# Patient Record
Sex: FEMALE | Race: WHITE | NOT HISPANIC OR LATINO | Employment: PART TIME | URBAN - METROPOLITAN AREA
[De-identification: names, ages, dates, MRNs, and addresses within clinical notes are randomized per-mention and may not be internally consistent; named-entity substitution may affect disease eponyms.]

---

## 2017-03-08 DIAGNOSIS — F51.01 PRIMARY INSOMNIA: ICD-10-CM

## 2017-03-08 NOTE — TELEPHONE ENCOUNTER
amitriptyline (ELAVIL) 10 MG tablet - Fax from pharmacy states: PATIENT NEEDS 90 DAY FOR INSURANCE  Last Written Prescription Date: 11/11/16  Last Quantity: 100, # refills: 3  Last Office Visit with G, P or [facility] prescribing provider: 11/11/16       Creatinine   Date Value Ref Range Status   01/08/2015 0.7 mg/dL Final     Lab Results   Component Value Date    AST 20 01/08/2015     Lab Results   Component Value Date    ALT 17 01/08/2015     BP Readings from Last 3 Encounters:   11/11/16 106/74   01/14/16 118/67   08/11/15 102/70

## 2017-03-09 RX ORDER — AMITRIPTYLINE HYDROCHLORIDE 10 MG/1
10-50 TABLET ORAL
Qty: 270 TABLET | Refills: 3 | Status: SHIPPED | OUTPATIENT
Start: 2017-03-09 | End: 2017-08-18

## 2017-03-09 NOTE — TELEPHONE ENCOUNTER
Routing refill request to provider for review/approval because:  Please clarify a quantity for 90 days. (1-5 per day)  Bailey Martniez, RN  Triage Nurse

## 2017-04-25 ENCOUNTER — OFFICE VISIT (OUTPATIENT)
Dept: PEDIATRICS | Facility: CLINIC | Age: 47
End: 2017-04-25
Payer: COMMERCIAL

## 2017-04-25 VITALS
TEMPERATURE: 97 F | OXYGEN SATURATION: 99 % | HEART RATE: 85 BPM | HEIGHT: 66 IN | WEIGHT: 148 LBS | SYSTOLIC BLOOD PRESSURE: 102 MMHG | DIASTOLIC BLOOD PRESSURE: 60 MMHG | BODY MASS INDEX: 23.78 KG/M2

## 2017-04-25 DIAGNOSIS — Z00.00 ROUTINE GENERAL MEDICAL EXAMINATION AT A HEALTH CARE FACILITY: Primary | ICD-10-CM

## 2017-04-25 DIAGNOSIS — R53.83 FATIGUE, UNSPECIFIED TYPE: ICD-10-CM

## 2017-04-25 DIAGNOSIS — Z13.6 CARDIOVASCULAR SCREENING; LDL GOAL LESS THAN 160: ICD-10-CM

## 2017-04-25 DIAGNOSIS — Z13.1 SCREENING FOR DIABETES MELLITUS: ICD-10-CM

## 2017-04-25 DIAGNOSIS — R42 DIZZINESS: ICD-10-CM

## 2017-04-25 LAB
ERYTHROCYTE [DISTWIDTH] IN BLOOD BY AUTOMATED COUNT: 12 % (ref 10–15)
HCT VFR BLD AUTO: 37.5 % (ref 35–47)
HGB BLD-MCNC: 12.3 G/DL (ref 11.7–15.7)
MCH RBC QN AUTO: 29.9 PG (ref 26.5–33)
MCHC RBC AUTO-ENTMCNC: 32.8 G/DL (ref 31.5–36.5)
MCV RBC AUTO: 91 FL (ref 78–100)
PLATELET # BLD AUTO: 261 10E9/L (ref 150–450)
RBC # BLD AUTO: 4.11 10E12/L (ref 3.8–5.2)
WBC # BLD AUTO: 6.2 10E9/L (ref 4–11)

## 2017-04-25 PROCEDURE — 36415 COLL VENOUS BLD VENIPUNCTURE: CPT | Performed by: INTERNAL MEDICINE

## 2017-04-25 PROCEDURE — 82947 ASSAY GLUCOSE BLOOD QUANT: CPT | Performed by: INTERNAL MEDICINE

## 2017-04-25 PROCEDURE — 99396 PREV VISIT EST AGE 40-64: CPT | Performed by: INTERNAL MEDICINE

## 2017-04-25 PROCEDURE — 80061 LIPID PANEL: CPT | Performed by: INTERNAL MEDICINE

## 2017-04-25 PROCEDURE — 85027 COMPLETE CBC AUTOMATED: CPT | Performed by: INTERNAL MEDICINE

## 2017-04-25 PROCEDURE — 84443 ASSAY THYROID STIM HORMONE: CPT | Performed by: INTERNAL MEDICINE
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
## 2017-04-25 NOTE — NURSING NOTE
"Chief Complaint   Patient presents with     Physical       Initial /60 (BP Location: Right arm, Patient Position: Chair, Cuff Size: Adult Regular)  Pulse 85  Temp 97  F (36.1  C) (Tympanic)  Ht 5' 5.5\" (1.664 m)  Wt 148 lb (67.1 kg)  SpO2 99%  BMI 24.25 kg/m2 Estimated body mass index is 24.25 kg/(m^2) as calculated from the following:    Height as of this encounter: 5' 5.5\" (1.664 m).    Weight as of this encounter: 148 lb (67.1 kg).  Medication Reconciliation: complete  "

## 2017-04-25 NOTE — MR AVS SNAPSHOT
After Visit Summary   4/25/2017    Breonna Pinon    MRN: [id number]           Patient Information     Date Of Birth          1970        Visit Information        Provider Department      4/25/2017 4:50 PM Kaley Carolina MD [facility]        Care Instructions      Preventive Health Recommendations  Female Ages 40 to 49    Yearly exam:     See your health care provider every year in order to  1. Review health changes.   2. Discuss preventive care.    3. Review your medicines if your doctor prescribed any.        If you get Pap tests with HPV test, you only need to test every 5 years, unless you have an abnormal result. You are due in 2019      Ask your doctor if you should have a mammogram.      Have a colonoscopy (test for colon cancer) if someone in your family has had colon cancer or polyps before age 50.       Have a cholesterol test every 5 years.       Have a diabetes test (fasting glucose) after age 45. If you are at risk for diabetes, you should have this test every 3 years.     Shots: Get a flu shot each year. Get a tetanus shot every 10 years.  You are due in 2020.    Nutrition:     Eat at least 5 servings of fruits and vegetables each day.    Eat whole-grain bread, whole-wheat pasta and brown rice instead of white grains and rice.    Talk to your provider about Calcium and Vitamin D.     Lifestyle    Exercise at least 150 minutes a week (an average of 30 minutes a day, 5 days a week). This will help you control your weight and prevent disease.    Limit alcohol to one drink per day.    No smoking.     Wear sunscreen to prevent skin cancer.    See your dentist every six months for an exam and cleaning.        Follow-ups after your visit        Who to contact     If you have questions or need follow up information about today's clinic visit or your schedule please contact [facility] directly at [phone].  Normal or non-critical lab and imaging results will be  "communicated to you by [employer], letter or phone within 4 business days after the clinic has received the results. If you do not hear from us within 7 days, please contact the clinic through [employer] or phone. If you have a critical or abnormal lab result, we will notify you by phone as soon as possible.  Submit refill requests through [employer] or call your pharmacy and they will forward the refill request to us. Please allow 3 business days for your refill to be completed.          Additional Information About Your Visit        [employer] Information     [employer] gives you secure access to your electronic health record. If you see a primary care provider, you can also send messages to your care team and make appointments. If you have questions, please call your primary care clinic.  If you do not have a primary care provider, please call [phone] and they will assist you.        Care EveryWhere ID     This is your Care EveryWhere ID. This could be used by other organizations to access your [facility] medical records  [id number]        Your Vitals Were     Pulse Temperature Height Pulse Oximetry BMI (Body Mass Index)       85 97  F (36.1  C) (Tympanic) 5' 5.5\" (1.664 m) 99% 24.25 kg/m2        Blood Pressure from Last 3 Encounters:   04/25/17 102/60   11/11/16 106/74   01/14/16 118/67    Weight from Last 3 Encounters:   04/25/17 148 lb (67.1 kg)   11/11/16 146 lb 4.8 oz (66.4 kg)   01/14/16 144 lb (65.3 kg)              Today, you had the following     No orders found for display       Primary Care Provider Office Phone # Fax #    Kaley Carolina [phone]       [facility] [address] [place] [address]        Thank you!     Thank you for choosing [facility]  for your care. Our goal is always to provide you with excellent care. Hearing back from our patients is one way we can continue to improve our services. Please take a few minutes to complete the written survey " that you may receive in the mail after your visit with us. Thank you!             Your Updated Medication List - Protect others around you: Learn how to safely use, store and throw away your medicines at www.disposemymeds.org.          This list is accurate as of: 4/25/17  6:02 PM.  Always use your most recent med list.                   Brand Name Dispense Instructions for use    amitriptyline 10 MG tablet    ELAVIL    270 tablet    Take 1-5 tablets (10-50 mg) by mouth nightly as needed for sleep       cetirizine 10 MG tablet    zyrTEC    30 tablet    Take 1 tablet (10 mg) by mouth every evening       fish oil-omega-3 fatty acids 1000 MG capsule     180 capsule    Take 2 capsules by mouth daily.       lactobacillus acid-pectin Caps     100 capsule    Take 1 capsule by mouth daily.       norgestimate-ethinyl estradiol 0.25-35 MG-MCG per tablet    ORTHO-CYCLEN, SPRINTEC    112 tablet    Take 1 tablet by mouth daily Take active pills daily

## 2017-04-25 NOTE — PATIENT INSTRUCTIONS
Preventive Health Recommendations  Female Ages 40 to 49    Yearly exam:     See your health care provider every year in order to  1. Review health changes.   2. Discuss preventive care.    3. Review your medicines if your doctor prescribed any.        If you get Pap tests with HPV test, you only need to test every 5 years, unless you have an abnormal result. You are due in 2019      Ask your doctor if you should have a mammogram.      Have a colonoscopy (test for colon cancer) if someone in your family has had colon cancer or polyps before age 50.       Have a cholesterol test every 5 years.       Have a diabetes test (fasting glucose) after age 45. If you are at risk for diabetes, you should have this test every 3 years.     Shots: Get a flu shot each year. Get a tetanus shot every 10 years.  You are due in 2020.    Nutrition:     Eat at least 5 servings of fruits and vegetables each day.    Eat whole-grain bread, whole-wheat pasta and brown rice instead of white grains and rice.    Talk to your provider about Calcium and Vitamin D.     Lifestyle    Exercise at least 150 minutes a week (an average of 30 minutes a day, 5 days a week). This will help you control your weight and prevent disease.    Limit alcohol to one drink per day.    No smoking.     Wear sunscreen to prevent skin cancer.    See your dentist every six months for an exam and cleaning.

## 2017-04-26 LAB
CHOLEST SERPL-MCNC: 142 MG/DL
GLUCOSE SERPL-MCNC: 82 MG/DL (ref 70–99)
HDLC SERPL-MCNC: 60 MG/DL
LDLC SERPL CALC-MCNC: 55 MG/DL
NONHDLC SERPL-MCNC: 82 MG/DL
TRIGL SERPL-MCNC: 137 MG/DL
TSH SERPL DL<=0.005 MIU/L-ACNC: 2.24 MU/L (ref 0.4–4)

## 2017-08-17 ENCOUNTER — MYC MEDICAL ADVICE (OUTPATIENT)
Dept: PEDIATRICS | Facility: CLINIC | Age: 47
End: 2017-08-17

## 2017-08-17 DIAGNOSIS — F51.01 PRIMARY INSOMNIA: ICD-10-CM

## 2017-08-18 RX ORDER — AMITRIPTYLINE HYDROCHLORIDE 10 MG/1
10-50 TABLET ORAL
Qty: 450 TABLET | Refills: 1 | Status: SHIPPED | OUTPATIENT
Start: 2017-08-18 | End: 2018-02-24

## 2017-08-18 NOTE — TELEPHONE ENCOUNTER
Patient reporting insurance requires a 90 day supply of meds. Her Amitriptyline needs an adjustment in her dispense amount. Confirmed her dosing, adjusted dispense amount for 5 tabs nightly and resent rx per standing order.

## 2017-10-13 ENCOUNTER — MYC MEDICAL ADVICE (OUTPATIENT)
Dept: PEDIATRICS | Facility: CLINIC | Age: 47
End: 2017-10-13

## 2017-11-11 DIAGNOSIS — Z30.41 ENCOUNTER FOR SURVEILLANCE OF CONTRACEPTIVE PILLS: ICD-10-CM

## 2017-11-14 RX ORDER — NORGESTIMATE AND ETHINYL ESTRADIOL 0.25-0.035
KIT ORAL
Qty: 112 TABLET | Refills: 1 | Status: SHIPPED | OUTPATIENT
Start: 2017-11-14 | End: 2018-03-23

## 2017-11-14 NOTE — TELEPHONE ENCOUNTER
Prescription approved per [facility] Refill Protocol.    Susanna GUERRA RN, BSN, PHN  Wayne Flex RN

## 2018-01-22 ENCOUNTER — TELEPHONE (OUTPATIENT)
Dept: PEDIATRICS | Facility: CLINIC | Age: 48
End: 2018-01-22

## 2018-01-22 NOTE — TELEPHONE ENCOUNTER
"Patient Communication Preferences indicate  Do not contact  and/or communication by \"Phone\" is not preferred. Call not required per Outreach team.        Outreach ,  Jaylen Bautista     "

## 2018-01-27 ENCOUNTER — OFFICE VISIT (OUTPATIENT)
Dept: URGENT CARE | Facility: URGENT CARE | Age: 48
End: 2018-01-27
Payer: COMMERCIAL

## 2018-01-27 VITALS
SYSTOLIC BLOOD PRESSURE: 102 MMHG | RESPIRATION RATE: 12 BRPM | OXYGEN SATURATION: 98 % | BODY MASS INDEX: 25.4 KG/M2 | TEMPERATURE: 97.9 F | WEIGHT: 155 LBS | HEART RATE: 86 BPM | DIASTOLIC BLOOD PRESSURE: 72 MMHG

## 2018-01-27 DIAGNOSIS — M62.838 NECK MUSCLE SPASM: Primary | ICD-10-CM

## 2018-01-27 PROCEDURE — 99213 OFFICE O/P EST LOW 20 MIN: CPT | Performed by: PHYSICIAN ASSISTANT

## 2018-01-27 RX ORDER — IBUPROFEN 800 MG/1
800 TABLET, FILM COATED ORAL EVERY 8 HOURS PRN
Qty: 30 TABLET | Refills: 1 | Status: SHIPPED | OUTPATIENT
Start: 2018-01-27 | End: 2018-03-23

## 2018-01-27 RX ORDER — CYCLOBENZAPRINE HCL 10 MG
10 TABLET ORAL 3 TIMES DAILY PRN
Qty: 30 TABLET | Refills: 0 | Status: SHIPPED | OUTPATIENT
Start: 2018-01-27 | End: 2018-02-19
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
## 2018-01-30 ENCOUNTER — OFFICE VISIT (OUTPATIENT)
Dept: PEDIATRICS | Facility: CLINIC | Age: 48
End: 2018-01-30
Payer: COMMERCIAL

## 2018-01-30 VITALS
HEART RATE: 83 BPM | DIASTOLIC BLOOD PRESSURE: 62 MMHG | OXYGEN SATURATION: 97 % | BODY MASS INDEX: 24.46 KG/M2 | SYSTOLIC BLOOD PRESSURE: 100 MMHG | TEMPERATURE: 98.3 F | HEIGHT: 66 IN | WEIGHT: 152.2 LBS

## 2018-01-30 DIAGNOSIS — S16.1XXD STRAIN OF NECK MUSCLE, SUBSEQUENT ENCOUNTER: Primary | ICD-10-CM

## 2018-01-30 PROCEDURE — 99213 OFFICE O/P EST LOW 20 MIN: CPT | Performed by: INTERNAL MEDICINE

## 2018-01-30 NOTE — PATIENT INSTRUCTIONS
Understanding Cervical Strain    There are 7 bones (vertebrae) in the neck that are part of the spine. These are called the cervical spine. Cervical strain is a medical term for neck pain. The neck has several layers of muscles. These are connected with tendons to the cervical spine and other bones. Neck pain is often the result of injury to these muscles and tendons.  Causes of cervical strain  Different types of stress on the neck can damage muscles and tendons (soft tissues) and cause cervical strain. Cervical tissues can be damaged by:    The neck being forced past its normal range of motion, such as in a car accident or sports injury    Constant, low-level stress, such as from poor posture or a poorly set-up workspace  Symptoms of cervical strain  These may include:    Neck pain or stiffness    Pain in the shoulders or upper back    Muscle spasms    Headache, often starting at the base of the neck    Irritability, difficulty concentrating, or sleeplessness  Treatment for cervical strain  This problem often gets better on its own. Treatments aim to reduce pain and inflammation and increase the range of motion of the neck. Possible treatments include:    Over-the-counter or prescription pain medicine. These help relieve pain and inflammation.    Stretching exercises to decrease neck stiffness.    Massage to decrease neck stiffness.    Cold or heat pack. These help reduce pain and swelling.  Call 911  Call emergency services right away if you have any of these:    Face drooping or numbness    Numbness or weakness, especially in the arms or on one side    Slurred speech or difficulty speaking    Blurred vision   When to call your healthcare provider  Call your healthcare provider right away if you have any of these:    Fever of 100.4 F (38 C) or higher, or as directed    Pain or stiffness that gets worse    Symptoms that don t get better, or get worse    Numbness, tingling, weakness or shooting pains into the  arms or legs    New symptoms  Date Last Reviewed: 3/10/2016    2751-8184 [employer]. [address] [place] [address]. All rights reserved. This information is not intended as a substitute for professional medical care. Always follow your healthcare professional's instructions.          Head Tilt / Upper Trapezius Stretch (Flexibility)    1. Sit up straight in a chair with your head and neck in a neutral position, ears in line with shoulders. Hold the edge of your chair seat with your right hand. Tuck your chin in slightly.  2. Tilt your head to the left, while looking straight ahead.  3. Put your left hand on the right side of your head. Gently pull your head to the left. Hold for 30 to 60 seconds. Use gentle pressure to increase the stretch. Don t force your head into position.  4. Return your head and neck to the neutral position.  5. Repeat this exercise 2 times, or as instructed.  6. Switch sides and repeat 2 times, or as instructed.  Challenge yourself  Tuck one end of a towel under your left arm. Then bring the other end over your right shoulder. Pull the towel down on your right shoulder with both hands as you side-bend your head to the left. Repeat with the other side.   Date Last Reviewed: 3/10/2016    0539-8977 [employer]. [address] [place] [address]. All rights reserved. This information is not intended as a substitute for professional medical care. Always follow your healthcare professional's instructions.

## 2018-01-30 NOTE — MR AVS SNAPSHOT
After Visit Summary   1/30/2018    Breonna Pinon    MRN: [id number]           Patient Information     Date Of Birth          1970        Visit Information        Provider Department      1/30/2018 9:30 AM Helio Rodarte Mai, MD [facility]        Today's Diagnoses     Strain of neck muscle, subsequent encounter    -  1      Care Instructions      Understanding Cervical Strain    There are 7 bones (vertebrae) in the neck that are part of the spine. These are called the cervical spine. Cervical strain is a medical term for neck pain. The neck has several layers of muscles. These are connected with tendons to the cervical spine and other bones. Neck pain is often the result of injury to these muscles and tendons.  Causes of cervical strain  Different types of stress on the neck can damage muscles and tendons (soft tissues) and cause cervical strain. Cervical tissues can be damaged by:    The neck being forced past its normal range of motion, such as in a car accident or sports injury    Constant, low-level stress, such as from poor posture or a poorly set-up workspace  Symptoms of cervical strain  These may include:    Neck pain or stiffness    Pain in the shoulders or upper back    Muscle spasms    Headache, often starting at the base of the neck    Irritability, difficulty concentrating, or sleeplessness  Treatment for cervical strain  This problem often gets better on its own. Treatments aim to reduce pain and inflammation and increase the range of motion of the neck. Possible treatments include:    Over-the-counter or prescription pain medicine. These help relieve pain and inflammation.    Stretching exercises to decrease neck stiffness.    Massage to decrease neck stiffness.    Cold or heat pack. These help reduce pain and swelling.  Call 911  Call emergency services right away if you have any of these:    Face drooping or numbness    Numbness or weakness, especially in the arms or  on one side    Slurred speech or difficulty speaking    Blurred vision   When to call your healthcare provider  Call your healthcare provider right away if you have any of these:    Fever of 100.4 F (38 C) or higher, or as directed    Pain or stiffness that gets worse    Symptoms that don t get better, or get worse    Numbness, tingling, weakness or shooting pains into the arms or legs    New symptoms  Date Last Reviewed: 3/10/2016    1257-9052 [employer]. [address] [place] [address]. All rights reserved. This information is not intended as a substitute for professional medical care. Always follow your healthcare professional's instructions.          Head Tilt / Upper Trapezius Stretch (Flexibility)    1. Sit up straight in a chair with your head and neck in a neutral position, ears in line with shoulders. Hold the edge of your chair seat with your right hand. Tuck your chin in slightly.  2. Tilt your head to the left, while looking straight ahead.  3. Put your left hand on the right side of your head. Gently pull your head to the left. Hold for 30 to 60 seconds. Use gentle pressure to increase the stretch. Don t force your head into position.  4. Return your head and neck to the neutral position.  5. Repeat this exercise 2 times, or as instructed.  6. Switch sides and repeat 2 times, or as instructed.  Challenge yourself  Tuck one end of a towel under your left arm. Then bring the other end over your right shoulder. Pull the towel down on your right shoulder with both hands as you side-bend your head to the left. Repeat with the other side.   Date Last Reviewed: 3/10/2016    5700-3730 [employer]. [address] [place] [address]. All rights reserved. This information is not intended as a substitute for professional medical care. Always follow your healthcare professional's instructions.                Follow-ups after your visit        Additional Services      MASSAGE THERAPY REFERRAL       Please be aware that coverage of these services is subject to the terms and limitations of your health insurance plan.  Call member services at your health plan with any benefit or coverage questions.      Please bring the following to your appointment:    >>   Any x-rays, CTs or MRIs which have been performed.  Contact the facility where they were done to arrange for  prior to your scheduled appointment.    >>   List of current medications   >>   This referral request   >>   Any documents/labs given to you for this referral                  Your next 10 appointments already scheduled     Mar 06, 2018  3:30 PM CST   [facility]ba [facility]    [address] [place] [address]  [address]  [facility] [address] [phone]              Who to contact     If you have questions or need follow up information about today's clinic visit or your schedule please contact [facility] directly at [phone].  Normal or non-critical lab and imaging results will be communicated to you by MyChart, letter or phone within 4 business days after the clinic has received the results. If you do not hear from us within 7 days, please contact the clinic through [employer] or phone. If you have a critical or abnormal lab result, we will notify you by phone as soon as possible.  Submit refill requests through [employer] or call your pharmacy and they will forward the refill request to us. Please allow 3 business days for your refill to be completed.          Additional Information About Your Visit        [employer] Information     [employer] gives you secure access to your electronic health record. If you see a primary care provider, you can also send messages to your care team and make appointments. If you have questions, please call your primary care clinic.  If you do not have a primary care provider, please call [phone] and they  "will assist you.        Care EveryWhere ID     This is your Care EveryWhere ID. This could be used by other organizations to access your [facility] medical records  [id number]        Your Vitals Were     Pulse Temperature Height Pulse Oximetry BMI (Body Mass Index)       83 98.3  F (36.8  C) (Oral) 5' 5.5\" (1.664 m) 97% 24.94 kg/m2        Blood Pressure from Last 3 Encounters:   01/30/18 100/62   01/27/18 102/72   04/25/17 102/60    Weight from Last 3 Encounters:   01/30/18 152 lb 3.2 oz (69 kg)   01/27/18 155 lb (70.3 kg)   04/25/17 148 lb (67.1 kg)              We Performed the Following     MASSAGE THERAPY REFERRAL        Primary Care Provider Office Phone # Fax #    Kaley Carolina [phone] [address] [place] [address]        Equal Access to Services     [facility] AH: Hadii chinedu billings hadasho Soomaali, waaxda luqadaha, qaybta kaalmada adeegyada, christie ortega . So [facility] [phone].    ATENCIÓN: Si manasa clark, tiene a frazier disposición servicios gratuitos de asistencia lingüística. Llame al [phone].    We comply with applicable federal civil rights laws and [place] laws. We do not discriminate on the basis of race, color, national origin, age, disability, sex, sexual orientation, or gender identity.            Thank you!     [facility]  [facility] your care. Our goal is always to provide you with excellent care. Hearing back from our patients is one way we can continue to improve our services. Please take a few minutes to complete the written survey that you may receive in the mail after your visit with us. Thank you!             Your Updated Medication List - Protect others around you: Learn how to safely use, store and throw away your medicines at www.disposemymeds.org.          This list is accurate as of 1/30/18 10:37 AM.  Always use your most recent med list.                   Brand Name Dispense Instructions " for use Diagnosis    amitriptyline 10 MG tablet    ELAVIL    450 tablet    Take 1-5 tablets (10-50 mg) by mouth nightly as needed for sleep    Primary insomnia       cetirizine 10 MG tablet    zyrTEC    30 tablet    Take 1 tablet (10 mg) by mouth every evening        cyclobenzaprine 10 MG tablet    FLEXERIL    30 tablet    Take 1 tablet (10 mg) by mouth 3 times daily as needed for muscle spasms    Neck muscle spasm       ibuprofen 800 MG tablet    ADVIL/MOTRIN    30 tablet    Take 1 tablet (800 mg) by mouth every 8 hours as needed for moderate pain    Neck muscle spasm       lactobacillus acid-pectin Caps     100 capsule    Take 1 capsule by mouth daily.        SPRINTEC 28 0.25-35 MG-MCG per tablet   Generic drug:  norgestimate-ethinyl estradiol     112 tablet    TAKE 1 TABLET BY MOUTH DAILY. TAKE ACTIVE TABLETS DAILY    Encounter for surveillance of contraceptive pills

## 2018-01-30 NOTE — NURSING NOTE
"No chief complaint on file.      Initial /62 (BP Location: Right arm, Patient Position: Chair, Cuff Size: Adult Regular)  Pulse 83  Temp 98.3  F (36.8  C) (Oral)  Ht 5' 5.5\" (1.664 m)  Wt 152 lb 3.2 oz (69 kg)  SpO2 97%  BMI 24.94 kg/m2 Estimated body mass index is 24.94 kg/(m^2) as calculated from the following:    Height as of this encounter: 5' 5.5\" (1.664 m).    Weight as of this encounter: 152 lb 3.2 oz (69 kg).  Medication Reconciliation: complete   Risa Mayfield MA 9:50 AM 1/30/2018     "
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
## 2018-01-31 ENCOUNTER — MYC MEDICAL ADVICE (OUTPATIENT)
Dept: PEDIATRICS | Facility: CLINIC | Age: 48
End: 2018-01-31
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
## 2018-02-07 ENCOUNTER — THERAPY VISIT (OUTPATIENT)
Dept: PHYSICAL THERAPY | Facility: CLINIC | Age: 48
End: 2018-02-07
Payer: COMMERCIAL

## 2018-02-07 DIAGNOSIS — M54.2 CERVICAL PAIN: Primary | ICD-10-CM

## 2018-02-07 DIAGNOSIS — M25.512 ACUTE PAIN OF LEFT SHOULDER: ICD-10-CM

## 2018-02-07 DIAGNOSIS — M79.672 LEFT FOOT PAIN: ICD-10-CM

## 2018-02-07 PROCEDURE — 97161 PT EVAL LOW COMPLEX 20 MIN: CPT | Mod: GP | Performed by: PHYSICAL THERAPIST

## 2018-02-07 PROCEDURE — 97110 THERAPEUTIC EXERCISES: CPT | Mod: GP | Performed by: PHYSICAL THERAPIST
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
## 2018-02-14 ENCOUNTER — THERAPY VISIT (OUTPATIENT)
Dept: PHYSICAL THERAPY | Facility: CLINIC | Age: 48
End: 2018-02-14
Payer: COMMERCIAL

## 2018-02-14 DIAGNOSIS — M54.2 CERVICAL PAIN: ICD-10-CM

## 2018-02-14 DIAGNOSIS — M79.672 LEFT FOOT PAIN: ICD-10-CM

## 2018-02-14 DIAGNOSIS — M25.512 ACUTE PAIN OF LEFT SHOULDER: ICD-10-CM

## 2018-02-14 PROCEDURE — 97110 THERAPEUTIC EXERCISES: CPT | Mod: GP | Performed by: PHYSICAL THERAPIST

## 2018-02-14 PROCEDURE — 97112 NEUROMUSCULAR REEDUCATION: CPT | Mod: GP | Performed by: PHYSICAL THERAPIST

## 2018-02-21 ENCOUNTER — THERAPY VISIT (OUTPATIENT)
Dept: PHYSICAL THERAPY | Facility: CLINIC | Age: 48
End: 2018-02-21
Payer: COMMERCIAL

## 2018-02-21 DIAGNOSIS — M79.672 LEFT FOOT PAIN: ICD-10-CM

## 2018-02-21 DIAGNOSIS — M54.2 CERVICAL PAIN: ICD-10-CM

## 2018-02-21 DIAGNOSIS — M25.512 ACUTE PAIN OF LEFT SHOULDER: ICD-10-CM

## 2018-02-21 PROCEDURE — 97110 THERAPEUTIC EXERCISES: CPT | Mod: GP | Performed by: PHYSICAL THERAPIST

## 2018-02-21 PROCEDURE — 97112 NEUROMUSCULAR REEDUCATION: CPT | Mod: GP | Performed by: PHYSICAL THERAPIST

## 2018-02-24 DIAGNOSIS — F51.01 PRIMARY INSOMNIA: ICD-10-CM

## 2018-02-24 NOTE — TELEPHONE ENCOUNTER
"Requested Prescriptions   Pending Prescriptions Disp Refills     amitriptyline (ELAVIL) 10 MG tablet [Pharmacy Med Name: AMITRIPTYLINE HCL 10 MG TAB]  Last Written Prescription Date:  08/18/2017  Last Fill Quantity: 450 tablet,  # refills: 1   Last office visit: 1/30/2018 with prescribing provider:  Helio Rodarte Mai, MD    Future Office Visit:   Next 5 appointments (look out 90 days)     Mar 23, 2018  8:30 AM CDT   [facility]    [address] [place] [address]  [address]  [facility] [id number] [phone]                  450 tablet 1     Sig: TAKE 1-5 TABLETS (10-50 MG) BY MOUTH NIGHTLY AS NEEDED FOR SLEEP    Tricyclic Antidepressants Protocol Failed    2/24/2018 10:38 AM       Failed - No positive pregnancy test in past 12 months       Passed - Blood pressure under 140/90 in past 12 months    BP Readings from Last 3 Encounters:   01/30/18 100/62   01/27/18 102/72   04/25/17 102/60                Passed - Recent (12 mo) or future (30 d) visit with authorizing provider's specialty     Patient had office visit in the last year or has a visit in the next 30 days with authorizing provider.  See \"Patient Info\" tab in inbasket, or \"Choose Columns\" in Meds & Orders section of the refill encounter.            Passed - Patient is age 18 or older       Passed - No active pregnancy on record          "

## 2018-02-26 ENCOUNTER — THERAPY VISIT (OUTPATIENT)
Dept: CHIROPRACTIC MEDICINE | Facility: CLINIC | Age: 48
End: 2018-02-26
Payer: COMMERCIAL

## 2018-02-26 DIAGNOSIS — S16.1XXD STRAIN OF NECK MUSCLE, SUBSEQUENT ENCOUNTER: Primary | ICD-10-CM

## 2018-02-26 DIAGNOSIS — M54.6 ACUTE BILATERAL THORACIC BACK PAIN: ICD-10-CM

## 2018-02-26 DIAGNOSIS — M79.672 LEFT FOOT PAIN: ICD-10-CM

## 2018-02-26 DIAGNOSIS — M99.02 THORACIC SEGMENT DYSFUNCTION: ICD-10-CM
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
## 2018-02-26 DIAGNOSIS — M99.06 SOMATIC DYSFUNCTION OF LOWER EXTREMITIES: ICD-10-CM

## 2018-02-26 DIAGNOSIS — M25.512 ACUTE PAIN OF LEFT SHOULDER: ICD-10-CM

## 2018-02-26 PROCEDURE — 98940 CHIROPRACT MANJ 1-2 REGIONS: CPT | Mod: AT | Performed by: CHIROPRACTOR

## 2018-02-26 PROCEDURE — 97810 ACUP 1/> WO ESTIM 1ST 15 MIN: CPT | Mod: GA | Performed by: CHIROPRACTOR

## 2018-02-26 PROCEDURE — 99203 OFFICE O/P NEW LOW 30 MIN: CPT | Mod: 25 | Performed by: CHIROPRACTOR

## 2018-02-27 RX ORDER — AMITRIPTYLINE HYDROCHLORIDE 10 MG/1
TABLET ORAL
Qty: 450 TABLET | Refills: 1 | Status: SHIPPED | OUTPATIENT
Start: 2018-02-27 | End: 2018-03-23 | Stop reason: SINTOL
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
## 2018-02-28 ENCOUNTER — THERAPY VISIT (OUTPATIENT)
Dept: PHYSICAL THERAPY | Facility: CLINIC | Age: 48
End: 2018-02-28
Payer: COMMERCIAL

## 2018-02-28 DIAGNOSIS — M54.2 CERVICAL PAIN: ICD-10-CM

## 2018-02-28 DIAGNOSIS — M25.512 ACUTE PAIN OF LEFT SHOULDER: ICD-10-CM

## 2018-02-28 DIAGNOSIS — M79.672 LEFT FOOT PAIN: ICD-10-CM

## 2018-02-28 PROCEDURE — 97110 THERAPEUTIC EXERCISES: CPT | Mod: GP | Performed by: PHYSICAL THERAPIST
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
## 2018-03-01 PROBLEM — M99.06 SOMATIC DYSFUNCTION OF LOWER EXTREMITIES: Status: ACTIVE | Noted: 2018-03-01

## 2018-03-01 PROBLEM — S16.1XXD STRAIN OF NECK MUSCLE, SUBSEQUENT ENCOUNTER: Status: ACTIVE | Noted: 2018-03-01

## 2018-03-01 PROBLEM — M99.02 THORACIC SEGMENT DYSFUNCTION: Status: ACTIVE | Noted: 2018-03-01

## 2018-03-01 PROBLEM — M54.6 ACUTE BILATERAL THORACIC BACK PAIN: Status: ACTIVE | Noted: 2018-03-01

## 2018-03-05 ENCOUNTER — TELEPHONE (OUTPATIENT)
Dept: PEDIATRICS | Facility: CLINIC | Age: 48
End: 2018-03-05
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
## 2018-03-06 ENCOUNTER — THERAPY VISIT (OUTPATIENT)
Dept: CHIROPRACTIC MEDICINE | Facility: CLINIC | Age: 48
End: 2018-03-06
Payer: COMMERCIAL

## 2018-03-06 ENCOUNTER — OFFICE VISIT (OUTPATIENT)
Dept: DERMATOLOGY | Facility: CLINIC | Age: 48
End: 2018-03-06
Payer: COMMERCIAL

## 2018-03-06 DIAGNOSIS — M25.512 ACUTE PAIN OF LEFT SHOULDER: ICD-10-CM

## 2018-03-06 DIAGNOSIS — L90.0 LICHEN SCLEROSUS ET ATROPHICUS: Primary | ICD-10-CM

## 2018-03-06 DIAGNOSIS — M79.672 LEFT FOOT PAIN: ICD-10-CM

## 2018-03-06 DIAGNOSIS — L71.0 PERIORAL DERMATITIS: ICD-10-CM

## 2018-03-06 DIAGNOSIS — M99.06 SOMATIC DYSFUNCTION OF LOWER EXTREMITIES: ICD-10-CM
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
## 2018-03-06 DIAGNOSIS — Z51.81 MEDICATION MONITORING ENCOUNTER: ICD-10-CM

## 2018-03-06 DIAGNOSIS — S16.1XXD STRAIN OF NECK MUSCLE, SUBSEQUENT ENCOUNTER: Primary | ICD-10-CM

## 2018-03-06 DIAGNOSIS — M54.6 ACUTE BILATERAL THORACIC BACK PAIN: ICD-10-CM

## 2018-03-06 DIAGNOSIS — B35.3 TINEA PEDIS OF BOTH FEET: ICD-10-CM

## 2018-03-06 DIAGNOSIS — M99.02 THORACIC SEGMENT DYSFUNCTION: ICD-10-CM

## 2018-03-06 PROCEDURE — 98940 CHIROPRACT MANJ 1-2 REGIONS: CPT | Mod: AT | Performed by: CHIROPRACTOR

## 2018-03-06 PROCEDURE — 98943 CHIROPRACT MANJ XTRSPINL 1/>: CPT | Mod: 51 | Performed by: CHIROPRACTOR

## 2018-03-06 PROCEDURE — 97810 ACUP 1/> WO ESTIM 1ST 15 MIN: CPT | Mod: GA | Performed by: CHIROPRACTOR

## 2018-03-06 PROCEDURE — 99203 OFFICE O/P NEW LOW 30 MIN: CPT | Performed by: DERMATOLOGY

## 2018-03-06 RX ORDER — ECONAZOLE NITRATE 10 MG/G
CREAM TOPICAL
Qty: 30 G | Refills: 3 | Status: SHIPPED | OUTPATIENT
Start: 2018-03-06 | End: 2019-04-05

## 2018-03-06 RX ORDER — DOXYCYCLINE HYCLATE 100 MG
100 TABLET ORAL 2 TIMES DAILY
Qty: 28 TABLET | Refills: 1 | Status: SHIPPED | OUTPATIENT
Start: 2018-03-06 | End: 2019-04-05

## 2018-03-06 RX ORDER — CLOBETASOL PROPIONATE 0.5 MG/G
OINTMENT TOPICAL
Qty: 30 G | Refills: 3 | Status: SHIPPED | OUTPATIENT
Start: 2018-03-06 | End: 2020-07-15

## 2018-03-06 NOTE — TELEPHONE ENCOUNTER
Call to Shauna and she was advised.  She confirms that they do require the prescription to list the nu,rodger of x per week.  Madelyn Sepulveda, RACHELLE  Message handled by Nurse Triage.
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
## 2018-03-06 NOTE — TELEPHONE ENCOUNTER
I prefer the frequency and number of sessions to be determined by the referral specialist (massage therapist) after first consultation.  However, if that is not acceptable by this insurance company, then I recommend weekly for 4-6 weeks.  Please relay message.  Thank you.    Dia Rodarte MD
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
## 2018-03-13 ENCOUNTER — THERAPY VISIT (OUTPATIENT)
Dept: CHIROPRACTIC MEDICINE | Facility: CLINIC | Age: 48
End: 2018-03-13
Payer: COMMERCIAL

## 2018-03-13 DIAGNOSIS — M25.512 ACUTE PAIN OF LEFT SHOULDER: ICD-10-CM

## 2018-03-13 DIAGNOSIS — M99.06 SOMATIC DYSFUNCTION OF LOWER EXTREMITIES: ICD-10-CM

## 2018-03-13 DIAGNOSIS — M79.672 LEFT FOOT PAIN: Primary | ICD-10-CM
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
## 2018-03-13 DIAGNOSIS — M99.02 THORACIC SEGMENT DYSFUNCTION: ICD-10-CM

## 2018-03-13 DIAGNOSIS — M54.6 ACUTE BILATERAL THORACIC BACK PAIN: ICD-10-CM

## 2018-03-13 DIAGNOSIS — S16.1XXD STRAIN OF NECK MUSCLE, SUBSEQUENT ENCOUNTER: ICD-10-CM

## 2018-03-13 PROCEDURE — 97810 ACUP 1/> WO ESTIM 1ST 15 MIN: CPT | Mod: GA | Performed by: CHIROPRACTOR

## 2018-03-13 PROCEDURE — 98943 CHIROPRACT MANJ XTRSPINL 1/>: CPT | Mod: 51 | Performed by: CHIROPRACTOR

## 2018-03-13 PROCEDURE — 98940 CHIROPRACT MANJ 1-2 REGIONS: CPT | Mod: AT | Performed by: CHIROPRACTOR
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
## 2018-03-20 ENCOUNTER — THERAPY VISIT (OUTPATIENT)
Dept: CHIROPRACTIC MEDICINE | Facility: CLINIC | Age: 48
End: 2018-03-20
Payer: COMMERCIAL

## 2018-03-20 DIAGNOSIS — M79.672 LEFT FOOT PAIN: Primary | ICD-10-CM
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
## 2018-03-20 DIAGNOSIS — M99.06 SOMATIC DYSFUNCTION OF LOWER EXTREMITIES: ICD-10-CM

## 2018-03-20 PROCEDURE — 97810 ACUP 1/> WO ESTIM 1ST 15 MIN: CPT | Mod: GA | Performed by: CHIROPRACTOR

## 2018-03-20 PROCEDURE — 98943 CHIROPRACT MANJ XTRSPINL 1/>: CPT | Mod: 51 | Performed by: CHIROPRACTOR
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
## 2018-03-23 ENCOUNTER — TELEPHONE (OUTPATIENT)
Dept: PEDIATRICS | Facility: CLINIC | Age: 48
End: 2018-03-23

## 2018-03-23 ENCOUNTER — OFFICE VISIT (OUTPATIENT)
Dept: PEDIATRICS | Facility: CLINIC | Age: 48
End: 2018-03-23
Payer: COMMERCIAL

## 2018-03-23 VITALS
WEIGHT: 151.6 LBS | HEIGHT: 66 IN | DIASTOLIC BLOOD PRESSURE: 66 MMHG | HEART RATE: 98 BPM | BODY MASS INDEX: 24.36 KG/M2 | SYSTOLIC BLOOD PRESSURE: 100 MMHG | TEMPERATURE: 95.4 F | OXYGEN SATURATION: 99 %

## 2018-03-23 DIAGNOSIS — Z13.1 SCREENING FOR DIABETES MELLITUS: ICD-10-CM

## 2018-03-23 DIAGNOSIS — M79.672 LEFT FOOT PAIN: ICD-10-CM

## 2018-03-23 DIAGNOSIS — M54.6 ACUTE BILATERAL THORACIC BACK PAIN: Primary | ICD-10-CM

## 2018-03-23 DIAGNOSIS — Z51.81 MEDICATION MONITORING ENCOUNTER: ICD-10-CM

## 2018-03-23 DIAGNOSIS — Z13.6 CARDIOVASCULAR SCREENING; LDL GOAL LESS THAN 160: ICD-10-CM

## 2018-03-23 DIAGNOSIS — M54.2 CERVICAL PAIN: ICD-10-CM

## 2018-03-23 DIAGNOSIS — Z30.41 ENCOUNTER FOR SURVEILLANCE OF CONTRACEPTIVE PILLS: ICD-10-CM

## 2018-03-23 DIAGNOSIS — Z00.00 ENCOUNTER FOR ROUTINE ADULT HEALTH EXAMINATION WITHOUT ABNORMAL FINDINGS: Primary | ICD-10-CM

## 2018-03-23 LAB
ALBUMIN SERPL-MCNC: 3.7 G/DL (ref 3.4–5)
ALP SERPL-CCNC: 60 U/L (ref 40–150)
ALT SERPL W P-5'-P-CCNC: 18 U/L (ref 0–50)
AST SERPL W P-5'-P-CCNC: 21 U/L (ref 0–45)
BILIRUB DIRECT SERPL-MCNC: 0.1 MG/DL (ref 0–0.2)
BILIRUB SERPL-MCNC: 0.4 MG/DL (ref 0.2–1.3)
CHOLEST SERPL-MCNC: 143 MG/DL
GLUCOSE SERPL-MCNC: 87 MG/DL (ref 70–99)
HDLC SERPL-MCNC: 66 MG/DL
LDLC SERPL CALC-MCNC: 58 MG/DL
NONHDLC SERPL-MCNC: 77 MG/DL
PROT SERPL-MCNC: 6.9 G/DL (ref 6.8–8.8)
TRIGL SERPL-MCNC: 96 MG/DL

## 2018-03-23 PROCEDURE — 36415 COLL VENOUS BLD VENIPUNCTURE: CPT | Performed by: DERMATOLOGY

## 2018-03-23 PROCEDURE — 82947 ASSAY GLUCOSE BLOOD QUANT: CPT | Performed by: DERMATOLOGY

## 2018-03-23 PROCEDURE — 80076 HEPATIC FUNCTION PANEL: CPT | Performed by: DERMATOLOGY

## 2018-03-23 PROCEDURE — 80061 LIPID PANEL: CPT | Performed by: DERMATOLOGY

## 2018-03-23 PROCEDURE — 99396 PREV VISIT EST AGE 40-64: CPT | Performed by: INTERNAL MEDICINE

## 2018-03-23 RX ORDER — NAPROXEN 500 MG/1
500 TABLET ORAL 2 TIMES DAILY WITH MEALS
Qty: 60 TABLET | Refills: 1 | Status: SHIPPED | OUTPATIENT
Start: 2018-03-23 | End: 2019-04-05

## 2018-03-23 RX ORDER — NORGESTIMATE AND ETHINYL ESTRADIOL 0.25-0.035
KIT ORAL
Qty: 112 TABLET | Refills: 3 | Status: SHIPPED | OUTPATIENT
Start: 2018-03-23 | End: 2019-03-18

## 2018-03-23 RX ORDER — CYCLOBENZAPRINE HCL 10 MG
10 TABLET ORAL 3 TIMES DAILY PRN
Qty: 90 TABLET | Refills: 1 | Status: SHIPPED | OUTPATIENT
Start: 2018-03-23 | End: 2019-04-05

## 2018-03-23 ASSESSMENT — ENCOUNTER SYMPTOMS
JOINT SWELLING: 0
HEMATOCHEZIA: 0
SORE THROAT: 0
CHILLS: 0
MYALGIAS: 1
EYE PAIN: 0
COUGH: 0
DIARRHEA: 0
ABDOMINAL PAIN: 0
FEVER: 0
SHORTNESS OF BREATH: 0
DIZZINESS: 0
PARESTHESIAS: 0
ARTHRALGIAS: 0
NAUSEA: 0
CONSTIPATION: 0
WEAKNESS: 0
FREQUENCY: 1
PALPITATIONS: 0
HEMATURIA: 0
HEADACHES: 0
DYSURIA: 0
HEARTBURN: 0
NERVOUS/ANXIOUS: 0

## 2018-03-23 NOTE — PATIENT INSTRUCTIONS
Preventive Health Recommendations  Female Ages 40 to 49    Yearly exam:     See your health care provider every year in order to  1. Review health changes.   2. Discuss preventive care.    3. Review your medicines if your doctor prescribed any.        If you get Pap tests with HPV test, you only need to test every 5 years, unless you have an abnormal result.         Ask your doctor if you should have a mammogram.      Have a colonoscopy (test for colon cancer) if someone in your family has had colon cancer or polyps before age 50.       Have a cholesterol test every 5 years.       Have a diabetes test (fasting glucose) after age 45. If you are at risk for diabetes, you should have this test every 3 years.    Shots: Get a flu shot each year. Get a tetanus shot every 10 years.  You are due in 2020.    Nutrition:     Eat at least 5 servings of fruits and vegetables each day.    Eat whole-grain bread, whole-wheat pasta and brown rice instead of white grains and rice.    Talk to your provider about Calcium and Vitamin D.     Lifestyle    Exercise at least 150 minutes a week (an average of 30 minutes a day, 5 days a week). This will help you control your weight and prevent disease.    Limit alcohol to one drink per day.    No smoking.     Wear sunscreen to prevent skin cancer.    See your dentist every six months for an exam and cleaning.

## 2018-03-23 NOTE — MR AVS SNAPSHOT
After Visit Summary   3/23/2018    Breonna Pinon    MRN: [id number]           Patient Information     Date Of Birth          1970        Visit Information        Provider Department      3/23/2018 8:30 AM Kaley Carolina MD [facility]        Today's Diagnoses     Encounter for routine adult health examination without abnormal findings    -  1    Medication monitoring encounter        Encounter for surveillance of contraceptive pills        CARDIOVASCULAR SCREENING; LDL GOAL LESS THAN 160        Screening for diabetes mellitus          Care Instructions      Preventive Health Recommendations  Female Ages 40 to 49    Yearly exam:     See your health care provider every year in order to  1. Review health changes.   2. Discuss preventive care.    3. Review your medicines if your doctor prescribed any.        If you get Pap tests with HPV test, you only need to test every 5 years, unless you have an abnormal result.         Ask your doctor if you should have a mammogram.      Have a colonoscopy (test for colon cancer) if someone in your family has had colon cancer or polyps before age 50.       Have a cholesterol test every 5 years.       Have a diabetes test (fasting glucose) after age 45. If you are at risk for diabetes, you should have this test every 3 years.    Shots: Get a flu shot each year. Get a tetanus shot every 10 years.  You are due in 2020.    Nutrition:     Eat at least 5 servings of fruits and vegetables each day.    Eat whole-grain bread, whole-wheat pasta and brown rice instead of white grains and rice.    Talk to your provider about Calcium and Vitamin D.     Lifestyle    Exercise at least 150 minutes a week (an average of 30 minutes a day, 5 days a week). This will help you control your weight and prevent disease.    Limit alcohol to one drink per day.    No smoking.     Wear sunscreen to prevent skin cancer.    See your dentist every six months for an exam and  cleaning.          Follow-ups after your visit        Follow-up notes from your care team     Return in about 1 year (around 3/23/2019) for Physical Exam.      Your next 10 appointments already scheduled     Apr 03, 2018  8:00 AM CDT   Chiro Acupuncture Follow Up with ELOISA Renae BURNSEMILIANO CHIRO ([facility]  )    [address]  [address]  [place] [facility] [address]   [phone]            Apr 10, 2018  8:30 AM CDT   Chiro Acupuncture Follow Up with ELOISA Renae BURNSEMILIANO CHIRO ([facility]  )    [address]  [address]  [place] [address]   [phone]            Apr 17, 2018  8:30 AM CDT   Chiro Acupuncture Follow Up with ELOISA Renae BURNSEMILIANO CHIRO ([facility]  )    [address]  [address]  [place] [id number] [phone]              Who to contact     If you have questions or need follow up information about today's clinic visit or your schedule please contact [facility] [place] directly at [phone].  Normal or non-critical lab and imaging results will be communicated to you by MyChart, letter or phone within 4 business days after the clinic has received the results. If you do not hear from us within 7 days, please contact the clinic through AFreezehart or phone. If you have a critical or abnormal lab result, we will notify you by phone as soon as possible.  Submit refill requests through Ch[employer]ani or call your pharmacy and they will forward the refill request to us. Please allow 3 business days for your refill to be completed.          Additional Information About Your Visit        MyChart Information     [employer] gives you secure access to your electronic health record. If you see a primary care provider, you can also send messages to your care team and make appointments. If you have questions, please call your primary care clinic.  If you do not have a primary care provider, please call [phone]  "and they will assist you.        Care EveryWhere ID     This is your Care EveryWhere ID. This could be used by other organizations to access your [place] medical records  [id number]        Your Vitals Were     Pulse Temperature Height Pulse Oximetry BMI (Body Mass Index)       98 95.4  F (35.2  C) (Tympanic) 5' 5.5\" (1.664 m) 99% 24.84 kg/m2        Blood Pressure from Last 3 Encounters:   03/23/18 100/66   01/30/18 100/62   01/27/18 102/72    Weight from Last 3 Encounters:   03/23/18 151 lb 9.6 oz (68.8 kg)   01/30/18 152 lb 3.2 oz (69 kg)   01/27/18 155 lb (70.3 kg)              We Performed the Following     Glucose     Hepatic panel     Lipid panel reflex to direct LDL Fasting          Today's Medication Changes          These changes are accurate as of 3/23/18  9:31 AM.  If you have any questions, ask your nurse or doctor.               Start taking these medicines.        Dose/Directions    naproxen 500 MG tablet   Commonly known as:  NAPROSYN   Used for:  Cervical pain, Acute bilateral thoracic back pain, Left foot pain   Replaces:  ibuprofen 800 MG tablet   Started by:  Kaley Carolina MD        Dose:  500 mg   Take 1 tablet (500 mg) by mouth 2 times daily (with meals)   Quantity:  60 tablet   Refills:  1         These medicines have changed or have updated prescriptions.        Dose/Directions    norgestimate-ethinyl estradiol 0.25-35 MG-MCG per tablet   Commonly known as:  SPRINTEC 28   This may have changed:  See the new instructions.   Used for:  Encounter for surveillance of contraceptive pills   Changed by:  Kaley Carolina MD        TAKE 1 TABLET BY MOUTH DAILY. TAKE ACTIVE TABLETS DAILY   Quantity:  112 tablet   Refills:  3         Stop taking these medicines if you haven't already. Please contact your care team if you have questions.     amitriptyline 10 MG tablet   Commonly known as:  ELAVIL   Stopped by:  Kaley Carolina MD           ibuprofen 800 MG tablet   Commonly known as:  ADVIL/MOTRIN "   Replaced by:  naproxen 500 MG tablet   Stopped by:  Kaley Carolina MD                Where to get your medicines      These medications were sent to [facility]/pharmacy #[id number] - [place], [place] - [address] AT [address] [place] [address], [place] [address]     Phone:  [phone]     cyclobenzaprine 10 MG tablet    naproxen 500 MG tablet    norgestimate-ethinyl estradiol 0.25-35 MG-MCG per tablet                Primary Care Provider Office Phone # Fax #    Kaley Carolina [phone] [address] [place] [address]        Equal Access to Services     [facility]: Hadii aad ku hadasho Soomaali, waaxda luqadaha, qaybta kaalmada adeegyada, christie ortega . [facility] [phone].    ATENCIÓN: Si habla español, tiene a frazier disposición servicios gratuitos de asistencia lingüística. [facility] [phone].    We comply with applicable federal civil rights laws and [place] laws. We do not discriminate on the basis of race, color, national origin, age, disability, sex, sexual orientation, or gender identity.            Thank you!     Thank you for choosing [facility]  for your care. Our goal is always to provide you with excellent care. Hearing back from our patients is one way we can continue to improve our services. Please take a few minutes to complete the written survey that you may receive in the mail after your visit with us. Thank you!             Your Updated Medication List - Protect others around you: Learn how to safely use, store and throw away your medicines at www.disposemymeds.org.          This list is accurate as of 3/23/18  9:31 AM.  Always use your most recent med list.                   Brand Name Dispense Instructions for use Diagnosis    cetirizine 10 MG tablet    zyrTEC    30 tablet    Take 1 tablet (10 mg) by mouth every evening        clobetasol 0.05 % ointment    TEMOVATE    30 g    Twice daily to rash  area in genital area during flares    Lichen sclerosus et atrophicus       cyclobenzaprine 10 MG tablet    FLEXERIL    90 tablet    Take 1 tablet (10 mg) by mouth 3 times daily as needed for muscle spasms    Cervical pain       doxycycline 100 MG tablet    VIBRA-TABS    28 tablet    Take 1 tablet (100 mg) by mouth 2 times daily    Perioral dermatitis       econazole nitrate 1 % cream     30 g    Twice daily to foot rash until clear    Tinea pedis of both feet       lactobacillus acid-pectin Caps     100 capsule    Take 1 capsule by mouth daily.        naproxen 500 MG tablet    NAPROSYN    60 tablet    Take 1 tablet (500 mg) by mouth 2 times daily (with meals)    Cervical pain, Acute bilateral thoracic back pain, Left foot pain       norgestimate-ethinyl estradiol 0.25-35 MG-MCG per tablet    SPRINTEC 28    112 tablet    TAKE 1 TABLET BY MOUTH DAILY. TAKE ACTIVE TABLETS DAILY    Encounter for surveillance of contraceptive pills
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
## 2018-03-24 DIAGNOSIS — B35.1 ONYCHOMYCOSIS: Primary | ICD-10-CM

## 2018-03-24 RX ORDER — TERBINAFINE HYDROCHLORIDE 250 MG/1
250 TABLET ORAL DAILY
Qty: 45 TABLET | Refills: 0 | Status: SHIPPED | OUTPATIENT
Start: 2018-03-24 | End: 2018-05-07

## 2018-04-03 ENCOUNTER — THERAPY VISIT (OUTPATIENT)
Dept: CHIROPRACTIC MEDICINE | Facility: CLINIC | Age: 48
End: 2018-04-03
Payer: COMMERCIAL

## 2018-04-03 DIAGNOSIS — M79.672 LEFT FOOT PAIN: ICD-10-CM

## 2018-04-03 DIAGNOSIS — Z51.81 MEDICATION MONITORING ENCOUNTER: Primary | ICD-10-CM
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
## 2018-04-03 DIAGNOSIS — M99.06 SOMATIC DYSFUNCTION OF LOWER EXTREMITIES: Primary | ICD-10-CM

## 2018-04-03 PROCEDURE — 98943 CHIROPRACT MANJ XTRSPINL 1/>: CPT | Mod: 51 | Performed by: CHIROPRACTOR

## 2018-04-03 PROCEDURE — 97810 ACUP 1/> WO ESTIM 1ST 15 MIN: CPT | Mod: GA | Performed by: CHIROPRACTOR
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
## 2018-04-10 ENCOUNTER — THERAPY VISIT (OUTPATIENT)
Dept: CHIROPRACTIC MEDICINE | Facility: CLINIC | Age: 48
End: 2018-04-10
Payer: COMMERCIAL

## 2018-04-10 DIAGNOSIS — M79.672 LEFT FOOT PAIN: Primary | ICD-10-CM
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
## 2018-04-10 DIAGNOSIS — M99.06 SOMATIC DYSFUNCTION OF LOWER EXTREMITIES: ICD-10-CM

## 2018-04-10 DIAGNOSIS — M77.9 TENDONITIS: ICD-10-CM

## 2018-04-10 PROCEDURE — 98943 CHIROPRACT MANJ XTRSPINL 1/>: CPT | Mod: 51 | Performed by: CHIROPRACTOR

## 2018-04-10 PROCEDURE — 97810 ACUP 1/> WO ESTIM 1ST 15 MIN: CPT | Mod: GA | Performed by: CHIROPRACTOR
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
## 2018-04-17 ENCOUNTER — THERAPY VISIT (OUTPATIENT)
Dept: CHIROPRACTIC MEDICINE | Facility: CLINIC | Age: 48
End: 2018-04-17
Payer: COMMERCIAL

## 2018-04-17 DIAGNOSIS — M79.672 LEFT FOOT PAIN: Primary | ICD-10-CM

## 2018-04-17 DIAGNOSIS — M77.9 TENDONITIS: ICD-10-CM

## 2018-04-17 DIAGNOSIS — M99.06 SOMATIC DYSFUNCTION OF LOWER EXTREMITIES: ICD-10-CM

## 2018-04-17 PROCEDURE — 98943 CHIROPRACT MANJ XTRSPINL 1/>: CPT | Performed by: CHIROPRACTOR

## 2018-04-17 PROCEDURE — 97810 ACUP 1/> WO ESTIM 1ST 15 MIN: CPT | Mod: GA | Performed by: CHIROPRACTOR

## 2018-04-18 ENCOUNTER — MYC MEDICAL ADVICE (OUTPATIENT)
Dept: PODIATRY | Facility: CLINIC | Age: 48
End: 2018-04-18

## 2018-04-18 ENCOUNTER — OFFICE VISIT (OUTPATIENT)
Dept: PODIATRY | Facility: CLINIC | Age: 48
End: 2018-04-18
Payer: COMMERCIAL

## 2018-04-18 ENCOUNTER — RADIANT APPOINTMENT (OUTPATIENT)
Dept: GENERAL RADIOLOGY | Facility: CLINIC | Age: 48
End: 2018-04-18
Attending: PODIATRIST
Payer: COMMERCIAL

## 2018-04-18 ENCOUNTER — HOSPITAL ENCOUNTER (OUTPATIENT)
Dept: MRI IMAGING | Facility: CLINIC | Age: 48
Discharge: HOME | End: 2018-04-18
Attending: PODIATRIST | Admitting: PODIATRIST
Payer: COMMERCIAL

## 2018-04-18 VITALS
HEIGHT: 65 IN | SYSTOLIC BLOOD PRESSURE: 116 MMHG | DIASTOLIC BLOOD PRESSURE: 70 MMHG | WEIGHT: 151 LBS | BODY MASS INDEX: 25.16 KG/M2

## 2018-04-18 DIAGNOSIS — S99.922A FOOT INJURY, LEFT, INITIAL ENCOUNTER: Primary | ICD-10-CM

## 2018-04-18 DIAGNOSIS — M21.41 FLAT FEET: ICD-10-CM

## 2018-04-18 DIAGNOSIS — S99.922A FOOT INJURY, LEFT, INITIAL ENCOUNTER: ICD-10-CM

## 2018-04-18 DIAGNOSIS — M20.12 HALLUX VALGUS OF LEFT FOOT: ICD-10-CM

## 2018-04-18 DIAGNOSIS — M77.42 METATARSALGIA OF LEFT FOOT: ICD-10-CM

## 2018-04-18 DIAGNOSIS — M21.42 FLAT FEET: ICD-10-CM

## 2018-04-18 PROCEDURE — 73718 MRI LOWER EXTREMITY W/O DYE: CPT | Mod: LT

## 2018-04-18 PROCEDURE — 99203 OFFICE O/P NEW LOW 30 MIN: CPT | Performed by: PODIATRIST

## 2018-04-18 PROCEDURE — 73630 X-RAY EXAM OF FOOT: CPT | Mod: LT

## 2018-04-18 NOTE — MR AVS SNAPSHOT
After Visit Summary   4/18/2018    Breonna Pinon    MRN: [id number]           Patient Information     Date Of Birth          1970        Visit Information        Provider Department      4/18/2018 9:45 AM Robert Sands DPM [facility]        Today's Diagnoses     Foot injury, left, initial encounter    -  1    Metatarsalgia of left foot        Flat feet        Hallux valgus of left foot          Care Instructions    Thank you for choosing [facility]!    DR. SANDS'S CLINIC LOCATIONS     [facility] [place]   [address] [address] [place] [address]   [phone] / [phone] / [phone]       THURSDAY - [place] SCHEDULE SURGERY: [phone]   [address] APPOINTMENTS: [phone]   [place] [address] BILLING QUESTIONS: [phone] / [phone]       MRI at [facility]    TOE & METATARSAL FRACTURES  The structure of the foot is complex, consisting of bones, muscles, tendons, and other soft tissues. Of the 26 bones in the foot, 19 are toe bones (phalanges) and metatarsal bones (the long bones in the midfoot). Fractures of the toe and metatarsal bones are common and require evaluation by a specialist. A foot and ankle surgeon should be seen for proper diagnosis and treatment, even if initial treatment has been received in an emergency room.  A fracture is a break in the bone. Fractures can be divided into two categories: traumatic fractures and stress fractures.  TRAMATIC FRACTURES (also called acute fractures) are caused by a direct blow or impact, such as seriously stubbing your toe. Traumatic fractures can be displaced or non-displaced. If the fracture is displaced, the bone is broken in such a way that it has changed in position (dislocated).  Signs and symptoms of a traumatic fracture include:  You may hear a sound at the time of the  break.    Pinpoint pain  (pain at the place of impact) at the time the fracture occurs and perhaps for a few hours later, but often the pain goes away after several hours.   Crooked or abnormal appearance of the toe.   Bruising and swelling the next day.   It is not true that  if you can walk on it, it s not broken.  Evaluation by a foot and ankle surgeon is always recommended.   STRESS FRACTURES are tiny, hairline breaks that are usually caused by repetitive stress. Stress fractures often afflict athletes who, for example, too rapidly increase their running mileage. They can also be caused by an abnormal foot structure, deformities, or osteoporosis. Improper footwear may also lead to stress fractures. Stress fractures should not be ignored. They require proper medical attention to heal correctly.  Symptoms of stress fractures include:  Pain with or after normal activity   Pain that goes away when resting and then returns when standing or during activity    Pinpoint pain  (pain at the site of the fracture) when touched   Swelling, but no bruising   IMPROPER TREATMENT  Some people say that  the doctor can t do anything for a broken bone in the foot.  This is usually not true. In fact, if a fractured toe or metatarsal bone is not treated correctly, serious complications may develop. For example:  A deformity in the bony architecture which may limit the ability to move the foot or cause difficulty in fitting shoes   Arthritis, which may be caused by a fracture in a joint (the juncture where two bones meet), or may be a result of angular deformities that develop when a displaced fracture is severe or hasn t been properly corrected   Chronic pain and deformity   Non-union, or failure to heal, can lead to subsequent surgery or chronic pain.   PROPER TREATMENT FOR TOES  Fractures of the toe bones are almost always traumatic fractures. Treatment for traumatic fractures depends on the break itself and may include these  options:  Rest. Sometimes rest is all that is needed to treat a traumatic fracture of the toe.   Splinting. The toe may be fitted with a splint to keep it in a fixed position.   Rigid or stiff-soled shoe. Wearing a stiff-soled shoe protects the toe and helps keep it properly positioned.    Jose taping  the fractured toe to another toe is sometimes appropriate, but in other cases it may be harmful.   Surgery. If the break is badly displaced or if the joint is affected, surgery may be necessary. Surgery often involves the use of fixation devices, such as pins.   PROPER TREATMENT OF METATARSALS  Breaks in the metatarsal bones may be either stress or traumatic fractures. Certain kinds of fractures of the metatarsal bones present unique challenges.  For example, sometimes a fracture of the first metatarsal bone (behind the big toe) can lead to arthritis. Since the big toe is used so frequently and bears more weight than other toes, arthritis in that area can make it painful to walk, bend, or even stand.  Another type of break, called a Do fracture, occurs at the base of the fifth metatarsal bone (behind the little toe). It is often misdiagnosed as an ankle sprain, and misdiagnosis can have serious consequences since sprains and fractures require different treatments. Your foot and ankle surgeon is an expert in correctly identifying these conditions as well as other problems of the foot.  Treatment of metatarsal fractures depends on the type and extent of the fracture, and may include:  Rest. Sometimes rest is the only treatment needed to promote healing of a stress or traumatic fracture of a metatarsal bone.   Avoid the offending activity. Because stress fractures result from repetitive stress, it is important to avoid the activity that led to the fracture. Crutches or a wheelchair are sometimes required to offload weight from the foot to give it time to heal.   Immobilization, casting, or rigid shoe. A stiff-soled  shoe or other form of immobilization may be used to protect the fractured bone while it is healing.   Surgery. Some traumatic fractures of the metatarsal bones require surgery, especially if the break is badly displaced.   Follow-up care. Your foot and ankle surgeon will provide instructions for care following surgical or non-surgical treatment. Physical therapy, exercises and rehabilitation may be included in a schedule for return to normal activities.   [place] ORTHOTICS LOCATIONS  [facility]  [address]  [place] [address]  Phone: [phone]  Fax: [phone] [facility]  [address]  [place] [address]  Phone: [phone]   Fax: [phone]   [facility]  [address]  [place] [address]  Phone: [phone]  Fax: [phone] [facility]  [address]  [place] [address]  Phone: [phone]   Fax: [phone]   [facility]   [address]  [place] [address]  Phone: [phone]   Fax: [phone] * Please call any location listed to make an appointment for a casting/fitting. Your referral was sent to their central office and they will all have the order on file.     [facility]OES LOCATIONS  [place]  [address] [facility]  [phone]   [address]  [phone] [place]  [address] [facility]  [phone]   [place]  [address]  [phone]   [place]  [address]  [phone] [place] [address]  [phone]   [place]  [address]  [phone]   [place]  [address]  [phone] [place]  [address]  [phone]               BODY MASS INDEX (BMI)  Many things can cause foot and ankle problems. Foot structure, activity level, foot mechanics and injuries are common causes of pain.  One very important issue that often goes  unmentioned, is body weight.  Extra weight can cause increased stress on muscles, ligaments, bones and tendons.  Sometimes just a few extra pounds is all it takes to put one over her/his threshold. Without reducing that stress, it can be difficult to alleviate pain. Some people are uncomfortable addressing this issue, but we feel it is important for you to think about it. As Foot &  Ankle specialists, our job is addressing the lower extremity problem and possible causes. Regarding extra body weight, we encourage patients to discuss diet and weight management plans with their primary care doctors. It is this team approach that gives you the best opportunity for pain relief and getting you back on your feet.                Follow-ups after your visit        Additional Services     ORTHOTICS REFERRAL       **This referral order prints off in the [facility]  (Orthotics & Prosthetics) Central Scheduling Office**    The [facility] Central Scheduling Staff will contact the patient to schedule appointments.     Central Scheduling Contact Information: [phone] ([place])    Orthotics: Foot Orthotics    Please be aware that coverage of these services is subject to the terms and limitations of your health insurance plan.  Call member services at your health plan with any benefit or coverage questions.      Please bring the following to your appointment:    >>   Any x-rays, CTs or MRIs which have been performed.  Contact the facility where they were done to arrange for  prior to your scheduled appointment.    >>   List of current medications   >>   This referral request   >>   Any documents/labs given to you for this referral                  Your next 10 appointments already scheduled     Apr 24, 2018  8:30 AM CDT   Chiro Acupuncture Follow Up with ELOISA Renae (BARI Hilton  )    [address]  [address]  [facility] [address] [phone]             May 03, 2018  8:30 AM CDT   Chiro Acupuncture Follow Up with Deni Hunt [facility]LOISA   [facility]  )    [address]  [facility] [id number] [phone]            May 08, 2018  9:30 AM CDT   Chiro Acupuncture Follow Up with ELOISA Renae CHIRO ([facility]  )    [address]  [address]  [facility] [id number] [phone]              Future tests that were ordered for you today     Open Future Orders        Priority Expected Expires Ordered    MR Ankle Left w/o Contrast Routine  4/18/2019 4/18/2018            Who to contact     If you have questions or need follow up information about today's clinic visit or your schedule please contact [facility] directly at [phone].  Normal or non-critical lab and imaging results will be communicated to you by MyChart, letter or phone within 4 business days after the clinic has received the results. If you do not hear from us within 7 days, please contact the clinic through BioBeatshart or phone. If you have a critical or abnormal lab result, we will notify you by phone as soon as possible.  Submit refill requests through Wishpot or call your pharmacy and they will forward the refill request to us. Please allow 3 business days for your refill to be completed.          Additional Information About Your Visit        MyChart Information     Wishpot gives you secure access to your electronic health record. If you see a primary care provider, you can also send messages to your care team and make appointments. If you have questions, please call your primary care clinic.  If you do not have a primary care provider, please call [phone] and they will assist you.        Care EveryWhere ID     This is your Care EveryWhere ID. This could be used by other organizations to access your [facility] medical records  [id number]        Your Vitals Were     Height BMI (Body Mass  "Index)                5' 5\" (1.651 m) 25.13 kg/m2           Blood Pressure from Last 3 Encounters:   04/18/18 116/70   03/23/18 100/66   01/30/18 100/62    Weight from Last 3 Encounters:   04/18/18 151 lb (68.5 kg)   03/23/18 151 lb 9.6 oz (68.8 kg)   01/30/18 152 lb 3.2 oz (69 kg)              We Performed the Following     ORTHOTICS REFERRAL     XR Foot Left G/E 3 Views        Primary Care Provider Office Phone # Fax #    Kaley Carolina [phone] [address] [place] [address]        Equal Access to Services     NA LUZ : Daniel Goldman, hilton fong, hiwot kaakira mcmahon, christie oretga . So [facility] [phone].    ATENCIÓN: Si habla español, tiene a frazier disposición servicios gratuitos de asistencia lingüística. Llame al [phone].    We comply with applicable federal civil rights laws and [place] laws. We do not discriminate on the basis of race, color, national origin, age, disability, sex, sexual orientation, or gender identity.            Thank you!     Thank you for choosing [facility]  for your care. Our goal is always to provide you with excellent care. Hearing back from our patients is one way we can continue to improve our services. Please take a few minutes to complete the written survey that you may receive in the mail after your visit with us. Thank you!             Your Updated Medication List - Protect others around you: Learn how to safely use, store and throw away your medicines at www.disposemymeds.org.          This list is accurate as of 4/18/18 10:38 AM.  Always use your most recent med list.                   Brand Name Dispense Instructions for use Diagnosis    cetirizine 10 MG tablet    zyrTEC    30 tablet    Take 1 tablet (10 mg) by mouth every evening        clobetasol 0.05 % ointment    TEMOVATE    30 g    Twice daily to rash area in genital area during flares    Lichen sclerosus et " atrophicus       cyclobenzaprine 10 MG tablet    FLEXERIL    90 tablet    Take 1 tablet (10 mg) by mouth 3 times daily as needed for muscle spasms    Cervical pain       doxycycline 100 MG tablet    VIBRA-TABS    28 tablet    Take 1 tablet (100 mg) by mouth 2 times daily    Perioral dermatitis       econazole nitrate 1 % cream     30 g    Twice daily to foot rash until clear    Tinea pedis of both feet       lactobacillus acid-pectin Caps     100 capsule    Take 1 capsule by mouth daily.        naproxen 500 MG tablet    NAPROSYN    60 tablet    Take 1 tablet (500 mg) by mouth 2 times daily (with meals)    Cervical pain, Acute bilateral thoracic back pain, Left foot pain       norgestimate-ethinyl estradiol 0.25-35 MG-MCG per tablet    SPRINTEC 28    112 tablet    TAKE 1 TABLET BY MOUTH DAILY. TAKE ACTIVE TABLETS DAILY    Encounter for surveillance of contraceptive pills       terbinafine 250 MG tablet    lamISIL    45 tablet    Take 1 tablet (250 mg) by mouth daily    Onychomycosis

## 2018-04-18 NOTE — PATIENT INSTRUCTIONS
Thank you for choosing [facility]!    DR. SANDS'S CLINIC LOCATIONS     MONDAY - [place] WEDNESDAY - [place]   [address] [place] [address] [place] [address]   [phone] / [phone] / [phone]       THURSDAY - [place] SCHEDULE SURGERY: [phone]   [address] APPOINTMENTS: [phone]   [place] [address] BILLING QUESTIONS: [phone] / [phone]       MRI at [facility]    TOE & METATARSAL FRACTURES  The structure of the foot is complex, consisting of bones, muscles, tendons, and other soft tissues. Of the 26 bones in the foot, 19 are toe bones (phalanges) and metatarsal bones (the long bones in the midfoot). Fractures of the toe and metatarsal bones are common and require evaluation by a specialist. A foot and ankle surgeon should be seen for proper diagnosis and treatment, even if initial treatment has been received in an emergency room.  A fracture is a break in the bone. Fractures can be divided into two categories: traumatic fractures and stress fractures.  TRAMATIC FRACTURES (also called acute fractures) are caused by a direct blow or impact, such as seriously stubbing your toe. Traumatic fractures can be displaced or non-displaced. If the fracture is displaced, the bone is broken in such a way that it has changed in position (dislocated).  Signs and symptoms of a traumatic fracture include:  You may hear a sound at the time of the break.    Pinpoint pain  (pain at the place of impact) at the time the fracture occurs and perhaps for a few hours later, but often the pain goes away after several hours.   Crooked or abnormal appearance of the toe.   Bruising and swelling the next day.   It is not true that  if you can walk on it, it s not broken.  Evaluation by a foot and ankle surgeon is always recommended.   STRESS FRACTURES are tiny, hairline breaks that are usually caused by  repetitive stress. Stress fractures often afflict athletes who, for example, too rapidly increase their running mileage. They can also be caused by an abnormal foot structure, deformities, or osteoporosis. Improper footwear may also lead to stress fractures. Stress fractures should not be ignored. They require proper medical attention to heal correctly.  Symptoms of stress fractures include:  Pain with or after normal activity   Pain that goes away when resting and then returns when standing or during activity    Pinpoint pain  (pain at the site of the fracture) when touched   Swelling, but no bruising   IMPROPER TREATMENT  Some people say that  the doctor can t do anything for a broken bone in the foot.  This is usually not true. In fact, if a fractured toe or metatarsal bone is not treated correctly, serious complications may develop. For example:  A deformity in the bony architecture which may limit the ability to move the foot or cause difficulty in fitting shoes   Arthritis, which may be caused by a fracture in a joint (the juncture where two bones meet), or may be a result of angular deformities that develop when a displaced fracture is severe or hasn t been properly corrected   Chronic pain and deformity   Non-union, or failure to heal, can lead to subsequent surgery or chronic pain.   PROPER TREATMENT FOR TOES  Fractures of the toe bones are almost always traumatic fractures. Treatment for traumatic fractures depends on the break itself and may include these options:  Rest. Sometimes rest is all that is needed to treat a traumatic fracture of the toe.   Splinting. The toe may be fitted with a splint to keep it in a fixed position.   Rigid or stiff-soled shoe. Wearing a stiff-soled shoe protects the toe and helps keep it properly positioned.    Jose taping  the fractured toe to another toe is sometimes appropriate, but in other cases it may be harmful.   Surgery. If the break is badly displaced or if the  joint is affected, surgery may be necessary. Surgery often involves the use of fixation devices, such as pins.   PROPER TREATMENT OF METATARSALS  Breaks in the metatarsal bones may be either stress or traumatic fractures. Certain kinds of fractures of the metatarsal bones present unique challenges.  For example, sometimes a fracture of the first metatarsal bone (behind the big toe) can lead to arthritis. Since the big toe is used so frequently and bears more weight than other toes, arthritis in that area can make it painful to walk, bend, or even stand.  Another type of break, called a Do fracture, occurs at the base of the fifth metatarsal bone (behind the little toe). It is often misdiagnosed as an ankle sprain, and misdiagnosis can have serious consequences since sprains and fractures require different treatments. Your foot and ankle surgeon is an expert in correctly identifying these conditions as well as other problems of the foot.  Treatment of metatarsal fractures depends on the type and extent of the fracture, and may include:  Rest. Sometimes rest is the only treatment needed to promote healing of a stress or traumatic fracture of a metatarsal bone.   Avoid the offending activity. Because stress fractures result from repetitive stress, it is important to avoid the activity that led to the fracture. Crutches or a wheelchair are sometimes required to offload weight from the foot to give it time to heal.   Immobilization, casting, or rigid shoe. A stiff-soled shoe or other form of immobilization may be used to protect the fractured bone while it is healing.   Surgery. Some traumatic fractures of the metatarsal bones require surgery, especially if the break is badly displaced.   Follow-up care. Your foot and ankle surgeon will provide instructions for care following surgical or non-surgical treatment. Physical therapy, exercises and rehabilitation may be included in a schedule for return to normal  activities.   [place] ORTHOTICS LOCATIONS  [facility]  [address]  [place] [address]  Phone: [phone]  Fax: [phone] [facility]  [address]  [place] [address]  Phone: [phone]   Fax: [phone]   [facility]  [address]  [place] [address]  Phone: [phone]  Fax: [phone] [facility]  [address]  [place] [address]  Phone: [phone]   Fax: [phone]   [facility]   [address]  [place] [address]  Phone: [phone]   Fax: [phone] * Please call any location listed to make an appointment for a casting/fitting. Your referral was sent to their central office and they will all have the order on file.     [facility] SHOES LOCATIONS  [place]  [address] [facility]  [phone]   [address]  [phone] [place]  [address] [facility]  [phone]   [place]  [address] [place] [address] [address]  [phone]   [place]  [address]  [phone] [place] [address]  [phone]   [place]  [address]  [phone]   [place]  [address]  [phone] [place]  [address]  [phone]               BODY MASS INDEX (BMI)  Many things can cause foot and ankle problems. Foot structure, activity level, foot mechanics and injuries are common causes of pain.  One very important issue that often goes unmentioned, is body weight.  Extra weight can cause increased stress on muscles, ligaments, bones and tendons.  Sometimes just a few extra pounds is all it takes to put one over her/his threshold. Without reducing that stress, it can be difficult to alleviate pain. Some people are uncomfortable addressing this issue, but we feel it is important for you to think about it. As Foot &  Ankle specialists, our job is addressing the lower extremity problem  and possible causes. Regarding extra body weight, we encourage patients to discuss diet and weight management plans with their primary care doctors. It is this team approach that gives you the best opportunity for pain relief and getting you back on your feet.
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
## 2018-04-19 ENCOUNTER — MYC MEDICAL ADVICE (OUTPATIENT)
Dept: PODIATRY | Facility: CLINIC | Age: 48
End: 2018-04-19

## 2018-04-19 DIAGNOSIS — M79.672 LEFT FOOT PAIN: ICD-10-CM

## 2018-04-19 DIAGNOSIS — M77.9 TENDONITIS: Primary | ICD-10-CM

## 2018-04-19 NOTE — TELEPHONE ENCOUNTER
Please see [facility] message and advise.     Routing to Dr. Natarajan.     PRISCILLA Morris RN

## 2018-04-19 NOTE — TELEPHONE ENCOUNTER
I recommend that she use it for 2 weeks.  I do not want her to use it more than a months.  MRI results will help determine how long, but two weeks is a good start.  Hopefully that will be all she needs.    Dr. Natarajan
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
## 2018-04-24 ENCOUNTER — MYC MEDICAL ADVICE (OUTPATIENT)
Dept: PODIATRY | Facility: CLINIC | Age: 48
End: 2018-04-24

## 2018-04-24 ENCOUNTER — THERAPY VISIT (OUTPATIENT)
Dept: CHIROPRACTIC MEDICINE | Facility: CLINIC | Age: 48
End: 2018-04-24
Payer: COMMERCIAL
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
## 2018-04-24 DIAGNOSIS — M79.672 LEFT FOOT PAIN: Primary | ICD-10-CM

## 2018-04-24 DIAGNOSIS — M99.06 SOMATIC DYSFUNCTION OF LOWER EXTREMITIES: ICD-10-CM

## 2018-04-24 PROBLEM — M77.9 TENDONITIS: Status: RESOLVED | Noted: 2018-04-10 | Resolved: 2018-04-24

## 2018-04-24 PROCEDURE — 97810 ACUP 1/> WO ESTIM 1ST 15 MIN: CPT | Mod: GA | Performed by: CHIROPRACTOR
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
## 2018-05-03 ENCOUNTER — THERAPY VISIT (OUTPATIENT)
Dept: CHIROPRACTIC MEDICINE | Facility: CLINIC | Age: 48
End: 2018-05-03
Payer: COMMERCIAL

## 2018-05-03 DIAGNOSIS — M79.672 LEFT FOOT PAIN: Primary | ICD-10-CM

## 2018-05-03 DIAGNOSIS — M99.06 SOMATIC DYSFUNCTION OF LOWER EXTREMITIES: ICD-10-CM

## 2018-05-03 PROBLEM — M54.6 ACUTE BILATERAL THORACIC BACK PAIN: Status: RESOLVED | Noted: 2018-03-01 | Resolved: 2018-05-03

## 2018-05-03 PROBLEM — S16.1XXD STRAIN OF NECK MUSCLE, SUBSEQUENT ENCOUNTER: Status: RESOLVED | Noted: 2018-03-01 | Resolved: 2018-05-03

## 2018-05-03 PROBLEM — M99.02 THORACIC SEGMENT DYSFUNCTION: Status: RESOLVED | Noted: 2018-03-01 | Resolved: 2018-05-03
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
## 2018-05-03 PROBLEM — M25.512 ACUTE PAIN OF LEFT SHOULDER: Status: RESOLVED | Noted: 2018-02-07 | Resolved: 2018-05-03

## 2018-05-03 PROCEDURE — 97810 ACUP 1/> WO ESTIM 1ST 15 MIN: CPT | Mod: GA | Performed by: CHIROPRACTOR

## 2018-05-03 NOTE — PROGRESS NOTES
Visit #:  9    Subjective:  Breonna Pinon is a 47 year old female who is seen in f/u up for:        Somatic dysfunction of lower extremities  Left foot pain.     Since last visit on Visit date not found,  Breonna Pinon reports:    Area of chief complaint:  Cervical, Thoracic and Extra-spinal :  Symptoms are graded at 3-4/10. The quality is described as stiff, achey, dull.  Motion has increased, but is still not normal . Patient states overall she has reached 50% improvement. She has been using the boot for one week and she feels it is decreasing her pain. She needed to go to [place] this weekend and needed to walk for extended periods. She states she is slightly sore today. The pt denies weakness or other unusual sx.     Since last visit the patient feels that they have improved.      Objective:  The following was observed:    P: pain elicited on palpation, C2, C5, T1, T7, L CUBOID AND METATARSAL 4 AND 5  A: static palpation demonstrates intersegmental asymmetry   R: motion palpation notes restricted motion  T: hypertonicity at: Levator scapulae and Sub-occipital Bilaterally    Segmental spinal dysfunction/restrictions found at:  C2, C5, T1, T7, L CUBOID AND METATARSAL 4 AND 5      Assessment:    Diagnoses:      1. Left foot pain    2. Somatic dysfunction of lower extremities        Patient's condition:  Pt responding well to therapy and boot    Treatment effectiveness:  Slow improvement       Procedures:  CMT: No adjustment today  69660: drop table on cuboid  LAD on L 4 and 5 metatarsal with cavitation    Modalities:  63116: Acupuncture, for 15 minutes:  Points: ACP:L supine to the L foot. SP36 15 minutes. Pt is supine      Therapeutic procedures:  None    Response to Treatment  Reduction in symptoms as reported by patient    Prognosis: Excellent    Progress towards Goals: Patient is making progress towards the goal.Goals:  SLEEPING: the patient specific goal is to attain his pre-injury status of 6 hours  comfortably  Turn head without pain  Walk one hour with the dog without L foot pain (currently 1 block)         Recommendations:   Instructions:none     Follow-up:  Return to care 1-2 weeks

## 2018-05-04 ENCOUNTER — MYC MEDICAL ADVICE (OUTPATIENT)
Dept: PODIATRY | Facility: CLINIC | Age: 48
End: 2018-05-04

## 2018-05-07 DIAGNOSIS — Z51.81 MEDICATION MONITORING ENCOUNTER: ICD-10-CM

## 2018-05-07 LAB
ALBUMIN SERPL-MCNC: 3.5 G/DL (ref 3.4–5)
ALP SERPL-CCNC: 64 U/L (ref 40–150)
ALT SERPL W P-5'-P-CCNC: 19 U/L (ref 0–50)
AST SERPL W P-5'-P-CCNC: 13 U/L (ref 0–45)
BILIRUB DIRECT SERPL-MCNC: <0.1 MG/DL (ref 0–0.2)
BILIRUB SERPL-MCNC: 0.2 MG/DL (ref 0.2–1.3)
PROT SERPL-MCNC: 6.7 G/DL (ref 6.8–8.8)

## 2018-05-07 PROCEDURE — 80076 HEPATIC FUNCTION PANEL: CPT | Performed by: DERMATOLOGY

## 2018-05-07 PROCEDURE — 36415 COLL VENOUS BLD VENIPUNCTURE: CPT | Performed by: DERMATOLOGY

## 2018-05-08 ENCOUNTER — THERAPY VISIT (OUTPATIENT)
Dept: CHIROPRACTIC MEDICINE | Facility: CLINIC | Age: 48
End: 2018-05-08
Payer: COMMERCIAL

## 2018-05-08 DIAGNOSIS — M99.06 SOMATIC DYSFUNCTION OF LOWER EXTREMITIES: Primary | ICD-10-CM

## 2018-05-08 DIAGNOSIS — M79.672 LEFT FOOT PAIN: ICD-10-CM

## 2018-05-08 PROCEDURE — 97810 ACUP 1/> WO ESTIM 1ST 15 MIN: CPT | Mod: GA | Performed by: CHIROPRACTOR

## 2018-05-08 NOTE — PROGRESS NOTES
Visit #:  10    Subjective:  Breonna Pinon is a 47 year old female who is seen in f/u up for:        Somatic dysfunction of lower extremities  Left foot pain.     Since last visit on Visit date not found,  Breonna Pinon reports:    Area of chief complaint:  Cervical, Thoracic and Extra-spinal :  Symptoms are graded at 1-2/10. The quality is described as stiff, achey, dull.  Motion has increased, but is still not normal . Patient states overall she has reached 85% to 90% improvement after wearing the boot on the L foot. She notes only a slight ache at the top of the foot. She is pleased with her improvement. The pt denies weakness in the extremities or other unusual sx.       Since last visit the patient feels that they have improved and approaching MMI      Objective:  The following was observed:    P: pain elicited on palpation, C2, C5, T1, T7, L CUBOID AND METATARSAL 4 AND 5  A: static palpation demonstrates intersegmental asymmetry   R: motion palpation notes restricted motion  T: hypertonicity at: Levator scapulae and Sub-occipital Bilaterally    Segmental spinal dysfunction/restrictions found at:  C2, C5, T1, T7, L CUBOID AND METATARSAL 4 AND 5      Assessment:    Diagnoses:      1. Somatic dysfunction of lower extremities    2. Left foot pain        Patient's condition:  Pt  Approaching MMI    Treatment effectiveness:  Slow improvement       Procedures:  CMT: No adjustment today      Modalities:  97936: Acupuncture, for 15 minutes:  Points: ACP:L supine to the L foot. SP36 15 minutes. Pt is supine      Therapeutic procedures:  None    Response to Treatment  Reduction in symptoms as reported by patient    Prognosis: Excellent    Progress towards Goals: Patient is making progress towards the goal.Goals:  SLEEPING: the patient specific goal is to attain his pre-injury status of 6 hours comfortably  Turn head without pain  Walk one hour with the dog without L foot pain (currently 1  block)         Recommendations:   Instructions:none     Follow-up:  Return to care 1-2 weeks

## 2018-05-10 ENCOUNTER — MYC MEDICAL ADVICE (OUTPATIENT)
Dept: PODIATRY | Facility: CLINIC | Age: 48
End: 2018-05-10

## 2018-05-10 DIAGNOSIS — B35.1 ONYCHOMYCOSIS: ICD-10-CM

## 2018-05-10 RX ORDER — TERBINAFINE HYDROCHLORIDE 250 MG/1
250 TABLET ORAL DAILY
Qty: 45 TABLET | Refills: 0 | Status: CANCELLED | OUTPATIENT
Start: 2018-05-10

## 2018-05-11 DIAGNOSIS — B35.1 ONYCHOMYCOSIS: ICD-10-CM

## 2018-05-11 RX ORDER — TERBINAFINE HYDROCHLORIDE 250 MG/1
250 TABLET ORAL DAILY
Qty: 45 TABLET | Refills: 0 | Status: SHIPPED | OUTPATIENT
Start: 2018-05-11 | End: 2019-04-05

## 2018-05-14 NOTE — TELEPHONE ENCOUNTER
Please see [facility]ARY[facility] rosalbahart message from patient.     Routing to both Dr. Natarajan and podiatry pool.     PRISCILLA Morris RN

## 2018-05-14 NOTE — TELEPHONE ENCOUNTER
This patient is welcome to return to clinic for an update and check of her foot.     Dr. Natarajan

## 2018-05-22 ENCOUNTER — THERAPY VISIT (OUTPATIENT)
Dept: CHIROPRACTIC MEDICINE | Facility: CLINIC | Age: 48
End: 2018-05-22
Payer: COMMERCIAL

## 2018-05-22 DIAGNOSIS — M99.06 SOMATIC DYSFUNCTION OF LOWER EXTREMITIES: ICD-10-CM

## 2018-05-22 DIAGNOSIS — M54.50 LUMBAGO: ICD-10-CM

## 2018-05-22 DIAGNOSIS — M79.672 LEFT FOOT PAIN: Primary | ICD-10-CM

## 2018-05-22 DIAGNOSIS — M99.04 SOMATIC DYSFUNCTION OF SACRAL REGION: ICD-10-CM

## 2018-05-22 PROCEDURE — 98940 CHIROPRACT MANJ 1-2 REGIONS: CPT | Mod: AT | Performed by: CHIROPRACTOR

## 2018-05-22 PROCEDURE — 97810 ACUP 1/> WO ESTIM 1ST 15 MIN: CPT | Performed by: CHIROPRACTOR

## 2018-05-22 PROCEDURE — 98943 CHIROPRACT MANJ XTRSPINL 1/>: CPT | Mod: 51 | Performed by: CHIROPRACTOR

## 2018-05-22 NOTE — PROGRESS NOTES
Visit #:  11    Subjective:  Breonna Pinon is a 47 year old female who is seen in f/u up for: lower back pain/stiffness and left foot pain     Data Unavailable.     Breonna is a patient of Dr. Lazo. Since last visit on 5/8/18,  Breonna Pinon reports: overall improving, less left foot pain. Still having pain over the lateral side of the left foot.     Area of chief complaint:  Lumbar and Extra-spinal :  Symptoms are graded at 3/10. The quality is described as stiff, achey, dull.  Motion has increased, but is still not normal . Patient is now out of the CAM boot. States the CAM boot through her lower back off. She she is now wearing orthotics in new balance shoes.     Since last visit the patient feels that they have improved.      Objective:  The following was observed:    P: pain elicited on palpation, L5, SIJ, L CUBOID AND METATARSAL 4 AND 5  A: static palpation demonstrates intersegmental asymmetry -L5, SIJ, subtalor nemesio  R: motion palpation notes restricted motion  T: hypertonicity at: Levator scapulae and Sub-occipital Bilaterally    Segmental spinal dysfunction/restrictions found at:  L5, SIJ CUBOID AND METATARSAL 4 AND 5      Assessment:    Diagnoses:      No diagnosis found.    Patient's condition:  Pt responding well to therapy    Treatment effectiveness:  Slow improvement       Procedures:  CMT:   Spinal manipulation to L5, SIJ-drop piece and side posture  Extra-spinal manipulation: 57802: LAD on L 4 and 5 metatarsal with cavitation    Modalities:  17447: Acupuncture, for 15 minutes:  Points: ACP:L foot, lateral side, peroneal tendons.      Therapeutic procedures:  None    Response to Treatment  Reduction in symptoms as reported by patient    Prognosis: Excellent    Progress towards Goals: Patient is making progress towards the goal.Goals:  SLEEPING: the patient specific goal is to attain his pre-injury status of 6 hours comfortably  Turn head without pain  Walk one hour with the dog without L foot  pain (currently 1 block)         Recommendations:   Instructions:none     Follow-up:  Return to care 1 week for stability

## 2018-05-29 ENCOUNTER — THERAPY VISIT (OUTPATIENT)
Dept: CHIROPRACTIC MEDICINE | Facility: CLINIC | Age: 48
End: 2018-05-29
Payer: COMMERCIAL

## 2018-05-29 DIAGNOSIS — M79.672 LEFT FOOT PAIN: ICD-10-CM

## 2018-05-29 DIAGNOSIS — M99.06 SOMATIC DYSFUNCTION OF LOWER EXTREMITIES: ICD-10-CM

## 2018-05-29 PROCEDURE — 98943 CHIROPRACT MANJ XTRSPINL 1/>: CPT | Performed by: CHIROPRACTOR

## 2018-05-29 PROCEDURE — 97810 ACUP 1/> WO ESTIM 1ST 15 MIN: CPT | Performed by: CHIROPRACTOR

## 2018-05-29 NOTE — PROGRESS NOTES
Visit #:  12    Subjective:  Breonna Pinon is a 47 year old female who is seen in f/u up for: lower back pain/stiffness and left foot pain        Somatic dysfunction of lower extremities  Left foot pain.     Breonna is a patient of Dr. Lazo. Since last visit on 5/22/18,  Breonna Pinon reports: overall improving, less left foot pain. Still having pain over the lateral side of the left foot.     Area of chief complaint:  Lumbar and Extra-spinal :  Symptoms are graded at 2-3/10. The quality is described as stiff, achey, dull.  Motion has increased, but is still not normal . Patient is now out of the CAM boot. States the CAM boot through her lower back off. She she is now wearing orthotics in new balance shoes.     Since last visit the patient feels that they have improved.      Objective:  The following was observed:    P: pain elicited on palpation, L5, SIJ, L CUBOID AND METATARSAL 4 AND 5  A: static palpation demonstrates intersegmental asymmetry -L5, SIJ, subtalor nemesio  R: motion palpation notes restricted motion  T: hypertonicity at: Levator scapulae and Sub-occipital Bilaterally    Segmental spinal dysfunction/restrictions found at:  L5, SIJ CUBOID AND METATARSAL 4 AND 5      Assessment:    Diagnoses:      1. Somatic dysfunction of lower extremities    2. Left foot pain        Patient's condition:  Pt responding well to therapy    Treatment effectiveness:  Slow improvement       Procedures:  CMT:   Spinal manipulation to L5, SIJ-drop piece and side posture  Extra-spinal manipulation: 57564: LAD on L 4 and 5 metatarsal with cavitation    Modalities:  34119: Acupuncture, for 15 minutes:  Points: ACP:L foot, lateral side, peroneal tendons.      Therapeutic procedures:  None    Response to Treatment  Reduction in symptoms as reported by patient    Prognosis: Excellent    Progress towards Goals: Patient is making progress towards the goal.Goals:  SLEEPING: the patient specific goal is to attain his pre-injury  status of 6 hours comfortably  Turn head without pain  Walk one hour with the dog without L foot pain (currently 1 block)         Recommendations:   Instructions:none     Follow-up:  Return to care 1 week for stability

## 2018-06-05 ENCOUNTER — THERAPY VISIT (OUTPATIENT)
Dept: CHIROPRACTIC MEDICINE | Facility: CLINIC | Age: 48
End: 2018-06-05
Payer: COMMERCIAL

## 2018-06-05 DIAGNOSIS — M99.06 SOMATIC DYSFUNCTION OF LOWER EXTREMITIES: ICD-10-CM

## 2018-06-05 DIAGNOSIS — M79.672 LEFT FOOT PAIN: ICD-10-CM

## 2018-06-05 PROCEDURE — 98943 CHIROPRACT MANJ XTRSPINL 1/>: CPT | Performed by: CHIROPRACTOR

## 2018-06-05 PROCEDURE — 97810 ACUP 1/> WO ESTIM 1ST 15 MIN: CPT | Performed by: CHIROPRACTOR

## 2018-06-05 NOTE — PROGRESS NOTES
Visit #:  13    Subjective:  Breonna Pinon is a 47 year old female who is seen in f/u up for: lower back pain/stiffness and left foot pain        Somatic dysfunction of lower extremities  Left foot pain.     Since last visit on 5/29/18,  Breonna Pinon reports: overall improving, less left foot pain. Still having pain over the lateral side of the left foot.     Area of chief complaint:  Lumbar and Extra-spinal :  Symptoms are graded at 2/10. The quality is described as stiff, achey, dull.  Motion has increased, but is still not normal.    Since last visit the patient feels that they have improved.      Objective:  The following was observed:    P: pain elicited on palpation, L5, SIJ, L CUBOID AND METATARSAL 4 AND 5  A: static palpation demonstrates intersegmental asymmetry -L5, SIJ, subtalor nemesio  R: motion palpation notes restricted motion  T: hypertonicity at: Levator scapulae and Sub-occipital Bilaterally    Segmental spinal dysfunction/restrictions found at:  L5, SIJ CUBOID AND METATARSAL 4 AND 5      Assessment:    Diagnoses:      1. Somatic dysfunction of lower extremities    2. Left foot pain        Patient's condition:  Pt responding well to therapy    Treatment effectiveness:  Slow improvement       Procedures:  CMT:   Spinal manipulation to L5, SIJ-drop piece and side posture  Extra-spinal manipulation: 22728: LAD on L 4 and 5 metatarsal with cavitation    Modalities:  97896: Acupuncture, for 15 minutes:  Points: ACP:L foot, lateral side, peroneal tendons.      Therapeutic procedures:  None    Response to Treatment  Reduction in symptoms as reported by patient    Prognosis: Excellent    Progress towards Goals: Patient is making progress towards the goal.Goals:  SLEEPING: the patient specific goal is to attain his pre-injury status of 6 hours comfortably  Turn head without pain  Walk one hour with the dog without L foot pain (currently 1 block)         Recommendations:   Instructions:none     Follow-up:   Return to care if symptoms persist.

## 2018-06-20 ENCOUNTER — OFFICE VISIT (OUTPATIENT)
Dept: PODIATRY | Facility: CLINIC | Age: 48
End: 2018-06-20
Payer: COMMERCIAL

## 2018-06-20 VITALS — RESPIRATION RATE: 16 BRPM | BODY MASS INDEX: 25.16 KG/M2 | HEIGHT: 65 IN | WEIGHT: 151 LBS

## 2018-06-20 DIAGNOSIS — M79.672 BILATERAL FOOT PAIN: Primary | ICD-10-CM

## 2018-06-20 DIAGNOSIS — M79.671 BILATERAL FOOT PAIN: Primary | ICD-10-CM

## 2018-06-20 DIAGNOSIS — M77.41 METATARSALGIA OF BOTH FEET: ICD-10-CM

## 2018-06-20 DIAGNOSIS — M77.42 METATARSALGIA OF BOTH FEET: ICD-10-CM

## 2018-06-20 PROCEDURE — 99213 OFFICE O/P EST LOW 20 MIN: CPT | Performed by: PODIATRIST

## 2018-06-20 NOTE — PROGRESS NOTES
ASSESSMENT/PLAN:    Encounter Diagnoses   Name Primary?     Bilateral foot pain Yes     Metatarsalgia of both feet      I explained that I do not find anything clinically to explain the lateral ankle pain. She is to continue doing the exercises recommended by her chiropractor.  I encouraged ongoing use of supportive shoes and custom orthoses.  I offered physical therapy. She opts to wait and monitor. She will return if the discomfort worsens.  If this occurs, an MRI is reasonable.     I think that her long 2nd metatarsals in association with the bilateral hallux abducto valgus is the cause of her intermittent forefoot pain.     Recommendations:   Stiff soled shoes  Continue custom orthoses  Avoid barefoot walking    Body mass index is 25.13 kg/(m^2).      Robert Natarajan DPM, FACFAS, MS    [facility]/Foot & Ankle Surgery      ____________________________________________________________________    HPI:         Chief Complaint: follow up left 2nd metatarsal stress fracture. She reports that the pain has resolved. She wore a CAM Walker until symptoms subsided  She is now wearing quality, supportive shoes. She has custom orthoses.   She describes bilateral forefoot pain, foot stiffness and some discomfort over the lateral left ankle. These discomforts do not limit her activity.        Past Medical History:   Diagnosis Date     Abnormal Pap smear      Allergic rhinitis, cause unspecified      Anemia      Fertility problem      Other acne    *  *  Past Surgical History:   Procedure Laterality Date     BIOPSY OF SKIN LESION  2014     COLPOSCOPY,LOOP ELECTRD CERVIX EXCIS       D & C  12/29/10    suction D&C for missed      HC REMOVAL GALLBLADDER       HC REMOVE TONSILS/ADENOIDS,<11 Y/O       LAPAROSCOPY PROCEDURE UNLISTED  2010    removal of R ampullary ectopic pregnancy     LASIK     *  *  Current Outpatient Prescriptions   Medication Sig Dispense Refill     cetirizine (ZYRTEC) 10  "MG tablet Take 1 tablet (10 mg) by mouth every evening 30 tablet 1     clobetasol (TEMOVATE) 0.05 % ointment Twice daily to rash area in genital area during flares 30 g 3     cyclobenzaprine (FLEXERIL) 10 MG tablet Take 1 tablet (10 mg) by mouth 3 times daily as needed for muscle spasms 90 tablet 1     doxycycline (VIBRA-TABS) 100 MG tablet Take 1 tablet (100 mg) by mouth 2 times daily 28 tablet 1     econazole nitrate 1 % cream Twice daily to foot rash until clear 30 g 3     Lactobacillus Acid-Pectin CAPS Take 1 capsule by mouth daily. 100 capsule prn     naproxen (NAPROSYN) 500 MG tablet Take 1 tablet (500 mg) by mouth 2 times daily (with meals) 60 tablet 1     norgestimate-ethinyl estradiol (SPRINTEC 28) 0.25-35 MG-MCG per tablet TAKE 1 TABLET BY MOUTH DAILY. TAKE ACTIVE TABLETS DAILY 112 tablet 3     order for DME Equipment being ordered: Short Aircast Boot 1 Device 0     terbinafine (LAMISIL) 250 MG tablet Take 1 tablet (250 mg) by mouth daily 45 tablet 0       EXAM:    Vitals: Resp 16  Ht 5' 5\" (1.651 m)  Wt 151 lb (68.5 kg)  BMI 25.13 kg/m2  BMI: Body mass index is 25.13 kg/(m^2).  Height: 5' 5\"    Constitutional/ general:  Pt is in no apparent distress, appears well-nourished.  Cooperative with history and physical exam.     Vascular:  Pedal pulses are palpable bilaterally for both the DP and PT arteries.  CFT < 3 sec.  No edema.  Pedal hair growth noted.     Neuro:  Alert and oriented x 3. Coordinated gait.  Light touch sensation is intact to the L4, L5, S1 distributions. No obvious deficits.  No evidence of neurological-based weakness, spasticity, or contracture in the lower extremities.     Derm: Normal texture and turgor.  No erythema, ecchymosis, or cyanosis.  No open lesions.     Musculoskeletal:    Lower extremity muscle strength is normal.  Patient is ambulatory without an assistive device or brace .  Hallux abducto valgus deformity, bilateral foot.  Reducible in the transverse plane with " preserved sagittal plane ROM.  No crepitus.  On palpatory exam, the 2nd metatarsals appear longer than the first.     I was not able to reproduce the left lateral ankle pain. I tested the peroneal tendons. No subluxation noted.       Robert Natarajan DPM, FACFAS, MS    [facility] of Podiatry/Foot & Ankle Surgery

## 2018-06-20 NOTE — LETTER
2018         RE: Breonna Pinon  [id number]  [place] [address]        Dear Colleague,    Thank you for referring your patient, Breonna Pinon, to the [facility]. Please see a copy of my visit note below.    ASSESSMENT/PLAN:    Encounter Diagnoses   Name Primary?     Bilateral foot pain Yes     Metatarsalgia of both feet      I explained that I do not find anything clinically to explain the lateral ankle pain. She is to continue doing the exercises recommended by her chiropractor.  I encouraged ongoing use of supportive shoes and custom orthoses.  I offered physical therapy. She opts to wait and monitor. She will return if the discomfort worsens.  If this occurs, an MRI is reasonable.     I think that her long 2nd metatarsals in association with the bilateral hallux abducto valgus is the cause of her intermittent forefoot pain.     Recommendations:   Stiff soled shoes  Continue custom orthoses  Avoid barefoot walking    Body mass index is 25.13 kg/(m^2).      Robert Natarajan DPM, FACFAS, MS    [facility] of Podiatry/Foot & Ankle Surgery      ____________________________________________________________________    HPI:         Chief Complaint: follow up left 2nd metatarsal stress fracture. She reports that the pain has resolved. She wore a CAM Walker until symptoms subsided  She is now wearing quality, supportive shoes. She has custom orthoses.   She describes bilateral forefoot pain, foot stiffness and some discomfort over the lateral left ankle. These discomforts do not limit her activity.        Past Medical History:   Diagnosis Date     Abnormal Pap smear      Allergic rhinitis, cause unspecified      Anemia      Fertility problem      Other acne    *  *  Past Surgical History:   Procedure Laterality Date     BIOPSY OF SKIN LESION  2014     COLPOSCOPY,LOOP ELECTRD CERVIX EXCIS       D & C  12/29/10    suction D&C for missed      HC REMOVAL GALLBLADDER       HC  "REMOVE TONSILS/ADENOIDS,<13 Y/O       LAPAROSCOPY PROCEDURE UNLISTED  6/2010    removal of R ampullary ectopic pregnancy     LASIK     *  *  Current Outpatient Prescriptions   Medication Sig Dispense Refill     cetirizine (ZYRTEC) 10 MG tablet Take 1 tablet (10 mg) by mouth every evening 30 tablet 1     clobetasol (TEMOVATE) 0.05 % ointment Twice daily to rash area in genital area during flares 30 g 3     cyclobenzaprine (FLEXERIL) 10 MG tablet Take 1 tablet (10 mg) by mouth 3 times daily as needed for muscle spasms 90 tablet 1     doxycycline (VIBRA-TABS) 100 MG tablet Take 1 tablet (100 mg) by mouth 2 times daily 28 tablet 1     econazole nitrate 1 % cream Twice daily to foot rash until clear 30 g 3     Lactobacillus Acid-Pectin CAPS Take 1 capsule by mouth daily. 100 capsule prn     naproxen (NAPROSYN) 500 MG tablet Take 1 tablet (500 mg) by mouth 2 times daily (with meals) 60 tablet 1     norgestimate-ethinyl estradiol (SPRINTEC 28) 0.25-35 MG-MCG per tablet TAKE 1 TABLET BY MOUTH DAILY. TAKE ACTIVE TABLETS DAILY 112 tablet 3     order for DME Equipment being ordered: Short Aircast Boot 1 Device 0     terbinafine (LAMISIL) 250 MG tablet Take 1 tablet (250 mg) by mouth daily 45 tablet 0       EXAM:    Vitals: Resp 16  Ht 5' 5\" (1.651 m)  Wt 151 lb (68.5 kg)  BMI 25.13 kg/m2  BMI: Body mass index is 25.13 kg/(m^2).  Height: 5' 5\"    Constitutional/ general:  Pt is in no apparent distress, appears well-nourished.  Cooperative with history and physical exam.     Vascular:  Pedal pulses are palpable bilaterally for both the DP and PT arteries.  CFT < 3 sec.  No edema.  Pedal hair growth noted.     Neuro:  Alert and oriented x 3. Coordinated gait.  Light touch sensation is intact to the L4, L5, S1 distributions. No obvious deficits.  No evidence of neurological-based weakness, spasticity, or contracture in the lower extremities.     Derm: Normal texture and turgor.  No erythema, ecchymosis, or cyanosis.  No open " lesions.     Musculoskeletal:    Lower extremity muscle strength is normal.  Patient is ambulatory without an assistive device or brace .  Hallux abducto valgus deformity, bilateral foot.  Reducible in the transverse plane with preserved sagittal plane ROM.  No crepitus.  On palpatory exam, the 2nd metatarsals appear longer than the first.     I was not able to reproduce the left lateral ankle pain. I tested the peroneal tendons. No subluxation noted.       Robert Natarajan DPM, FACFAS, MS    [facility] of Podiatry/Foot & Ankle Surgery              Again, thank you for allowing me to participate in the care of your patient.        Sincerely,        Robert Natarajan DPM

## 2018-06-20 NOTE — MR AVS SNAPSHOT
"              After Visit Summary   6/20/2018    Breonna Pinon    MRN: [id number]           Patient Information     Date Of Birth          1970        Visit Information        Provider Department      6/20/2018 11:45 AM Robert Natarajan DPM [facility]        Today's Diagnoses     Bilateral foot pain    -  1    Metatarsalgia of both feet           Follow-ups after your visit        Who to contact     If you have questions or need follow up information about today's clinic visit or your schedule please contact [facility] directly at [phone].  Normal or non-critical lab and imaging results will be communicated to you by [employer], letter or phone within 4 business days after the clinic has received the results. If you do not hear from us within 7 days, please contact the clinic through foodjunkyt or phone. If you have a critical or abnormal lab result, we will notify you by phone as soon as possible.  Submit refill requests through [employer] or call your pharmacy and they will forward the refill request to us. Please allow 3 business days for your refill to be completed.          Additional Information About Your Visit        MyChart Information     [employer] gives you secure access to your electronic health record. If you see a primary care provider, you can also send messages to your care team and make appointments. If you have questions, please call your primary care clinic.  If you do not have a primary care provider, please call [phone] and they will assist you.        Care EveryWhere ID     This is your Care EveryWhere ID. This could be used by other organizations to access your [facility] medical records  [id number]        Your Vitals Were     Respirations Height BMI (Body Mass Index)             16 5' 5\" (1.651 m) 25.13 kg/m2          Blood Pressure from Last 3 Encounters:   04/18/18 116/70   03/23/18 100/66   01/30/18 100/62    Weight from Last 3 Encounters:   06/20/18 151 lb " (68.5 kg)   04/18/18 151 lb (68.5 kg)   03/23/18 151 lb 9.6 oz (68.8 kg)              Today, you had the following     No orders found for display       Primary Care Provider Office Phone # Fax #    Kaley Carolina [phone] [address] [place] [address]        Equal Access to Services     [facility]: Hadii aad ku hadasho Soomaali, waaxda luqadaha, qaybta kaalmada adeegyada, waxay idiin hayaan adeeg yokasta laIzaaan . So [facility] [phone].    ATENCIÓN: Si habla espoxana, tiene a frazier disposición servicios gratuitos de asistencia lingüística. [facility] [phone].    We comply with applicable federal civil rights laws and [place] laws. We do not discriminate on the basis of race, color, national origin, age, disability, sex, sexual orientation, or gender identity.            Thank you!     Thank you for choosing [facility]  for your care. Our goal is always to provide you with excellent care. Hearing back from our patients is one way we can continue to improve our services. Please take a few minutes to complete the written survey that you may receive in the mail after your visit with us. Thank you!             Your Updated Medication List - Protect others around you: Learn how to safely use, store and throw away your medicines at www.disposemymeds.org.          This list is accurate as of 6/20/18 12:53 PM.  Always use your most recent med list.                   Brand Name Dispense Instructions for use Diagnosis    cetirizine 10 MG tablet    zyrTEC    30 tablet    Take 1 tablet (10 mg) by mouth every evening        clobetasol 0.05 % ointment    TEMOVATE    30 g    Twice daily to rash area in genital area during flares    Lichen sclerosus et atrophicus       cyclobenzaprine 10 MG tablet    FLEXERIL    90 tablet    Take 1 tablet (10 mg) by mouth 3 times daily as needed for muscle spasms    Cervical pain       doxycycline 100 MG tablet    VIBRA-TABS    28 tablet     Take 1 tablet (100 mg) by mouth 2 times daily    Perioral dermatitis       econazole nitrate 1 % cream     30 g    Twice daily to foot rash until clear    Tinea pedis of both feet       lactobacillus acid-pectin Caps     100 capsule    Take 1 capsule by mouth daily.        naproxen 500 MG tablet    NAPROSYN    60 tablet    Take 1 tablet (500 mg) by mouth 2 times daily (with meals)    Cervical pain, Acute bilateral thoracic back pain, Left foot pain       norgestimate-ethinyl estradiol 0.25-35 MG-MCG per tablet    SPRINTEC 28    112 tablet    TAKE 1 TABLET BY MOUTH DAILY. TAKE ACTIVE TABLETS DAILY    Encounter for surveillance of contraceptive pills       order for DME     1 Device    Equipment being ordered: Short Aircast Boot    Tendonitis, Left foot pain       terbinafine 250 MG tablet    lamISIL    45 tablet    Take 1 tablet (250 mg) by mouth daily    Onychomycosis

## 2018-07-05 ENCOUNTER — THERAPY VISIT (OUTPATIENT)
Dept: PHYSICAL THERAPY | Facility: CLINIC | Age: 48
End: 2018-07-05
Payer: COMMERCIAL

## 2018-07-05 DIAGNOSIS — M54.2 CERVICAL PAIN: ICD-10-CM

## 2018-07-05 DIAGNOSIS — M79.672 LEFT FOOT PAIN: ICD-10-CM

## 2018-07-05 PROCEDURE — 97164 PT RE-EVAL EST PLAN CARE: CPT | Mod: GP | Performed by: PHYSICAL THERAPIST

## 2018-07-05 PROCEDURE — 97110 THERAPEUTIC EXERCISES: CPT | Mod: GP | Performed by: PHYSICAL THERAPIST

## 2018-07-05 NOTE — PROGRESS NOTES
Subjective:  HPI                    Objective:        Flexibility/Screens:       Lower Extremity:  Decreased left lower extremity flexibility:Gastroc            Ankle/Foot Evaluation  ROM:    AROM:    Dorsiflexion:  Left:   12  Right:   20  Plantarflexion:  Left:  WNL pain at end range    Right:  WNL            Strength:                Anterior Tibialis:Left: 4+/5  Pain:    Posterior Tibialis: Left: 4+/5  Pain:    Peroneals: Left: 4+/5  Pain:    Extensor Digitorum: Left: 4+/5  Pain:  Gastroc/Soleus:Left: 4/5   Pain:        PALPATION:   Left ankle tenderness present at:  peroneals      MOBILITY TESTING: normal                                                                General      ROS    Assessment/Plan:    PROGRESS  REPORT    Progress reporting period is from 2/28/18 to 7/5/18.       SUBJECTIVE  Subjective changes noted by patient:  Patient reports that she has been still having left ankle pain (lateral ankle).  Previously had a left 2nd metatarsal stress fracture.  Having some pain bilaterally with wearing her shoes and orthotics.  Feeling pretty good foot wise when wearing sandals.  Lateral ankle pain worse with stretching and using the foot.   Current pain level is 3/10  .     Previous pain level was  5/10  .   Changes in function:  Yes (See Goal flowsheet attached for changes in current functional level)  Adverse reaction to treatment or activity: None    OBJECTIVE  Changes noted in objective findings:  Yes,         ASSESSMENT/PLAN  Updated problem list and treatment plan: Diagnosis 1:  Left lateral foot pain  Decreased ROM/flexibility - manual therapy, therapeutic exercise and home program  Decreased strength - therapeutic exercise, therapeutic activities and home program  Decreased function - therapeutic activities and home program  STG/LTGs have been met or progress has been made towards goals:  Yes (See Goal flow sheet completed today.)  Assessment of Progress: The patient's condition is improving.  Self  Management Plans:  Patient has been instructed in a home treatment program.  I have re-evaluated this patient and find that the nature, scope, duration and intensity of the therapy is appropriate for the medical condition of the patient.  Breonna continues to require the following intervention to meet STG and LTG's:  PT    Recommendations:  This patient would benefit from continued therapy.     Frequency:  1 X week, once daily  Duration:  for 4 weeks        Please refer to the daily flowsheet for treatment today, total treatment time and time spent performing 1:1 timed codes.

## 2018-07-05 NOTE — MR AVS SNAPSHOT
After Visit Summary   7/5/2018    Breonna Pinon    MRN: [id number]           Patient Information     Date Of Birth          1970        Visit Information        Provider Department      7/5/2018 4:00 PM Allen Briceno PT [facility]        Today's Diagnoses     Cervical pain        Left foot pain           Follow-ups after your visit        Your next 10 appointments already scheduled     Jul 13, 2018  8:50 AM CDT   [facility] with Allen Briceno PT   [facility] ([facility]  )    [address]  [address]  [facility] [address]   [phone]              Who to contact     If you have questions or need follow up information about today's clinic visit or your schedule please contact [facility] directly at [phone].  Normal or non-critical lab and imaging results will be communicated to you by MyChart, letter or phone within 4 business days after the clinic has received the results. If you do not hear from us within 7 days, please contact the clinic through [employer] or phone. If you have a critical or abnormal lab result, we will notify you by phone as soon as possible.  Submit refill requests through [employer] or call your pharmacy and they will forward the refill request to us. Please allow 3 business days for your refill to be completed.          Additional Information About Your Visit        MyChart Information     [employer] gives you secure access to your electronic health record. If you see a primary care provider, you can also send messages to your care team and make appointments. If you have questions, please call your primary care clinic.  If you do not have a primary care provider, please call [phone] and they will assist you.        Care EveryWhere ID     This is your Care EveryWhere ID. This could be used by other organizations to access your [place] medical records  [id number]         Blood  Pressure from Last 3 Encounters:   04/18/18 116/70   03/23/18 100/66   01/30/18 100/62    Weight from Last 3 Encounters:   06/20/18 68.5 kg (151 lb)   04/18/18 68.5 kg (151 lb)   03/23/18 68.8 kg (151 lb 9.6 oz)              We Performed the Following     [facility] PROGRESS NOTES REPORT     PT Re-Eval ([id number])     THERAPEUTIC EXERCISES        Primary Care Provider Office Phone # Fax #    Kaley Carolina [phone] [address] [place] [address]        Equal Access to Services     [facility]: Hadii aad ku hadasho Soomaali, waaxda luqadaha, qaybta kaalmada adeegyada, christie roblero hayaan ademaida ortega . [facility] [phone].    ATENCIÓN: Si habla español, tiene a frazier disposición servicios gratuitos de asistencia lingüística. Llame al [phone].    We comply with applicable federal civil rights laws and [place] laws. We do not discriminate on the basis of race, color, national origin, age, disability, sex, sexual orientation, or gender identity.            Thank you!     Thank you for choosing [facility]  for your care. Our goal is always to provide you with excellent care. Hearing back from our patients is one way we can continue to improve our services. Please take a few minutes to complete the written survey that you may receive in the mail after your visit with us. Thank you!             Your Updated Medication List - Protect others around you: Learn how to safely use, store and throw away your medicines at www.disposemymeds.org.          This list is accurate as of 7/5/18  5:11 PM.  Always use your most recent med list.                   Brand Name Dispense Instructions for use Diagnosis    cetirizine 10 MG tablet    zyrTEC    30 tablet    Take 1 tablet (10 mg) by mouth every evening        clobetasol 0.05 % ointment    TEMOVATE    30 g    Twice daily to rash area in genital area during flares    Lichen sclerosus et atrophicus        cyclobenzaprine 10 MG tablet    FLEXERIL    90 tablet    Take 1 tablet (10 mg) by mouth 3 times daily as needed for muscle spasms    Cervical pain       doxycycline 100 MG tablet    VIBRA-TABS    28 tablet    Take 1 tablet (100 mg) by mouth 2 times daily    Perioral dermatitis       econazole nitrate 1 % cream     30 g    Twice daily to foot rash until clear    Tinea pedis of both feet       lactobacillus acid-pectin Caps     100 capsule    Take 1 capsule by mouth daily.        naproxen 500 MG tablet    NAPROSYN    60 tablet    Take 1 tablet (500 mg) by mouth 2 times daily (with meals)    Cervical pain, Acute bilateral thoracic back pain, Left foot pain       norgestimate-ethinyl estradiol 0.25-35 MG-MCG per tablet    SPRINTEC 28    112 tablet    TAKE 1 TABLET BY MOUTH DAILY. TAKE ACTIVE TABLETS DAILY    Encounter for surveillance of contraceptive pills       order for DME     1 Device    Equipment being ordered: Short Aircast Boot    Tendonitis, Left foot pain       terbinafine 250 MG tablet    lamISIL    45 tablet    Take 1 tablet (250 mg) by mouth daily    Onychomycosis

## 2019-01-03 ENCOUNTER — MYC MEDICAL ADVICE (OUTPATIENT)
Dept: PEDIATRICS | Facility: CLINIC | Age: 49
End: 2019-01-03

## 2019-03-11 PROBLEM — M54.2 CERVICAL PAIN: Status: RESOLVED | Noted: 2018-02-07 | Resolved: 2019-03-11

## 2019-03-11 PROBLEM — M79.672 LEFT FOOT PAIN: Status: RESOLVED | Noted: 2018-02-07 | Resolved: 2019-03-11

## 2019-03-18 DIAGNOSIS — Z30.41 ENCOUNTER FOR SURVEILLANCE OF CONTRACEPTIVE PILLS: ICD-10-CM

## 2019-03-18 RX ORDER — NORGESTIMATE AND ETHINYL ESTRADIOL 0.25-0.035
KIT ORAL
Qty: 112 TABLET | Refills: 0 | Status: SHIPPED | OUTPATIENT
Start: 2019-03-18 | End: 2019-04-05

## 2019-04-02 ASSESSMENT — ENCOUNTER SYMPTOMS
CHILLS: 0
SHORTNESS OF BREATH: 0
HEMATURIA: 0
FREQUENCY: 1
ABDOMINAL PAIN: 0
DYSURIA: 0
COUGH: 0
HEADACHES: 0
EYE PAIN: 0
MYALGIAS: 0
BREAST MASS: 0
PALPITATIONS: 0
PARESTHESIAS: 0
FEVER: 0
WEAKNESS: 0
DIARRHEA: 0
NAUSEA: 0
ARTHRALGIAS: 0
SORE THROAT: 0
JOINT SWELLING: 0
HEARTBURN: 0
HEMATOCHEZIA: 0
NERVOUS/ANXIOUS: 0
CONSTIPATION: 0
DIZZINESS: 0

## 2019-04-05 ENCOUNTER — MYC MEDICAL ADVICE (OUTPATIENT)
Dept: PEDIATRICS | Facility: CLINIC | Age: 49
End: 2019-04-05

## 2019-04-05 ENCOUNTER — OFFICE VISIT (OUTPATIENT)
Dept: PEDIATRICS | Facility: CLINIC | Age: 49
End: 2019-04-05
Payer: COMMERCIAL

## 2019-04-05 VITALS
BODY MASS INDEX: 23.35 KG/M2 | TEMPERATURE: 97 F | OXYGEN SATURATION: 98 % | HEART RATE: 75 BPM | SYSTOLIC BLOOD PRESSURE: 100 MMHG | WEIGHT: 145.3 LBS | HEIGHT: 66 IN | DIASTOLIC BLOOD PRESSURE: 60 MMHG

## 2019-04-05 DIAGNOSIS — Z00.00 ENCOUNTER FOR ROUTINE HISTORY AND PHYSICAL EXAMINATION OF ADULT: Primary | ICD-10-CM

## 2019-04-05 DIAGNOSIS — L90.0 LICHEN SCLEROSUS ET ATROPHICUS: ICD-10-CM

## 2019-04-05 DIAGNOSIS — Z12.4 CERVICAL CANCER SCREENING: ICD-10-CM

## 2019-04-05 DIAGNOSIS — L71.0 PERIORAL DERMATITIS: ICD-10-CM

## 2019-04-05 DIAGNOSIS — Z30.41 ENCOUNTER FOR SURVEILLANCE OF CONTRACEPTIVE PILLS: ICD-10-CM

## 2019-04-05 PROBLEM — M99.06 SOMATIC DYSFUNCTION OF LOWER EXTREMITIES: Status: RESOLVED | Noted: 2018-03-01 | Resolved: 2019-04-05

## 2019-04-05 LAB
CHOLEST SERPL-MCNC: 148 MG/DL
GLUCOSE SERPL-MCNC: 82 MG/DL (ref 70–99)
HDLC SERPL-MCNC: 58 MG/DL
LDLC SERPL CALC-MCNC: 63 MG/DL
NONHDLC SERPL-MCNC: 90 MG/DL
TRIGL SERPL-MCNC: 133 MG/DL

## 2019-04-05 PROCEDURE — 99396 PREV VISIT EST AGE 40-64: CPT | Performed by: INTERNAL MEDICINE

## 2019-04-05 PROCEDURE — 87624 HPV HI-RISK TYP POOLED RSLT: CPT | Performed by: INTERNAL MEDICINE

## 2019-04-05 PROCEDURE — G0145 SCR C/V CYTO,THINLAYER,RESCR: HCPCS | Performed by: INTERNAL MEDICINE

## 2019-04-05 PROCEDURE — 80061 LIPID PANEL: CPT | Performed by: INTERNAL MEDICINE

## 2019-04-05 PROCEDURE — 36415 COLL VENOUS BLD VENIPUNCTURE: CPT | Performed by: INTERNAL MEDICINE

## 2019-04-05 PROCEDURE — 82947 ASSAY GLUCOSE BLOOD QUANT: CPT | Performed by: INTERNAL MEDICINE

## 2019-04-05 RX ORDER — NORGESTIMATE AND ETHINYL ESTRADIOL 0.25-0.035
KIT ORAL
Qty: 112 TABLET | Refills: 3 | Status: SHIPPED | OUTPATIENT
Start: 2019-04-05 | End: 2020-05-20

## 2019-04-05 RX ORDER — DOXYCYCLINE HYCLATE 100 MG
100 TABLET ORAL 2 TIMES DAILY PRN
Qty: 28 TABLET | Refills: 3 | Status: SHIPPED | OUTPATIENT
Start: 2019-04-05 | End: 2020-01-10

## 2019-04-05 RX ORDER — PIMECROLIMUS 10 MG/G
CREAM TOPICAL 2 TIMES DAILY PRN
Qty: 60 G | Refills: 3 | Status: SHIPPED | OUTPATIENT
Start: 2019-04-05 | End: 2019-04-08

## 2019-04-05 ASSESSMENT — ENCOUNTER SYMPTOMS
SORE THROAT: 0
NERVOUS/ANXIOUS: 0
EYE PAIN: 0
CONSTIPATION: 0
SHORTNESS OF BREATH: 0
JOINT SWELLING: 0
BREAST MASS: 0
FEVER: 0
HEMATURIA: 0
HEADACHES: 0
PARESTHESIAS: 0
DYSURIA: 0
CHILLS: 0
HEMATOCHEZIA: 0
NAUSEA: 0
ABDOMINAL PAIN: 0
ARTHRALGIAS: 0
WEAKNESS: 0
MYALGIAS: 0
PALPITATIONS: 0
DIARRHEA: 0
DIZZINESS: 0
HEARTBURN: 0
FREQUENCY: 1
COUGH: 0

## 2019-04-05 ASSESSMENT — MIFFLIN-ST. JEOR: SCORE: 1307.89

## 2019-04-05 NOTE — PROGRESS NOTES
SUBJECTIVE:   CC: Breonna Pinon is an 48 year old woman who presents for preventive health visit.     Physical   Annual:     Getting at least 3 servings of Calcium per day:  Yes    Bi-annual eye exam:  Yes    Dental care twice a year:  Yes    Sleep apnea or symptoms of sleep apnea:  None    Diet:  Gluten-free/reduced    Frequency of exercise:  6-7 days/week    Duration of exercise:  45-60 minutes    Taking medications regularly:  Yes    Medication side effects:  Other    Additional concerns today:  Yes    PHQ-2 Total Score: 0      Today's PHQ-2 Score:   PHQ-2 ( 1999 [employer]) 4/2/2019   Q1: Little interest or pleasure in doing things 0   Q2: Feeling down, depressed or hopeless 0   PHQ-2 Score 0   Q1: Little interest or pleasure in doing things Not at all   Q2: Feeling down, depressed or hopeless Not at all   PHQ-2 Score 0       Abuse: Current or Past(Physical, Sexual or Emotional)- No  Do you feel safe in your environment? Yes    Social History     Tobacco Use     Smoking status: Never Smoker     Smokeless tobacco: Never Used   Substance Use Topics     Alcohol use: Yes     Comment: rare     Alcohol Use 4/2/2019   If you drink alcohol do you typically have greater than 3 drinks per day OR greater than 7 drinks per week? No       Reviewed orders with patient.  Reviewed health maintenance and updated orders accordingly - Yes  Labs reviewed in [employer]    Mammogram Screening: Patient under age 50, mutual decision reflected in health maintenance.      Pertinent mammograms are reviewed under the imaging tab.  History of abnormal Pap smear: NO - age 30-65 PAP every 5 years with negative HPV co-testing recommended  PAP / HPV 8/21/2014 7/5/2013 7/11/2012   PAP NIL NIL NIL     Reviewed and updated as needed this visit by clinical staff  Tobacco  Allergies  Meds  Problems  Med Hx  Surg Hx  Fam Hx  Soc Hx          Reviewed and updated as needed this visit by Provider  Tobacco  Allergies  Meds  Problems  Med Hx  Surg  "Hx  Fam Hx            Review of Systems   Constitutional: Negative for chills and fever.   HENT: Negative for congestion, ear pain, hearing loss and sore throat.    Eyes: Negative for pain and visual disturbance.   Respiratory: Negative for cough and shortness of breath.    Cardiovascular: Negative for chest pain, palpitations and peripheral edema.   Gastrointestinal: Negative for abdominal pain, constipation, diarrhea, heartburn, hematochezia and nausea.   Breasts:  Negative for tenderness, breast mass and discharge.   Genitourinary: Positive for frequency. Negative for dysuria, genital sores, hematuria, pelvic pain, urgency, vaginal bleeding and vaginal discharge.   Musculoskeletal: Negative for arthralgias, joint swelling and myalgias.   Skin: Positive for rash.   Neurological: Negative for dizziness, weakness, headaches and paresthesias.   Psychiatric/Behavioral: Negative for mood changes. The patient is not nervous/anxious.           OBJECTIVE:   /60 (BP Location: Right arm, Patient Position: Chair, Cuff Size: Adult Regular)   Pulse 75   Temp 97  F (36.1  C) (Tympanic)   Ht 1.664 m (5' 5.5\")   Wt 65.9 kg (145 lb 4.8 oz)   SpO2 98%   BMI 23.81 kg/m    Physical Exam   Constitutional: She is oriented to person, place, and time. She appears well-developed and well-nourished. No distress.   HENT:   Right Ear: Tympanic membrane and external ear normal.   Left Ear: Tympanic membrane and external ear normal.   Nose: Nose normal.   Mouth/Throat: Oropharynx is clear and moist. No oropharyngeal exudate.   Eyes: Conjunctivae are normal. Pupils are equal, round, and reactive to light. Right eye exhibits no discharge. Left eye exhibits no discharge.   Neck: Neck supple. No tracheal deviation present. No thyromegaly present.   Cardiovascular: Normal rate, regular rhythm, S1 normal, S2 normal, normal heart sounds and normal pulses. Exam reveals no S3, no S4 and no friction rub.   No murmur " heard.  Pulmonary/Chest: Effort normal and breath sounds normal. No respiratory distress. She has no wheezes. She has no rales.   Abdominal: Soft. Bowel sounds are normal. She exhibits no mass. There is no hepatosplenomegaly. There is no tenderness.   Genitourinary: Vagina normal. There is rash on the right labia. There is rash on the left labia. Cervix exhibits no motion tenderness and no discharge. No vaginal discharge found.   Genitourinary Comments: Mild atrophic changes labia with fusing labia minora to majora and atrophic changes of clitoral mendes.  No active lichen sclerosis noted.   Musculoskeletal: Normal range of motion. She exhibits no edema.   Lymphadenopathy:     She has no cervical adenopathy.   Neurological: She is alert and oriented to person, place, and time. She has normal strength and normal reflexes. She exhibits normal muscle tone.   Skin: Skin is warm and dry. No rash noted.   Psychiatric: She has a normal mood and affect. Judgment and thought content normal. Cognition and memory are normal.         ASSESSMENT/PLAN:   1. Encounter for routine history and physical examination of adult  Routine health education discussed: calcium, diet, exercise, weight, safety.   - Glucose  - Lipid panel reflex to direct LDL Fasting    2. Perioral dermatitis  Refill for as needed use   - doxycycline hyclate (VIBRA-TABS) 100 MG tablet; Take 1 tablet (100 mg) by mouth 2 times daily as needed (rash)  Dispense: 28 tablet; Refill: 3    3. Cervical cancer screening    - HPV High Risk Types DNA Cervical  - Pap imaged thin layer screen reflex to HPV if ASCUS - recommend age 25 - 29    4. Encounter for surveillance of contraceptive pills  refill  - norgestimate-ethinyl estradiol (SPRINTEC 28) 0.25-35 MG-MCG tablet; TAKE 1 TABLET BY MOUTH DAILY. TAKE ACTIVE TABLETS DAILY  Dispense: 112 tablet; Refill: 3    5. Lichen sclerosus et atrophicus  Discussed cancer risk. Reviewed uptodate.  Try prescription per orders as has not  "had before  - pimecrolimus (ELIDEL) 1 % external cream; Apply topically 2 times daily as needed (rash)  Dispense: 60 g; Refill: 3    COUNSELING:  Reviewed preventive health counseling, as reflected in patient instructions    BP Readings from Last 1 Encounters:   04/05/19 100/60     Estimated body mass index is 23.81 kg/m  as calculated from the following:    Height as of this encounter: 1.664 m (5' 5.5\").    Weight as of this encounter: 65.9 kg (145 lb 4.8 oz).       reports that  has never smoked. she has never used smokeless tobacco.      Counseling Resources:  ATP IV Guidelines  Pooled Cohorts Equation Calculator  Breast Cancer Risk Calculator  FRAX Risk Assessment  ICSI Preventive Guidelines  Dietary Guidelines for Americans, 2010  USDA's MyPlate  ASA Prophylaxis  Lung CA Screening    Kaley Carolina MD  [facility]  "

## 2019-04-05 NOTE — PATIENT INSTRUCTIONS
Preventive Health Recommendations  Female Ages 40 to 49    Yearly exam:     See your health care provider every year in order to  1. Review health changes.   2. Discuss preventive care.    3. Review your medicines if your doctor prescribed any.      Get a Pap test every three years (unless you have an abnormal result and your provider advises testing more often).      If you get Pap tests with HPV test, you only need to test every 5 years, unless you have an abnormal result. You do not need a Pap test if your uterus was removed (hysterectomy) and you have not had cancer.      You should be tested each year for STDs (sexually transmitted diseases), if you're at risk.     Ask your doctor if you should have a mammogram.      Have a colonoscopy (test for colon cancer) if someone in your family has had colon cancer or polyps before age 50.       Have a cholesterol test every 5 years.       Have a diabetes test (fasting glucose) after age 45. If you are at risk for diabetes, you should have this test every 3 years.    Shots: Get a flu shot each year. Get a tetanus shot every 10 years - you are due next year.     Nutrition:     Eat at least 5 servings of fruits and vegetables each day.    Eat whole-grain bread, whole-wheat pasta and brown rice instead of white grains and rice.    Get adequate Calcium and Vitamin D.      Lifestyle    Exercise at least 150 minutes a week (an average of 30 minutes a day, 5 days a week). This will help you control your weight and prevent disease.    Limit alcohol to one drink per day.    No smoking.     Wear sunscreen to prevent skin cancer.    See your dentist every six months for an exam and cleaning.

## 2019-04-08 NOTE — TELEPHONE ENCOUNTER
Pt sent a [employer] message with an update on a medication that was prescribed.  See [employer] message.    Becky Malagon RN

## 2019-04-09 LAB
COPATH REPORT: NORMAL
PAP: NORMAL

## 2019-04-11 LAB
FINAL DIAGNOSIS: NORMAL
HPV HR 12 DNA CVX QL NAA+PROBE: NEGATIVE
HPV16 DNA SPEC QL NAA+PROBE: NEGATIVE
HPV18 DNA SPEC QL NAA+PROBE: NEGATIVE
SPECIMEN DESCRIPTION: NORMAL
SPECIMEN SOURCE CVX/VAG CYTO: NORMAL

## 2019-05-03 ENCOUNTER — MYC MEDICAL ADVICE (OUTPATIENT)
Dept: PEDIATRICS | Facility: CLINIC | Age: 49
End: 2019-05-03

## 2019-05-03 ENCOUNTER — TRANSFERRED RECORDS (OUTPATIENT)
Dept: HEALTH INFORMATION MANAGEMENT | Facility: CLINIC | Age: 49
End: 2019-05-03

## 2019-05-06 ENCOUNTER — E-VISIT (OUTPATIENT)
Dept: PEDIATRICS | Facility: CLINIC | Age: 49
End: 2019-05-06
Payer: COMMERCIAL

## 2019-05-06 DIAGNOSIS — Z23 ENCOUNTER FOR IMMUNIZATION: Primary | ICD-10-CM

## 2019-05-06 PROCEDURE — 99444 ZZC PHYSICIAN ONLINE EVALUATION & MANAGEMENT SERVICE: CPT | Performed by: INTERNAL MEDICINE

## 2019-05-07 DIAGNOSIS — Z23 ENCOUNTER FOR IMMUNIZATION: ICD-10-CM

## 2019-05-07 PROCEDURE — 86765 RUBEOLA ANTIBODY: CPT | Performed by: NURSE PRACTITIONER

## 2019-05-07 PROCEDURE — 36415 COLL VENOUS BLD VENIPUNCTURE: CPT | Performed by: NURSE PRACTITIONER

## 2019-05-10 LAB — MEV IGG SER QL IA: 1.1 AI (ref 0–0.8)

## 2019-08-20 ENCOUNTER — ANCILLARY PROCEDURE (OUTPATIENT)
Dept: MAMMOGRAPHY | Facility: CLINIC | Age: 49
End: 2019-08-20
Payer: COMMERCIAL

## 2019-08-20 DIAGNOSIS — Z12.31 VISIT FOR SCREENING MAMMOGRAM: ICD-10-CM

## 2019-08-20 PROCEDURE — 77067 SCR MAMMO BI INCL CAD: CPT | Mod: TC

## 2019-08-20 PROCEDURE — 77063 BREAST TOMOSYNTHESIS BI: CPT | Mod: TC

## 2019-11-14 ENCOUNTER — TRANSFERRED RECORDS (OUTPATIENT)
Dept: HEALTH INFORMATION MANAGEMENT | Facility: CLINIC | Age: 49
End: 2019-11-14

## 2019-11-14 LAB
ALT SERPL-CCNC: 11 U/L (ref 0–45)
AST SERPL-CCNC: 15 U/L (ref 0–33)
CHOLEST SERPL-MCNC: 150 MG/DL (ref 140–199)
CREAT SERPL-MCNC: 0.64 MG/DL (ref 0.6–1.3)
GLUCOSE SERPL-MCNC: 103 MG/DL (ref 60–99)
HBA1C MFR BLD: 5.2 % (ref 3–5.6)
HDLC SERPL-MCNC: 62.6 MG/DL (ref 50–100)
LDLC SERPL CALC-MCNC: 58 MG/DL (ref 0–129)
TRIGL SERPL-MCNC: 147 MG/DL (ref 0–200)

## 2020-01-10 ENCOUNTER — MYC REFILL (OUTPATIENT)
Dept: PEDIATRICS | Facility: CLINIC | Age: 50
End: 2020-01-10

## 2020-01-10 DIAGNOSIS — L71.0 PERIORAL DERMATITIS: ICD-10-CM

## 2020-01-10 RX ORDER — DOXYCYCLINE HYCLATE 100 MG
100 TABLET ORAL 2 TIMES DAILY PRN
Qty: 28 TABLET | Refills: 3 | Status: CANCELLED | OUTPATIENT
Start: 2020-01-10

## 2020-01-14 RX ORDER — DOXYCYCLINE HYCLATE 100 MG
100 TABLET ORAL 2 TIMES DAILY PRN
Qty: 28 TABLET | Refills: 3 | Status: SHIPPED | OUTPATIENT
Start: 2020-01-14 | End: 2020-05-20

## 2020-01-14 NOTE — TELEPHONE ENCOUNTER
Requested Prescriptions   Pending Prescriptions Disp Refills     doxycycline hyclate (VIBRA-TABS) 100 MG tablet 28 tablet 3     Sig: Take 1 tablet (100 mg) by mouth 2 times daily as needed (rash)       There is no refill protocol information for this order              Last Written Prescription Date:  4/5/19  Last Fill Quantity: 28,   # refills: 3  Last Office Visit: 4/5/19  Future Office visit:       Routing refill request to provider for review/approval because:  Drug not on the [facility] or [facility] refill protocol or controlled substance    2. Perioral dermatitis  Refill for as needed use   - doxycycline hyclate (VIBRA-TABS) 100 MG tablet; Take 1 tablet (100 mg) by mouth 2 times daily as needed (rash)  Dispense: 28 tablet; Refill: 3

## 2020-05-20 ENCOUNTER — MYC REFILL (OUTPATIENT)
Dept: PEDIATRICS | Facility: CLINIC | Age: 50
End: 2020-05-20

## 2020-05-20 DIAGNOSIS — Z30.41 ENCOUNTER FOR SURVEILLANCE OF CONTRACEPTIVE PILLS: ICD-10-CM

## 2020-05-20 DIAGNOSIS — L71.0 PERIORAL DERMATITIS: ICD-10-CM

## 2020-05-21 RX ORDER — DOXYCYCLINE HYCLATE 100 MG
100 TABLET ORAL 2 TIMES DAILY PRN
Qty: 28 TABLET | Refills: 3 | Status: SHIPPED | OUTPATIENT
Start: 2020-05-21 | End: 2021-05-05

## 2020-05-21 RX ORDER — NORGESTIMATE AND ETHINYL ESTRADIOL 0.25-0.035
KIT ORAL
Qty: 112 TABLET | Refills: 3 | Status: SHIPPED | OUTPATIENT
Start: 2020-05-21 | End: 2020-07-15

## 2020-05-21 NOTE — TELEPHONE ENCOUNTER
Routing refill request to provider for review/approval because:  Drug not on the [facility] refill protocol   Patient needs to be seen because it has been more than 1 year since last office visit.    Katlin Becerra RN on 5/21/2020 at 10:57 AM

## 2020-06-25 ENCOUNTER — TELEPHONE (OUTPATIENT)
Dept: PEDIATRICS | Facility: CLINIC | Age: 50
End: 2020-06-25

## 2020-06-25 DIAGNOSIS — Z13.220 SCREENING CHOLESTEROL LEVEL: Primary | ICD-10-CM

## 2020-06-25 DIAGNOSIS — Z00.00 ROUTINE GENERAL MEDICAL EXAMINATION AT A HEALTH CARE FACILITY: ICD-10-CM

## 2020-06-25 DIAGNOSIS — Z13.1 SCREENING FOR DIABETES MELLITUS: ICD-10-CM

## 2020-06-25 NOTE — TELEPHONE ENCOUNTER
Order/Referral Request:  Who is requesting: pt was called to change time  Orders being requested: labs for biometric screening and phy  Reason service is needed/diagnosis: biometric screening and phy  When are orders needed by: July 15th for phy  Has this been discussed with Provider: No  Does patient have a preference on a Group/Provider/Facility? computer  Does patient have an appointment scheduled: Yes:   Where to send Orders: computer  Okay to leave detailed message?  Yes at Home number on file [phone] (home)    Routing

## 2020-06-25 NOTE — TELEPHONE ENCOUNTER
Provider: patient scheduled for physical on 7/15/20 and requesting lab appointment prior to visit. Patient requesting biometric and physical labs. Please review and order labs.   Thanks!  Lala WELLS RN, BSN

## 2020-06-25 NOTE — TELEPHONE ENCOUNTER
Each biometric is a bit different. Please have her check her form.     I ordered cholesterol, CMP, and A1c.     Please assist in scheduling fasting lab only a week prior.    Thanks!  MICHAEL Reinoso MD  Internal Medicine-Pediatrics

## 2020-06-25 NOTE — TELEPHONE ENCOUNTER
Pt's labs were rescheduled to 7/13, they will be out of town before this, and unable to come in until then.     Danika Domínguez on 6/25/2020 at 2:56 PM

## 2020-07-13 DIAGNOSIS — Z00.00 ROUTINE GENERAL MEDICAL EXAMINATION AT A HEALTH CARE FACILITY: ICD-10-CM

## 2020-07-13 DIAGNOSIS — Z13.220 SCREENING CHOLESTEROL LEVEL: ICD-10-CM

## 2020-07-13 DIAGNOSIS — Z13.1 SCREENING FOR DIABETES MELLITUS: ICD-10-CM

## 2020-07-13 LAB — HBA1C MFR BLD: 5.1 % (ref 0–5.6)

## 2020-07-13 PROCEDURE — 83036 HEMOGLOBIN GLYCOSYLATED A1C: CPT | Performed by: INTERNAL MEDICINE

## 2020-07-13 PROCEDURE — 80061 LIPID PANEL: CPT | Performed by: INTERNAL MEDICINE

## 2020-07-13 PROCEDURE — 36415 COLL VENOUS BLD VENIPUNCTURE: CPT | Performed by: INTERNAL MEDICINE

## 2020-07-13 PROCEDURE — 80053 COMPREHEN METABOLIC PANEL: CPT | Performed by: INTERNAL MEDICINE

## 2020-07-14 LAB
ALBUMIN SERPL-MCNC: 3.6 G/DL (ref 3.4–5)
ALP SERPL-CCNC: 56 U/L (ref 40–150)
ALT SERPL W P-5'-P-CCNC: 14 U/L (ref 0–50)
ANION GAP SERPL CALCULATED.3IONS-SCNC: 10 MMOL/L (ref 3–14)
AST SERPL W P-5'-P-CCNC: 13 U/L (ref 0–45)
BILIRUB SERPL-MCNC: 0.3 MG/DL (ref 0.2–1.3)
BUN SERPL-MCNC: 16 MG/DL (ref 7–30)
CALCIUM SERPL-MCNC: 8.6 MG/DL (ref 8.5–10.1)
CHLORIDE SERPL-SCNC: 108 MMOL/L (ref 94–109)
CHOLEST SERPL-MCNC: 149 MG/DL
CO2 SERPL-SCNC: 23 MMOL/L (ref 20–32)
CREAT SERPL-MCNC: 0.64 MG/DL (ref 0.52–1.04)
GFR SERPL CREATININE-BSD FRML MDRD: >90 ML/MIN/{1.73_M2}
GLUCOSE SERPL-MCNC: 84 MG/DL (ref 70–99)
HDLC SERPL-MCNC: 76 MG/DL
LDLC SERPL CALC-MCNC: 55 MG/DL
NONHDLC SERPL-MCNC: 73 MG/DL
POTASSIUM SERPL-SCNC: 3.7 MMOL/L (ref 3.4–5.3)
PROT SERPL-MCNC: 7.1 G/DL (ref 6.8–8.8)
SODIUM SERPL-SCNC: 141 MMOL/L (ref 133–144)
TRIGL SERPL-MCNC: 88 MG/DL

## 2020-07-14 ASSESSMENT — ENCOUNTER SYMPTOMS
PALPITATIONS: 0
DYSURIA: 0
HEMATOCHEZIA: 0
NAUSEA: 0
DIZZINESS: 0
MYALGIAS: 0
HEMATURIA: 0
DIARRHEA: 0
COUGH: 0
JOINT SWELLING: 0
SHORTNESS OF BREATH: 0
ARTHRALGIAS: 0
FEVER: 0
EYE PAIN: 0
CHILLS: 0
PARESTHESIAS: 0
NERVOUS/ANXIOUS: 0
CONSTIPATION: 0
FREQUENCY: 0
BREAST MASS: 0
HEARTBURN: 0
WEAKNESS: 0
HEADACHES: 0
ABDOMINAL PAIN: 0
SORE THROAT: 0

## 2020-07-15 ENCOUNTER — OFFICE VISIT (OUTPATIENT)
Dept: PEDIATRICS | Facility: CLINIC | Age: 50
End: 2020-07-15
Payer: COMMERCIAL

## 2020-07-15 VITALS
BODY MASS INDEX: 23.32 KG/M2 | HEART RATE: 76 BPM | TEMPERATURE: 97.6 F | WEIGHT: 140 LBS | OXYGEN SATURATION: 99 % | RESPIRATION RATE: 18 BRPM | HEIGHT: 65 IN | SYSTOLIC BLOOD PRESSURE: 110 MMHG | DIASTOLIC BLOOD PRESSURE: 80 MMHG

## 2020-07-15 DIAGNOSIS — L90.0 LICHEN SCLEROSUS ET ATROPHICUS: ICD-10-CM

## 2020-07-15 DIAGNOSIS — Z86.018 H/O DYSPLASTIC NEVUS: ICD-10-CM

## 2020-07-15 DIAGNOSIS — Z30.41 ENCOUNTER FOR SURVEILLANCE OF CONTRACEPTIVE PILLS: ICD-10-CM

## 2020-07-15 DIAGNOSIS — Z00.00 ENCOUNTER FOR ROUTINE ADULT HEALTH EXAMINATION WITHOUT ABNORMAL FINDINGS: Primary | ICD-10-CM

## 2020-07-15 DIAGNOSIS — Z12.31 ENCOUNTER FOR SCREENING MAMMOGRAM FOR BREAST CANCER: ICD-10-CM

## 2020-07-15 PROCEDURE — 99396 PREV VISIT EST AGE 40-64: CPT | Mod: GC | Performed by: STUDENT IN AN ORGANIZED HEALTH CARE EDUCATION/TRAINING PROGRAM

## 2020-07-15 RX ORDER — NORGESTIMATE AND ETHINYL ESTRADIOL 0.25-0.035
KIT ORAL
Qty: 112 TABLET | Refills: 0 | Status: SHIPPED | OUTPATIENT
Start: 2020-07-15 | End: 2020-11-03

## 2020-07-15 RX ORDER — CHOLECALCIFEROL (VITAMIN D3) 125 MCG
3000 CAPSULE ORAL
COMMUNITY

## 2020-07-15 ASSESSMENT — MIFFLIN-ST. JEOR: SCORE: 1270.92

## 2020-07-15 NOTE — PROGRESS NOTES
SUBJECTIVE:   CC: Breonna Pinon is an 49 year old woman who presents for preventive health visit.     Down 5 lbs from a year ago. Feels this weight loss is mostly intentional - some anxiety/stress from being home with her 8 year old this spring due to COVID-19 pandemic while also working full-time, increased exercise this spring as she tried to get 10,000 steps daily with her son.    Has mole on her left lower leg that has been present for some time. Has been seen by Dr. Restrepo in the past for this - she recalls that Dr. Restrepo was not worried about it, did not feel it needed to be biopsied or excised. She doesn't think it's getting bigger. Not getting darker. It is not painful or itchy. She has another darker-than-others mole on her chest that she would like checked out.    Saw OB for lichen sclerosus earlier this year - had fractional laser and platelet-rich plasma. Thinks these therapies have been helpful, especially w/ adhesions and itching. She has been able to stop topical steroids.    Healthy Habits:     Getting at least 3 servings of Calcium per day:  Yes    Bi-annual eye exam:  Yes    Dental care twice a year:  Yes    Sleep apnea or symptoms of sleep apnea:  None    Diet:  Gluten-free/reduced    Frequency of exercise:  6-7 days/week    Duration of exercise:  45-60 minutes    Taking medications regularly:  Yes    Medication side effects:  None    PHQ-2 Total Score: 0    Additional concerns today:  Yes    Today's PHQ-2 Score:   PHQ-2 ( 1999 Pfizer) 7/14/2020   Q1: Little interest or pleasure in doing things 0   Q2: Feeling down, depressed or hopeless 0   PHQ-2 Score 0   Q1: Little interest or pleasure in doing things Not at all   Q2: Feeling down, depressed or hopeless Not at all   PHQ-2 Score 0     Abuse: Current or Past(Physical, Sexual or Emotional)- No  Do you feel safe in your environment? Yes    Social History     Tobacco Use     Smoking status: Never Smoker     Smokeless tobacco: Never Used  "  Substance Use Topics     Alcohol use: Yes     Comment: rare     Alcohol Use 7/14/2020   Prescreen: >3 drinks/day or >7 drinks/week? No   Prescreen: >3 drinks/day or >7 drinks/week? -     Reviewed orders with patient.  Reviewed health maintenance and updated orders accordingly - Yes  Labs reviewed in EPIC    Mammogram Screening: Patient under age 50, mutual decision reflected in health maintenance.  Had normal mammogram 8/2019.     Pertinent mammograms are reviewed under the imaging tab.  History of abnormal Pap smear: NO - age 30-65 PAP every 5 years with negative HPV co-testing recommended. Last Pap was one year ago, normal.  PAP / HPV Latest Ref Rng & Units 4/5/2019 8/21/2014 7/5/2013   PAP - NIL NIL NIL   HPV 16 DNA NEG:Negative Negative - -   HPV 18 DNA NEG:Negative Negative - -   OTHER HR HPV NEG:Negative Negative - -     Reviewed and updated as needed this visit by clinical staff  Tobacco  Allergies  Meds  Med Hx  Surg Hx  Fam Hx  Soc Hx        Reviewed and updated as needed this visit by Provider          Review of Systems  CONSTITUTIONAL: NEGATIVE for fever, chills, change in weight  INTEGUMENTARU/SKIN: POSITIVE for moles  EYES: NEGATIVE for vision changes or irritation  ENT: NEGATIVE for ear, mouth and throat problems  RESP: NEGATIVE for significant cough or SOB  BREAST: NEGATIVE for masses, tenderness or discharge  CV: NEGATIVE for chest pain, palpitations or peripheral edema  GI: NEGATIVE for nausea, abdominal pain, heartburn, or change in bowel habits  : NEGATIVE for unusual urinary or vaginal symptoms. Periods are regular.  MUSCULOSKELETAL: NEGATIVE for significant arthralgias or myalgia  NEURO: NEGATIVE for weakness, dizziness or paresthesias  PSYCHIATRIC: NEGATIVE for changes in mood or affect     OBJECTIVE:   /80 (BP Location: Right arm, Patient Position: Sitting, Cuff Size: Adult Regular)   Pulse 76   Temp 97.6  F (36.4  C) (Tympanic)   Resp 18   Ht 1.651 m (5' 5\")   Wt 63.5 kg " (140 lb)   SpO2 99%   BMI 23.30 kg/m    Physical Exam  GENERAL: healthy, alert and no distress  NECK: no adenopathy, no asymmetry, masses, or scars and thyroid normal to palpation  RESP: lungs clear to auscultation - no rales, rhonchi or wheezes  CV: regular rate and rhythm, normal S1 S2, no S3 or S4, no murmur, click or rub, no peripheral edema and peripheral pulses strong  ABDOMEN: soft, nontender, no hepatosplenomegaly, no masses and bowel sounds normal  MS: no gross musculoskeletal defects noted, no edema  SKIN: several round brown macules on examined skin, notably on lateral left lower leg and several on chest; none with irregular borders or irregular pigmentation    Diagnostic Test Results:  Labs reviewed in Epic    ASSESSMENT/PLAN:   1. Encounter for routine adult health examination without abnormal findings  - Up to date on immunizations, cancer screenings (last Pap 2019, last mammogram 2019)  - Overall doing really well; labs done earlier week for work-related biometric screening all normal. A1c well within normal range, HDL increased/triglycerides decreased since last check ~8 months ago. Should continue exercise/healthy diet as she is.  - RTC in 1 year for next annual exam    2. Encounter for screening mammogram for breast cancer  Discussed screening annually vs every 2 years. She prefers annual screening for now; will be eligible for breast cancer screening after next month. Order placed today.  - *MA Screening Digital Bilateral; Future    3. Encounter for surveillance of contraceptive pills  Takes OCP continuously for menstrual suppression - added additional refill to current Rx today so should be filled through 7/2021.  - norgestimate-ethinyl estradiol (SPRINTEC 28) 0.25-35 MG-MCG tablet; TAKE 1 TABLET BY MOUTH DAILY. TAKE ACTIVE TABLETS DAILY  Dispense: 112 tablet; Refill: 0    4. Lichen sclerosus et atrophicus  Sees Ob/Gyn for this - overall doing well, no longer needing topical steroids.    5. H/O  "dysplastic nevus  Incomplete skin exam (chest, legs) today w/o concerning nevi. Should follow up with derm as able for complete skin exam.      COUNSELING:  Reviewed preventive health counseling, as reflected in patient instructions       Regular exercise       Healthy diet/nutrition       Contraception    Estimated body mass index is 23.3 kg/m  as calculated from the following:    Height as of this encounter: 1.651 m (5' 5\").    Weight as of this encounter: 63.5 kg (140 lb).     reports that she has never smoked. She has never used smokeless tobacco.    Counseling Resources:  ATP IV Guidelines  Pooled Cohorts Equation Calculator  Breast Cancer Risk Calculator  FRAX Risk Assessment  ICSI Preventive Guidelines  Dietary Guidelines for Americans, 2010  [employer]'s MyPlate  ASA Prophylaxis  Lung CA Screening    Carol Ann Holden MD  [facility]    -----------    I personally saw this patient and discussed this case in depth with Dr. Holden. I reviewed and agree with the key components of the history, physical, assessment, and plan.       MICHAEL Reinoso MD  Internal Medicine-Pediatrics  "

## 2020-07-15 NOTE — PATIENT INSTRUCTIONS
It was great to meet you today! I put in the order for your mammogram - you can schedule this any time after next month.     Let us know if you need anything before your next annual visit one year from now!

## 2020-08-25 ENCOUNTER — ANCILLARY PROCEDURE (OUTPATIENT)
Dept: MAMMOGRAPHY | Facility: CLINIC | Age: 50
End: 2020-08-25
Payer: COMMERCIAL

## 2020-08-25 DIAGNOSIS — Z12.31 ENCOUNTER FOR SCREENING MAMMOGRAM FOR BREAST CANCER: ICD-10-CM

## 2020-08-25 PROCEDURE — 77067 SCR MAMMO BI INCL CAD: CPT | Mod: TC

## 2020-10-02 ENCOUNTER — TRANSFERRED RECORDS (OUTPATIENT)
Dept: HEALTH INFORMATION MANAGEMENT | Facility: CLINIC | Age: 50
End: 2020-10-02

## 2020-11-29 ENCOUNTER — HEALTH MAINTENANCE LETTER (OUTPATIENT)
Age: 50
End: 2020-11-29

## 2021-05-05 ENCOUNTER — OFFICE VISIT (OUTPATIENT)
Dept: PEDIATRICS | Facility: CLINIC | Age: 51
End: 2021-05-05
Payer: COMMERCIAL

## 2021-05-05 VITALS
BODY MASS INDEX: 22.19 KG/M2 | OXYGEN SATURATION: 97 % | DIASTOLIC BLOOD PRESSURE: 64 MMHG | TEMPERATURE: 97.7 F | HEIGHT: 66 IN | SYSTOLIC BLOOD PRESSURE: 110 MMHG | WEIGHT: 138.1 LBS | HEART RATE: 92 BPM

## 2021-05-05 DIAGNOSIS — Z30.41 ENCOUNTER FOR SURVEILLANCE OF CONTRACEPTIVE PILLS: ICD-10-CM

## 2021-05-05 DIAGNOSIS — Z11.59 NEED FOR HEPATITIS C SCREENING TEST: ICD-10-CM

## 2021-05-05 DIAGNOSIS — Z12.31 ENCOUNTER FOR SCREENING MAMMOGRAM FOR BREAST CANCER: ICD-10-CM

## 2021-05-05 DIAGNOSIS — Z00.00 ROUTINE GENERAL MEDICAL EXAMINATION AT A HEALTH CARE FACILITY: Primary | ICD-10-CM

## 2021-05-05 PROCEDURE — 99396 PREV VISIT EST AGE 40-64: CPT | Mod: GC | Performed by: STUDENT IN AN ORGANIZED HEALTH CARE EDUCATION/TRAINING PROGRAM

## 2021-05-05 RX ORDER — NORGESTIMATE AND ETHINYL ESTRADIOL 0.25-0.035
KIT ORAL
Qty: 112 TABLET | Refills: 2 | Status: SHIPPED | OUTPATIENT
Start: 2021-11-01 | End: 2022-03-22

## 2021-05-05 ASSESSMENT — ENCOUNTER SYMPTOMS
FREQUENCY: 0
SORE THROAT: 0
ARTHRALGIAS: 0
BREAST MASS: 0
HEARTBURN: 0
FEVER: 0
JOINT SWELLING: 0
CONSTIPATION: 0
COUGH: 0
DIZZINESS: 0
HEADACHES: 0
PALPITATIONS: 0
WEAKNESS: 0
NAUSEA: 0
NERVOUS/ANXIOUS: 0
SHORTNESS OF BREATH: 0
HEMATOCHEZIA: 0
PARESTHESIAS: 0
DIARRHEA: 0
EYE PAIN: 0
MYALGIAS: 0
DYSURIA: 0
ABDOMINAL PAIN: 0
HEMATURIA: 0
CHILLS: 0

## 2021-05-05 ASSESSMENT — MIFFLIN-ST. JEOR: SCORE: 1267.92

## 2021-05-05 NOTE — PATIENT INSTRUCTIONS
So nice to meet you!  We will fax the [employer] form when lab results come in and mail you the original copy.    Issa to schedule mammogram. General clinic number.      Preventive Health Recommendations  Female Ages 40 to 49    Yearly exam:     See your health care provider every year in order to  1. Review health changes.   2. Discuss preventive care.    3. Review your medicines if your doctor prescribed any.      Get a Pap test every three years (unless you have an abnormal result and your provider advises testing more often).      If you get Pap tests with HPV test, you only need to test every 5 years, unless you have an abnormal result. You do not need a Pap test if your uterus was removed (hysterectomy) and you have not had cancer.      You should be tested each year for STDs (sexually transmitted diseases), if you're at risk.     Ask your doctor if you should have a mammogram.      Have a colonoscopy (test for colon cancer) if someone in your family has had colon cancer or polyps before age 50.       Have a cholesterol test every 5 years.       Have a diabetes test (fasting glucose) after age 45. If you are at risk for diabetes, you should have this test every 3 years.    Shots: Get a flu shot each year. Get a tetanus shot every 10 years.     Nutrition:     Eat at least 5 servings of fruits and vegetables each day.    Eat whole-grain bread, whole-wheat pasta and brown rice instead of white grains and rice.    Get adequate Calcium and Vitamin D.      Lifestyle    Exercise at least 150 minutes a week (an average of 30 minutes a day, 5 days a week). This will help you control your weight and prevent disease.    Limit alcohol to one drink per day.    No smoking.     Wear sunscreen to prevent skin cancer.    See your dentist every six months for an exam and cleaning.

## 2021-05-05 NOTE — PROGRESS NOTES
SUBJECTIVE:   CC: Breonna Pinon is an 50 year old woman who presents for preventive health visit.     Patient has been advised of split billing requirements and indicates understanding: Yes  Healthy Habits:     Getting at least 3 servings of Calcium per day:  Yes    Bi-annual eye exam:  Yes    Dental care twice a year:  Yes    Sleep apnea or symptoms of sleep apnea:  None    Diet:  Gluten-free/reduced    Frequency of exercise:  6-7 days/week    Duration of exercise:  Greater than 60 minutes    Taking medications regularly:  Yes    Medication side effects:  None    PHQ-2 Total Score: 0    Additional concerns today:  No    Mammogram please order 3D  -dense breast tissue  -no lumps or concerns    Allergies  -uses zyrtec PRN  -benadryl at night if needed    Today's PHQ-2 Score:   PHQ-2 ( 1999 Pfizer) 5/5/2021   Q1: Little interest or pleasure in doing things 0   Q2: Feeling down, depressed or hopeless 0   PHQ-2 Score 0   Q1: Little interest or pleasure in doing things Not at all   Q2: Feeling down, depressed or hopeless Not at all   PHQ-2 Score 0     Abuse: Current or Past (Physical, Sexual or Emotional) - No  Do you feel safe in your environment? Yes    Have you ever done Advance Care Planning? (For example, a Health Directive, POLST, or a discussion with a medical provider or your loved ones about your wishes): Yes, patient states has an Advance Care Planning document and will bring a copy to the clinic.     Wt Readings from Last 4 Encounters:   05/05/21 62.6 kg (138 lb 1.6 oz)   07/15/20 63.5 kg (140 lb)   04/05/19 65.9 kg (145 lb 4.8 oz)   06/20/18 68.5 kg (151 lb)       Social History     Tobacco Use     Smoking status: Never Smoker     Smokeless tobacco: Never Used   Substance Use Topics     Alcohol use: Yes     Comment: rare     If you drink alcohol do you typically have >3 drinks per day or >7 drinks per week? No    Alcohol Use 5/5/2021   Prescreen: >3 drinks/day or >7 drinks/week? No   Prescreen: >3  drinks/day or >7 drinks/week? -     Reviewed orders with patient.  Reviewed health maintenance and updated orders accordingly - Yes  Labs tomorrow morning fasting    Breast Cancer Screening:  Any new diagnosis of family breast, ovarian, or bowel cancer? No    FSH-7: No flowsheet data found.    Mammogram Screening: Recommended annual mammography, with 3D  Pertinent mammograms are reviewed under the imaging tab.    History of abnormal Pap smear: NO - age 30-65 PAP every 5 years with negative HPV co-testing recommended  PAP / HPV Latest Ref Rng & Units 2019   PAP - NIL NIL NIL   HPV 16 DNA NEG:Negative Negative - -   HPV 18 DNA NEG:Negative Negative - -   OTHER HR HPV NEG:Negative Negative - -     Reviewed and updated as needed this visit by clinical staff  Tobacco  Allergies  Meds   Med Hx  Surg Hx  Fam Hx  Soc Hx        Reviewed and updated as needed this visit by Provider                OB History    Para Term  AB Living   3 1 1 0 2 1   SAB TAB Ectopic Multiple Live Births   1 0 1 0 1      # Outcome Date GA Lbr Nikolai/2nd Weight Sex Delivery Anes PTL Lv   3 Term 12 39w3d 01:07 / 04:27 3.435 kg (7 lb 9.2 oz) M Vag-Spont  N SCOTT      Name: Carlos      Apgar1: 8  Apgar5: 9   2 SAB 12/29/10           1 Ectopic 06/19/10 7w0d              Review of Systems   Constitutional: Negative for chills and fever.   HENT: Negative for congestion, ear pain, hearing loss and sore throat.    Eyes: Negative for pain and visual disturbance.   Respiratory: Negative for cough and shortness of breath.    Cardiovascular: Negative for chest pain, palpitations and peripheral edema.   Gastrointestinal: Negative for abdominal pain, constipation, diarrhea, heartburn, hematochezia and nausea.   Breasts:  Negative for tenderness, breast mass and discharge.   Genitourinary: Negative for dysuria, frequency, genital sores, hematuria, pelvic pain, urgency, vaginal bleeding and vaginal discharge.  "  Musculoskeletal: Negative for arthralgias, joint swelling and myalgias.   Skin: Negative for rash.   Neurological: Negative for dizziness, weakness, headaches and paresthesias.   Psychiatric/Behavioral: Negative for mood changes. The patient is not nervous/anxious.        OBJECTIVE:   /64   Pulse 92   Temp 97.7  F (36.5  C)   Ht 1.668 m (5' 5.67\")   Wt 62.6 kg (138 lb 1.6 oz)   SpO2 97%   BMI 22.52 kg/m    Physical Exam  GENERAL: healthy, alert and no distress  EYES: Eyes grossly normal to inspection, conjunctivae and sclerae normal  HENT: ear canals and TM's normal, nose and mouth without ulcers or lesions  NECK: no adenopathy  RESP: lungs clear to auscultation - no rales, rhonchi or wheezes  CV: regular rate and rhythm, normal S1 S2, no S3 or S4, no murmur, click or rub, no peripheral edema  ABDOMEN: soft, nontender, no hepatosplenomegaly, no masses  MS: no gross musculoskeletal defects noted, no edema  SKIN: no suspicious lesions or rashes  NEURO: Normal strength and tone, mentation intact and speech normal  PSYCH: mentation appears normal, affect normal/bright    ASSESSMENT/PLAN:   1. Routine general medical examination at a health care facility  Up to date on health maintenance.    2. Need for hepatitis C screening test  - Hepatitis C Screen Reflex to HCV RNA Quant and Genotype; Future    3. Encounter for surveillance of contraceptive pills  - norgestimate-ethinyl estradiol (SPRINTEC 28) 0.25-35 MG-MCG tablet; TAKE 1 TABLET BY MOUTH DAILY. TAKE ACTIVE TABLETS DAILY  Dispense: 112 tablet; Refill: 2    4. Encounter for screening mammogram for breast cancer  - MA Screen Bilateral w/Mirza; Future    COUNSELING:  Reviewed preventive health counseling, as reflected in patient instructions    Estimated body mass index is 22.52 kg/m  as calculated from the following:    Height as of this encounter: 1.668 m (5' 5.67\").    Weight as of this encounter: 62.6 kg (138 lb 1.6 oz).    She reports that she has " never smoked. She has never used smokeless tobacco.    Counseling Resources:  ATP IV Guidelines  Pooled Cohorts Equation Calculator  Breast Cancer Risk Calculator  BRCA-Related Cancer Risk Assessment: FHS-7 Tool  FRAX Risk Assessment  ICSI Preventive Guidelines  Dietary Guidelines for Americans, 2010  USDA's MyPlate  ASA Prophylaxis  Lung CA Screening    Mira Caldwell MD  [facility]

## 2021-05-06 ENCOUNTER — MYC MEDICAL ADVICE (OUTPATIENT)
Dept: PEDIATRICS | Facility: CLINIC | Age: 51
End: 2021-05-06

## 2021-05-06 DIAGNOSIS — Z11.59 NEED FOR HEPATITIS C SCREENING TEST: ICD-10-CM

## 2021-05-06 DIAGNOSIS — Z13.1 SCREENING FOR DIABETES MELLITUS: ICD-10-CM

## 2021-05-06 LAB
CHOLEST SERPL-MCNC: 150 MG/DL
GLUCOSE SERPL-MCNC: 91 MG/DL (ref 70–99)
HCV AB SERPL QL IA: NONREACTIVE
HDLC SERPL-MCNC: 65 MG/DL
LDLC SERPL CALC-MCNC: 60 MG/DL
NONHDLC SERPL-MCNC: 85 MG/DL
TRIGL SERPL-MCNC: 125 MG/DL

## 2021-05-06 PROCEDURE — 82947 ASSAY GLUCOSE BLOOD QUANT: CPT | Performed by: STUDENT IN AN ORGANIZED HEALTH CARE EDUCATION/TRAINING PROGRAM

## 2021-05-06 PROCEDURE — 80061 LIPID PANEL: CPT | Performed by: STUDENT IN AN ORGANIZED HEALTH CARE EDUCATION/TRAINING PROGRAM

## 2021-05-06 PROCEDURE — 36415 COLL VENOUS BLD VENIPUNCTURE: CPT | Performed by: STUDENT IN AN ORGANIZED HEALTH CARE EDUCATION/TRAINING PROGRAM

## 2021-05-06 PROCEDURE — 86803 HEPATITIS C AB TEST: CPT | Performed by: STUDENT IN AN ORGANIZED HEALTH CARE EDUCATION/TRAINING PROGRAM

## 2021-05-07 ENCOUNTER — ANCILLARY PROCEDURE (OUTPATIENT)
Dept: MAMMOGRAPHY | Facility: CLINIC | Age: 51
End: 2021-05-07
Attending: STUDENT IN AN ORGANIZED HEALTH CARE EDUCATION/TRAINING PROGRAM
Payer: COMMERCIAL

## 2021-05-07 DIAGNOSIS — Z12.31 ENCOUNTER FOR SCREENING MAMMOGRAM FOR BREAST CANCER: ICD-10-CM

## 2021-05-07 PROCEDURE — 77063 BREAST TOMOSYNTHESIS BI: CPT | Mod: TC | Performed by: RADIOLOGY

## 2021-05-07 PROCEDURE — 77067 SCR MAMMO BI INCL CAD: CPT | Mod: TC | Performed by: RADIOLOGY

## 2021-05-07 NOTE — TELEPHONE ENCOUNTER
Will route to the station to help assist with forms below. Thanks.     Veronika Cruz, RN   [facility] -- Triage Nurse

## 2021-05-12 NOTE — TELEPHONE ENCOUNTER
Called and spoke with patient. Advised her form had been faxed and original was mailed to her. Patient in agreement with everything. Marisabel Law MA

## 2021-09-19 ENCOUNTER — HEALTH MAINTENANCE LETTER (OUTPATIENT)
Age: 51
End: 2021-09-19

## 2022-03-18 ENCOUNTER — MYC MEDICAL ADVICE (OUTPATIENT)
Dept: PEDIATRICS | Facility: CLINIC | Age: 52
End: 2022-03-18
Payer: COMMERCIAL

## 2022-03-18 DIAGNOSIS — Z30.41 ENCOUNTER FOR SURVEILLANCE OF CONTRACEPTIVE PILLS: ICD-10-CM

## 2022-03-21 RX ORDER — NORGESTIMATE AND ETHINYL ESTRADIOL 0.25-0.035
KIT ORAL
Qty: 112 TABLET | Refills: 2 | OUTPATIENT
Start: 2022-03-21

## 2022-03-22 DIAGNOSIS — Z30.41 ENCOUNTER FOR SURVEILLANCE OF CONTRACEPTIVE PILLS: ICD-10-CM

## 2022-03-22 RX ORDER — NORGESTIMATE AND ETHINYL ESTRADIOL 0.25-0.035
KIT ORAL
Qty: 112 TABLET | Refills: 1 | Status: SHIPPED | OUTPATIENT
Start: 2022-03-22 | End: 2022-07-26

## 2022-03-22 NOTE — TELEPHONE ENCOUNTER
Per chart review:   norgestimate-ethinyl estradiol (SPRINTEC 28) 0.25-35 MG-MCG tablet 112 tablet 2 11/1/2021  No   Sig: TAKE 1 TABLET BY MOUTH DAILY. TAKE ACTIVE TABLETS DAILY   Sent to pharmacy as: Norgestimate-Eth Estradiol 0.25-35 MG-MCG Oral Tablet (Sprintec 28)   Class: E-Prescribe   Notes to Pharmacy: Profile Rx: patient will contact pharmacy when needed   Order: [id number]   E-Prescribing Status: Receipt confirmed by pharmacy (5/5/2021  4:12 PM CDT)     Called and spoke with pharmacy, they do not have any refills on file. Prescription approved per [facility] Refill Protocol.      Tomas LEMUS RN

## 2022-03-24 RX ORDER — NORGESTIMATE AND ETHINYL ESTRADIOL 0.25-0.035
KIT ORAL
Qty: 112 TABLET | Refills: 1 | Status: CANCELLED | OUTPATIENT
Start: 2022-03-24

## 2022-03-24 NOTE — TELEPHONE ENCOUNTER
Duplicate request:   norgestimate-ethinyl estradiol (SPRINTEC 28) 0.25-35 MG-MCG tablet 112 tablet 1 3/22/2022  No   Sig: TAKE 1 TABLET BY MOUTH DAILY. TAKE ACTIVE TABLETS DAILY   Sent to pharmacy as: Norgestimate-Eth Estradiol 0.25-35 MG-MCG Oral Tablet (Sprintec 28)   Class: E-Prescribe   Order: [id number]   E-Prescribing Status: Receipt confirmed by pharmacy (3/22/2022  2:55 PM CDT)     Tomas LEMUS RN

## 2022-03-30 SDOH — ECONOMIC STABILITY: INCOME INSECURITY: IN THE LAST 12 MONTHS, WAS THERE A TIME WHEN YOU WERE NOT ABLE TO PAY THE MORTGAGE OR RENT ON TIME?: NO

## 2022-03-30 SDOH — ECONOMIC STABILITY: FOOD INSECURITY: WITHIN THE PAST 12 MONTHS, YOU WORRIED THAT YOUR FOOD WOULD RUN OUT BEFORE YOU GOT MONEY TO BUY MORE.: NEVER TRUE

## 2022-03-30 SDOH — HEALTH STABILITY: PHYSICAL HEALTH: ON AVERAGE, HOW MANY DAYS PER WEEK DO YOU ENGAGE IN MODERATE TO STRENUOUS EXERCISE (LIKE A BRISK WALK)?: 5 DAYS

## 2022-03-30 SDOH — ECONOMIC STABILITY: INCOME INSECURITY: HOW HARD IS IT FOR YOU TO PAY FOR THE VERY BASICS LIKE FOOD, HOUSING, MEDICAL CARE, AND HEATING?: NOT HARD AT ALL

## 2022-03-30 SDOH — ECONOMIC STABILITY: TRANSPORTATION INSECURITY
IN THE PAST 12 MONTHS, HAS LACK OF TRANSPORTATION KEPT YOU FROM MEETINGS, WORK, OR FROM GETTING THINGS NEEDED FOR DAILY LIVING?: NO

## 2022-03-30 SDOH — HEALTH STABILITY: PHYSICAL HEALTH: ON AVERAGE, HOW MANY MINUTES DO YOU ENGAGE IN EXERCISE AT THIS LEVEL?: 60 MIN

## 2022-03-30 SDOH — ECONOMIC STABILITY: TRANSPORTATION INSECURITY
IN THE PAST 12 MONTHS, HAS THE LACK OF TRANSPORTATION KEPT YOU FROM MEDICAL APPOINTMENTS OR FROM GETTING MEDICATIONS?: NO

## 2022-03-30 SDOH — ECONOMIC STABILITY: FOOD INSECURITY: WITHIN THE PAST 12 MONTHS, THE FOOD YOU BOUGHT JUST DIDN'T LAST AND YOU DIDN'T HAVE MONEY TO GET MORE.: NEVER TRUE

## 2022-03-30 ASSESSMENT — ENCOUNTER SYMPTOMS
CONSTIPATION: 0
EYE PAIN: 0
DIZZINESS: 0
PARESTHESIAS: 0
HEMATOCHEZIA: 0
JOINT SWELLING: 0
HEARTBURN: 0
BREAST MASS: 0
SORE THROAT: 0
ABDOMINAL PAIN: 0
PALPITATIONS: 0
DIARRHEA: 0
HEMATURIA: 0
SHORTNESS OF BREATH: 0
FEVER: 0
NAUSEA: 0
FREQUENCY: 1
ARTHRALGIAS: 0
CHILLS: 0
HEADACHES: 0
DYSURIA: 0
WEAKNESS: 0
COUGH: 0
NERVOUS/ANXIOUS: 0

## 2022-03-30 ASSESSMENT — SOCIAL DETERMINANTS OF HEALTH (SDOH)
HOW OFTEN DO YOU GET TOGETHER WITH FRIENDS OR RELATIVES?: TWICE A WEEK
HOW OFTEN DO YOU ATTEND CHURCH OR RELIGIOUS SERVICES?: NEVER
DO YOU BELONG TO ANY CLUBS OR ORGANIZATIONS SUCH AS CHURCH GROUPS UNIONS, FRATERNAL OR ATHLETIC GROUPS, OR SCHOOL GROUPS?: NO
IN A TYPICAL WEEK, HOW MANY TIMES DO YOU TALK ON THE PHONE WITH FAMILY, FRIENDS, OR NEIGHBORS?: ONCE A WEEK

## 2022-03-30 ASSESSMENT — LIFESTYLE VARIABLES
HOW OFTEN DO YOU HAVE SIX OR MORE DRINKS ON ONE OCCASION: NEVER
HOW OFTEN DO YOU HAVE A DRINK CONTAINING ALCOHOL: NEVER
HOW MANY STANDARD DRINKS CONTAINING ALCOHOL DO YOU HAVE ON A TYPICAL DAY: 1 OR 2

## 2022-04-05 ENCOUNTER — PATIENT OUTREACH (OUTPATIENT)
Dept: ONCOLOGY | Facility: CLINIC | Age: 52
End: 2022-04-05

## 2022-04-05 ENCOUNTER — OFFICE VISIT (OUTPATIENT)
Dept: PEDIATRICS | Facility: CLINIC | Age: 52
End: 2022-04-05
Payer: COMMERCIAL

## 2022-04-05 VITALS
DIASTOLIC BLOOD PRESSURE: 70 MMHG | WEIGHT: 145 LBS | SYSTOLIC BLOOD PRESSURE: 120 MMHG | RESPIRATION RATE: 16 BRPM | TEMPERATURE: 97.5 F | OXYGEN SATURATION: 99 % | HEART RATE: 82 BPM | HEIGHT: 66 IN | BODY MASS INDEX: 23.3 KG/M2

## 2022-04-05 DIAGNOSIS — N95.1 PERIMENOPAUSE: ICD-10-CM

## 2022-04-05 DIAGNOSIS — F51.01 PRIMARY INSOMNIA: ICD-10-CM

## 2022-04-05 DIAGNOSIS — Z00.00 ROUTINE GENERAL MEDICAL EXAMINATION AT A HEALTH CARE FACILITY: Primary | ICD-10-CM

## 2022-04-05 DIAGNOSIS — Z12.31 VISIT FOR SCREENING MAMMOGRAM: ICD-10-CM

## 2022-04-05 DIAGNOSIS — Z80.9 FAMILY HISTORY OF CANCER: ICD-10-CM

## 2022-04-05 DIAGNOSIS — Z12.11 SCREEN FOR COLON CANCER: ICD-10-CM

## 2022-04-05 PROCEDURE — 99396 PREV VISIT EST AGE 40-64: CPT | Performed by: INTERNAL MEDICINE

## 2022-04-05 RX ORDER — HYDROXYZINE HYDROCHLORIDE 25 MG/1
25-50 TABLET, FILM COATED ORAL
Qty: 100 TABLET | Refills: 4 | Status: SHIPPED | OUTPATIENT
Start: 2022-04-05 | End: 2024-03-15

## 2022-04-05 ASSESSMENT — ENCOUNTER SYMPTOMS
PALPITATIONS: 0
HEMATOCHEZIA: 0
CONSTIPATION: 0
SORE THROAT: 0
FREQUENCY: 1
NERVOUS/ANXIOUS: 0
COUGH: 0
ARTHRALGIAS: 0
WEAKNESS: 0
ABDOMINAL PAIN: 0
JOINT SWELLING: 0
BREAST MASS: 0
DYSURIA: 0
CHILLS: 0
PARESTHESIAS: 0
HEARTBURN: 0
EYE PAIN: 0
FEVER: 0
SHORTNESS OF BREATH: 0
DIARRHEA: 0
HEMATURIA: 0
NAUSEA: 0
DIZZINESS: 0
HEADACHES: 0

## 2022-04-05 NOTE — PROGRESS NOTES
SUBJECTIVE:   CC: Breonna Pinon is an 51 year old woman who presents for preventive health visit.       Patient has been advised of split billing requirements and indicates understanding: Yes  Healthy Habits:     Getting at least 3 servings of Calcium per day:  Yes    Bi-annual eye exam:  Yes    Dental care twice a year:  Yes    Sleep apnea or symptoms of sleep apnea:  None    Diet:  Gluten-free/reduced    Frequency of exercise:  4-5 days/week    Duration of exercise:  45-60 minutes    Taking medications regularly:  Yes    Medication side effects:  None    PHQ-2 Total Score: 0    Additional concerns today:  Yes  -moles has derm appt  -sleep issues  -perimenopause - night sweats and anxiety.  Is on OCP.  Suppresses menses with OCP but has some spotting recently.    Anxiety - dropped work to part time, exercising and watching diet.  Heart pulses.  Can't sleep.      Today's PHQ-2 Score:   PHQ-2 ( 1999 Pfizer) 3/30/2022   Q1: Little interest or pleasure in doing things 0   Q2: Feeling down, depressed or hopeless 0   PHQ-2 Score 0   PHQ-2 Total Score (12-17 Years)- Positive if 3 or more points; Administer PHQ-A if positive -   Q1: Little interest or pleasure in doing things Not at all   Q2: Feeling down, depressed or hopeless Not at all   PHQ-2 Score 0       Abuse: Current or Past (Physical, Sexual or Emotional) - No  Do you feel safe in your environment? Yes      Social History     Tobacco Use     Smoking status: Never Smoker     Smokeless tobacco: Never Used   Substance Use Topics     Alcohol use: Yes     Comment: rare         Alcohol Use 3/30/2022   Prescreen: >3 drinks/day or >7 drinks/week? No   Prescreen: >3 drinks/day or >7 drinks/week? -       Reviewed orders with patient.  Reviewed health maintenance and updated orders accordingly - Yes  Labs reviewed in EPIC    Breast Cancer Screening:    FHS-7:   Breast CA Risk Assessment (FHS-7) 3/30/2022   Did any of your first-degree relatives have breast or ovarian  cancer? No   Did any of your relatives have bilateral breast cancer? No   Did any man in your family have breast cancer? No   Did any woman in your family have breast and ovarian cancer? Yes   Did any woman in your family have breast cancer before age 50 y? No   Do you have 2 or more relatives with breast and/or ovarian cancer? Yes   Do you have 2 or more relatives with breast and/or bowel cancer? No   two aunts on mom's side with cancer - one breast and one ovarian.    Mammogram Screening: Recommended annual mammography  Pertinent mammograms are reviewed under the imaging tab.    History of abnormal Pap smear: NO - age 30-65 PAP every 5 years with negative HPV co-testing recommended  PAP / HPV Latest Ref Rng & Units 4/5/2019 8/21/2014 7/5/2013   PAP (Historical) - NIL NIL NIL   HPV16 NEG:Negative Negative - -   HPV18 NEG:Negative Negative - -   HRHPV NEG:Negative Negative - -     Reviewed and updated as needed this visit by clinical staff   Tobacco  Allergies  Meds  Problems  Med Hx  Surg Hx  Fam Hx  Soc   Hx        Reviewed and updated as needed this visit by Provider   Tobacco  Allergies  Meds  Problems  Med Hx  Surg Hx  Fam Hx           Review of Systems   Constitutional: Negative for chills and fever.   HENT: Negative for congestion, ear pain, hearing loss and sore throat.    Eyes: Negative for pain and visual disturbance.   Respiratory: Negative for cough and shortness of breath.    Cardiovascular: Negative for chest pain, palpitations and peripheral edema.   Gastrointestinal: Negative for abdominal pain, constipation, diarrhea, heartburn, hematochezia and nausea.   Breasts:  Negative for tenderness, breast mass and discharge.   Genitourinary: Positive for frequency. Negative for dysuria, genital sores, hematuria, pelvic pain, urgency, vaginal bleeding and vaginal discharge.   Musculoskeletal: Negative for arthralgias and joint swelling.   Skin: Negative for rash.   Neurological: Negative for  "dizziness, weakness, headaches and paresthesias.   Psychiatric/Behavioral: Negative for mood changes. The patient is not nervous/anxious.    having more \"sphincter pain\" that she has had for many years.  Will wake her from sleep and takes ibuprofen.    Moles changing - has an appointment with derm later in the month     OBJECTIVE:   /70   Pulse 82   Temp 97.5  F (36.4  C)   Resp 16   Ht 1.668 m (5' 5.67\")   Wt 65.8 kg (145 lb)   SpO2 99%   BMI 23.64 kg/m    Physical Exam  GENERAL: healthy, alert and no distress  EYES: Eyes grossly normal to inspection, PERRL and conjunctivae and sclerae normal  HENT: ear canals and TM's normal, nose and mouth without ulcers or lesions  NECK: no adenopathy, no asymmetry, masses, or scars and thyroid normal to palpation  RESP: lungs clear to auscultation - no rales, rhonchi or wheezes  CV: regular rate and rhythm, normal S1 S2, no S3 or S4, no murmur, click or rub, no peripheral edema and peripheral pulses strong  ABDOMEN: soft, nontender, no hepatosplenomegaly, no masses and bowel sounds normal  MS: no gross musculoskeletal defects noted, no edema  SKIN: a few atypical nevi  NEURO: Normal strength and tone, mentation intact and speech normal  PSYCH: mentation appears normal, affect normal/bright    Diagnostic Test Results:  Labs reviewed in Epic    ASSESSMENT/PLAN:       ICD-10-CM    1. Routine general medical examination at a health care facility  Z00.00    2. Primary insomnia  F51.01 hydrOXYzine (ATARAX) 25 MG tablet   3. Family history of cancer  Z80.9 Adult Oncology/Hematology Referral   4. Visit for screening mammogram  Z12.31 MA Screen Bilateral w/Mirza   5. Perimenopause  N95.1    6. Screen for colon cancer  Z12.11 Adult Gastro Ref - Procedure Only   Routine health education discussed: calcium, diet, exercise, weight, safety. Discussed perimenopause and coping.  Continue ocp for now.          COUNSELING:  Reviewed preventive health counseling, as reflected in " "patient instructions    Estimated body mass index is 23.64 kg/m  as calculated from the following:    Height as of this encounter: 1.668 m (5' 5.67\").    Weight as of this encounter: 65.8 kg (145 lb).        She reports that she has never smoked. She has never used smokeless tobacco.      Counseling Resources:  ATP IV Guidelines  Pooled Cohorts Equation Calculator  Breast Cancer Risk Calculator  BRCA-Related Cancer Risk Assessment: FHS-7 Tool  FRAX Risk Assessment  ICSI Preventive Guidelines  Dietary Guidelines for Americans, 2010  USDA's MyPlate  ASA Prophylaxis  Lung CA Screening    Kaley Carolina MD  [facility]  "

## 2022-04-05 NOTE — PATIENT INSTRUCTIONS
"Try the hydroxyzine at bedtime to help with sleep and anxiety.  Try the estroven to help as well.  Let me know if it isn't controlling your symptoms.    Try differin over the counter to help with your skin - start weekly and gradually increase if you do not have irritation    They will call you to schedule the colonoscopy and the mammogram and cancer genetics specialist    Read \"Come as you are\" By Zahira León about sexual relationships    Preventive Health Recommendations  Female Ages 40 to 49    Yearly exam:     See your health care provider every year in order to  1. Review health changes.   2. Discuss preventive care.    3. Review your medicines if your doctor prescribed any.      If you get Pap tests with HPV test, you only need to test every 5 years, unless you have an abnormal result.       They will call you for the mammogram.      Have a colonoscopy (test for colon cancer) - they will call you       Have a cholesterol test every 5 years.       Have a diabetes test (fasting glucose) after age 45. If you are at risk for diabetes, you should have this test every 3 years.    Shots: Get a flu shot each year. Get a tetanus shot every 10 years.  Get your shingles shot when you turn 50.  You can schedule at our pharmacy at [email or web address]    Nutrition:     Eat at least 5 servings of fruits and vegetables each day.    Eat whole-grain bread, whole-wheat pasta and brown rice instead of white grains and rice.    Get adequate Calcium and Vitamin D.      Lifestyle    Exercise at least 150 minutes a week (an average of 30 minutes a day, 5 days a week). This will help you control your weight and prevent disease.    Limit alcohol to one drink per day.    No smoking.     Wear sunscreen to prevent skin cancer.  See your dentist every six months for an exam and cleaning.  "

## 2022-04-29 ENCOUNTER — OFFICE VISIT (OUTPATIENT)
Dept: FAMILY MEDICINE | Facility: CLINIC | Age: 52
End: 2022-04-29
Payer: COMMERCIAL

## 2022-04-29 VITALS — DIASTOLIC BLOOD PRESSURE: 62 MMHG | SYSTOLIC BLOOD PRESSURE: 104 MMHG

## 2022-04-29 DIAGNOSIS — L81.4 LENTIGINES: ICD-10-CM

## 2022-04-29 DIAGNOSIS — D49.2 NEOPLASM OF UNSPECIFIED BEHAVIOR OF BONE, SOFT TISSUE, AND SKIN: ICD-10-CM

## 2022-04-29 DIAGNOSIS — D22.9 MULTIPLE BENIGN NEVI: ICD-10-CM

## 2022-04-29 DIAGNOSIS — L82.1 SEBORRHEIC KERATOSIS: Primary | ICD-10-CM

## 2022-04-29 DIAGNOSIS — D18.01 CHERRY ANGIOMA: ICD-10-CM

## 2022-04-29 PROCEDURE — 88341 IMHCHEM/IMCYTCHM EA ADD ANTB: CPT | Performed by: PATHOLOGY

## 2022-04-29 PROCEDURE — 99203 OFFICE O/P NEW LOW 30 MIN: CPT | Mod: 25 | Performed by: DERMATOLOGY

## 2022-04-29 PROCEDURE — 88305 TISSUE EXAM BY PATHOLOGIST: CPT | Performed by: PATHOLOGY

## 2022-04-29 PROCEDURE — 88342 IMHCHEM/IMCYTCHM 1ST ANTB: CPT | Performed by: PATHOLOGY

## 2022-04-29 PROCEDURE — 11103 TANGNTL BX SKIN EA SEP/ADDL: CPT | Performed by: DERMATOLOGY

## 2022-04-29 PROCEDURE — 11102 TANGNTL BX SKIN SINGLE LES: CPT | Performed by: DERMATOLOGY

## 2022-04-29 NOTE — PROGRESS NOTES
[facility] Dermatology Clinic Note - EP    Encounter Date: Apr 29, 2022    Dermatology Problem List:  #.  Lichen sclerosus et atrophicus diagnosed by biopsy in the patient's 20's.  Initially treated with testosterone and then topical corticosteroids and finally Protopic.  Subsequently followed by Dr. Higgins and Dr. Carolina.  Currently using diaper paste to the areas as primary treatment.   #.  Tinea pedis.   #.  Onychomycosis.   #.  History of dysplastic nevus.   #.  Benign pigmented nevi.   #.  Keratosis pilaris.   #.  NUBS x2 s/p bx 4/29/22  - L lower leg, ddx lentigo   - L chest, ddx atypical vs inflamed nevus  ____________________________________________    Assessment & Plan:     #. NUBs x2  - L lower leg, ddx lentigo   - L chest, ddx atypical vs inflamed nevus  - Shave biopsy x2 today, see procedure note below      #. Multiple benign nevi. Solar lentigines.   - No concerning lesions today  - Monitor for ABCDEs of melanoma   - Continue sun protection - recommend SPF 30 or higher with frequent application   - Return sooner if noticing changing or symptomatic lesions     # Seborrheic keratoses. Cherry angiomas.   Discussed the natural history and benign nature of this lesion. Reassurance provided that no additional treatment is necessary.     Procedures Performed:   - Shave biopsy procedure note, location(s): See above. After discussion of benefits and risks including but not limited to bleeding, infection, scar, incomplete removal, recurrence, and non-diagnostic biopsy, written consent and photographs were obtained. The area was cleaned with isopropyl alcohol. 0.5mL of 1% lidocaine with epinephrine was injected to obtain adequate anesthesia of lesion(s). Shave biopsy at site(s) performed. Hemostasis was achieved with aluminium chloride. Petrolatum ointment and a sterile dressing were applied. The patient tolerated the procedure and no complications were noted. The patient was provided with verbal and written post  care instructions.     Follow-up: 1 year or sooner pending path results    Scribe Disclosure:  I, Joselin Aquino, am serving as a scribe to document services personally performed by Erich Rendon MD based on data collection and the provider's statements to me.     Provider Disclosure:  The documentation recorded by the scribe accurately reflects the services I personally performed and the decisions made by me.     Erich Rendon MD  Dermatology/Dermatopathology Staff Physician  , Department of Dermatology    ____________________________________________    CC: Skin Check      HPI:  Ms. Breonna Pinon is a(n) extremely pleasant 51 year old female who presents today as a new patient for [facility].    Today, the patient points to a lesion on her L lower leg that has been present forever but she suspects is slightly larger in size. She also points to a darker lesion non her L shoulder that she would like examined. There is also a lesion on her L chest that use to became scaly after applying zinc oxide sun screen. Patient is otherwise feeling well, without additional skin concerns.     Labs Reviewed:  N/A    Physical Exam:  Vitals: /62   SKIN: Full skin, which includes the head/face, both arms, chest, back, abdomen,both legs, genitalia and/or groin buttocks, digits and/or nails, was examined.  - On the L lower leg, there is a 1 cm x 8 mm light brown patch which is roughly symmetric, has a slightly eccentric 2 mm darker area with thickening of the reticular network. In the light brown patch, a definitive reticular network is not seen and non pigment network suggestive of lentigo is favored.   - 5 mm brown papule with grey dots of granules in it and a peripheral reticular network on the L chest.   - There are dome shaped bright red papules on the trunk and extremities.   - Multiple regular brown pigmented macules and papules are identified on the trunk and extremities. 3 mm darker brown macule  with an even and symmetrical pigment network.   - Scattered brown macules on sun exposed areas.  - There are waxy stuck on tan to brown papules on the trunk and extremities.  - No other lesions of concern on areas examined.     Medications:  Current Outpatient Medications   Medication     cetirizine (ZYRTEC) 10 MG tablet     hydrOXYzine (ATARAX) 25 MG tablet     lactase (LACTAID) 3000 UNIT tablet     Lactobacillus Acid-Pectin CAPS     norgestimate-ethinyl estradiol (SPRINTEC 28) 0.25-35 MG-MCG tablet     No current facility-administered medications for this visit.      Past Medical History:   Patient Active Problem List   Diagnosis     Allergic rhinitis     CARDIOVASCULAR SCREENING; LDL GOAL LESS THAN 160     Lichen sclerosus et atrophicus     Lactose intolerance     H/O dysplastic nevus     Perioral dermatitis     Past Medical History:   Diagnosis Date     Abnormal Pap smear      Allergic rhinitis, cause unspecified      Anemia      Fertility problem      Other acne        CC Referred Self, MD  No address on file on close of this encounter.    This note has been created using voice recognition software; while it has been reviewed, some errors may persist.

## 2022-04-29 NOTE — LETTER
4/29/2022         RE: Breonna Pinon  [address] [facility] [address]        Dear Colleague,    Thank you for referring your patient, Breonna Pinon, to the [facility]. Please see a copy of my visit note below.    [facility] Dermatology Clinic Note - EP    Encounter Date: Apr 29, 2022    Dermatology Problem List:  #.  Lichen sclerosus et atrophicus diagnosed by biopsy in the patient's 20's.  Initially treated with testosterone and then topical corticosteroids and finally Protopic.  Subsequently followed by Dr. Higgins and Dr. Carolina.  Currently using diaper paste to the areas as primary treatment.   #.  Tinea pedis.   #.  Onychomycosis.   #.  History of dysplastic nevus.   #.  Benign pigmented nevi.   #.  Keratosis pilaris.   #.  NUBS x2 s/p bx 4/29/22  - L lower leg, ddx lentigo   - L chest, ddx atypical vs inflamed nevus  ____________________________________________    Assessment & Plan:     #. NUBs x2  - L lower leg, ddx lentigo   - L chest, ddx atypical vs inflamed nevus  - Shave biopsy x2 today, see procedure note below      #. Multiple benign nevi. Solar lentigines.   - No concerning lesions today  - Monitor for ABCDEs of melanoma   - Continue sun protection - recommend SPF 30 or higher with frequent application   - Return sooner if noticing changing or symptomatic lesions     # Seborrheic keratoses. Cherry angiomas.   Discussed the natural history and benign nature of this lesion. Reassurance provided that no additional treatment is necessary.     Procedures Performed:   - Shave biopsy procedure note, location(s): See above. After discussion of benefits and risks including but not limited to bleeding, infection, scar, incomplete removal, recurrence, and non-diagnostic biopsy, written consent and photographs were obtained. The area was cleaned with isopropyl alcohol. 0.5mL of 1% lidocaine with epinephrine was injected to obtain adequate anesthesia of lesion(s). Shave biopsy at  site(s) performed. Hemostasis was achieved with aluminium chloride. Petrolatum ointment and a sterile dressing were applied. The patient tolerated the procedure and no complications were noted. The patient was provided with verbal and written post care instructions.     Follow-up: 1 year or sooner pending path results    Scribe Disclosure:  I, Joselin Aquino, am serving as a scribe to document services personally performed by Erich Rendon MD based on data collection and the provider's statements to me.     Provider Disclosure:  The documentation recorded by the scribe accurately reflects the services I personally performed and the decisions made by me.     Erich Rendon MD  Dermatology/Dermatopathology Staff Physician  , Department of Dermatology    ____________________________________________    CC: Skin Check      HPI:  Ms. Breonna Pinon is a(n) extremely pleasant 51 year old female who presents today as a new patient for [facility].    Today, the patient points to a lesion on her L lower leg that has been present forever but she suspects is slightly larger in size. She also points to a darker lesion non her L shoulder that she would like examined. There is also a lesion on her L chest that use to became scaly after applying zinc oxide sun screen. Patient is otherwise feeling well, without additional skin concerns.     Labs Reviewed:  N/A    Physical Exam:  Vitals: /62   SKIN: Full skin, which includes the head/face, both arms, chest, back, abdomen,both legs, genitalia and/or groin buttocks, digits and/or nails, was examined.  - On the L lower leg, there is a 1 cm x 8 mm light brown patch which is roughly symmetric, has a slightly eccentric 2 mm darker area with thickening of the reticular network. In the light brown patch, a definitive reticular network is not seen and non pigment network suggestive of lentigo is favored.   - 5 mm brown papule with grey dots of granules in it and a  peripheral reticular network on the L chest.   - There are dome shaped bright red papules on the trunk and extremities.   - Multiple regular brown pigmented macules and papules are identified on the trunk and extremities. 3 mm darker brown macule with an even and symmetrical pigment network.   - Scattered brown macules on sun exposed areas.  - There are waxy stuck on tan to brown papules on the trunk and extremities.  - No other lesions of concern on areas examined.     Medications:  Current Outpatient Medications   Medication     cetirizine (ZYRTEC) 10 MG tablet     hydrOXYzine (ATARAX) 25 MG tablet     lactase (LACTAID) 3000 UNIT tablet     Lactobacillus Acid-Pectin CAPS     norgestimate-ethinyl estradiol (SPRINTEC 28) 0.25-35 MG-MCG tablet     No current facility-administered medications for this visit.      Past Medical History:   Patient Active Problem List   Diagnosis     Allergic rhinitis     CARDIOVASCULAR SCREENING; LDL GOAL LESS THAN 160     Lichen sclerosus et atrophicus     Lactose intolerance     H/O dysplastic nevus     Perioral dermatitis     Past Medical History:   Diagnosis Date     Abnormal Pap smear      Allergic rhinitis, cause unspecified      Anemia      Fertility problem      Other acne        CC Referred Self, MD  No address on file on close of this encounter.    This note has been created using voice recognition software; while it has been reviewed, some errors may persist.         Again, thank you for allowing me to participate in the care of your patient.        Sincerely,        Erich Rendon MD

## 2022-05-12 LAB
PATH REPORT.COMMENTS IMP SPEC: NORMAL
PATH REPORT.FINAL DX SPEC: NORMAL
PATH REPORT.GROSS SPEC: NORMAL
PATH REPORT.MICROSCOPIC SPEC OTHER STN: NORMAL
PATH REPORT.RELEVANT HX SPEC: NORMAL

## 2022-05-13 ENCOUNTER — TELEPHONE (OUTPATIENT)
Dept: DERMATOLOGY | Facility: CLINIC | Age: 52
End: 2022-05-13
Payer: COMMERCIAL

## 2022-05-13 ENCOUNTER — TELEPHONE (OUTPATIENT)
Dept: FAMILY MEDICINE | Facility: CLINIC | Age: 52
End: 2022-05-13
Payer: COMMERCIAL

## 2022-05-13 ENCOUNTER — MYC MEDICAL ADVICE (OUTPATIENT)
Dept: PEDIATRICS | Facility: CLINIC | Age: 52
End: 2022-05-13
Payer: COMMERCIAL

## 2022-05-13 DIAGNOSIS — Z86.018 H/O DYSPLASTIC NEVUS: Primary | ICD-10-CM

## 2022-05-13 NOTE — TELEPHONE ENCOUNTER
Nothing sooner. Notified patient and she will keep appointment with Dr. Man. No further questions.    Mita Gastelum, EMT

## 2022-05-13 NOTE — TELEPHONE ENCOUNTER
Patient Contact    Attempt # 1    Was call answered?  No.  Left message on voicemail with information to call dermatology nurse at [phone] advised no vm, if no answer to call back at a later time.      On call back: Give Dr. Rendon's message below and schedule with Dr. Man.    Rose HOUSTON RN  [facility]  [phone]

## 2022-05-13 NOTE — TELEPHONE ENCOUNTER
[facility] Call Center    Phone Message    May a detailed message be left on voicemail: yes     Reason for Call: Appointment Intake    Referring Provider Name: Erich Rendon MD  Diagnosis and/or Symptoms: Mohs referral for priority 1-2 weeks   Pt is scheduled at [place] 06/07 and received a referral to be seen sooner. Please review and call Pt to discuss. Thanks     Action Taken: Message routed to:  [facility] Derm    Travel Screening: Not Applicable

## 2022-05-13 NOTE — TELEPHONE ENCOUNTER
Pt called back and saw results on my chart.  Pt was given Dr. Rendon's message below and scheduled with Dr. Man on Tuesday June 7, 2022 at 7:30 am in [place].    Pt is ok going to [facility] and referral placed to see if able to be seen sooner.    Scheduled 6 month skin check with Sharda Salinas at  on 11/18 at 9:00 am.    Mohs letter and information sent via my chart and mailed home.    Pt states understanding.    Rose HOUSTON RN  [facility] [place]  [phone]

## 2022-05-13 NOTE — LETTER
[facility]  [address] [place] [address]  [phone]      May 13, 2022    Breonna Pinon  [address] [place] [id number]      Dear Breonna      You are scheduled for Mohs Surgery on:  Tuesday June 7, 2022 at 7:30 am    Please check in at 2nd floor Dermatology Clinic.     You don't need to arrive more than 5-10 minutes prior to your appointment time.     Be sure to eat a good breakfast and bathe and wash your hair prior to Surgery. Please bring  with you if this is above your neck    If you are taking any anti-coagulants that are prescribed by your Doctor (such as Coumadin/warfarin, Plavix, Aspirin, Ibuprofen), please continue taking them.     However, If you are taking anti-coagulants over the counter without  a Doctor's order for a Medical condition, please discontinue them 10 days prior to Surgery.      Please wear loose comfortable clothing as it could possibly be 4-6 hours until your surgery is completed depending upon how many layers of tissue need to be removed.     Thank you,    Robert Man MD

## 2022-05-13 NOTE — TELEPHONE ENCOUNTER
----- Message from Erich Rendon MD sent at 5/13/2022  7:59 AM CDT -----  Biopsy showed an atypical melanocytic proliferation - please schedule at next available excision slot with Donovan (going to be large - 1.3cm with at least 5mm margins), and ensure 6 month follow up skin check with me is scheduled. Thanks!

## 2022-05-17 ENCOUNTER — TELEPHONE (OUTPATIENT)
Dept: DERMATOLOGY | Facility: CLINIC | Age: 52
End: 2022-05-17
Payer: COMMERCIAL

## 2022-05-17 NOTE — TELEPHONE ENCOUNTER
[facility] Call Center    Phone Message    May a detailed message be left on voicemail: yes     Reason for Call: Other: Pt is currently scheduled at [facility] with Dr Man for a MOHS procedure on 6/7, however, she is wondering whether he might have something sooner at his [facility] location. Please call pt to let her know, thanks!     Action Taken: Message routed to:  Other: [facility] Derm Surgery    Travel Screening: Not Applicable

## 2022-05-17 NOTE — TELEPHONE ENCOUNTER
S/w pt and advised no openings at [facility] for Mohs.  Pt states understanding and will keep her appt on 6/7 in [place].    Rose HOUSTON RN  [facility] [place]  [phone]

## 2022-05-20 ENCOUNTER — ANCILLARY PROCEDURE (OUTPATIENT)
Dept: MAMMOGRAPHY | Facility: CLINIC | Age: 52
End: 2022-05-20
Attending: INTERNAL MEDICINE
Payer: COMMERCIAL

## 2022-05-20 DIAGNOSIS — Z12.31 VISIT FOR SCREENING MAMMOGRAM: ICD-10-CM

## 2022-05-20 PROCEDURE — 77063 BREAST TOMOSYNTHESIS BI: CPT | Mod: TC | Performed by: RADIOLOGY

## 2022-05-20 PROCEDURE — 77067 SCR MAMMO BI INCL CAD: CPT | Mod: TC | Performed by: RADIOLOGY

## 2022-05-25 DIAGNOSIS — Z11.59 ENCOUNTER FOR SCREENING FOR OTHER VIRAL DISEASES: Primary | ICD-10-CM

## 2022-05-27 ENCOUNTER — MYC MEDICAL ADVICE (OUTPATIENT)
Dept: PEDIATRICS | Facility: CLINIC | Age: 52
End: 2022-05-27
Payer: COMMERCIAL

## 2022-05-27 NOTE — TELEPHONE ENCOUNTER
Pfizer booster added to immunization history per covid card supplied by patient via [employer].

## 2022-05-29 ENCOUNTER — LAB (OUTPATIENT)
Dept: LAB | Facility: CLINIC | Age: 52
End: 2022-05-29
Attending: INTERNAL MEDICINE
Payer: COMMERCIAL

## 2022-05-29 DIAGNOSIS — Z11.59 ENCOUNTER FOR SCREENING FOR OTHER VIRAL DISEASES: ICD-10-CM

## 2022-05-29 PROCEDURE — U0003 INFECTIOUS AGENT DETECTION BY NUCLEIC ACID (DNA OR RNA); SEVERE ACUTE RESPIRATORY SYNDROME CORONAVIRUS 2 (SARS-COV-2) (CORONAVIRUS DISEASE [COVID-19]), AMPLIFIED PROBE TECHNIQUE, MAKING USE OF HIGH THROUGHPUT TECHNOLOGIES AS DESCRIBED BY CMS-2020-01-R: HCPCS

## 2022-05-30 LAB — SARS-COV-2 RNA RESP QL NAA+PROBE: NEGATIVE

## 2022-06-02 ENCOUNTER — HOSPITAL ENCOUNTER (OUTPATIENT)
Facility: CLINIC | Age: 52
Discharge: HOME | End: 2022-06-02
Attending: INTERNAL MEDICINE | Admitting: INTERNAL MEDICINE
Payer: COMMERCIAL

## 2022-06-02 VITALS
SYSTOLIC BLOOD PRESSURE: 117 MMHG | BODY MASS INDEX: 23.3 KG/M2 | TEMPERATURE: 99 F | RESPIRATION RATE: 16 BRPM | OXYGEN SATURATION: 96 % | HEART RATE: 67 BPM | HEIGHT: 66 IN | DIASTOLIC BLOOD PRESSURE: 75 MMHG | WEIGHT: 145 LBS

## 2022-06-02 LAB — COLONOSCOPY: NORMAL

## 2022-06-02 PROCEDURE — G0121 COLON CA SCRN NOT HI RSK IND: HCPCS | Performed by: INTERNAL MEDICINE

## 2022-06-02 PROCEDURE — 45378 DIAGNOSTIC COLONOSCOPY: CPT | Performed by: INTERNAL MEDICINE

## 2022-06-02 PROCEDURE — 250N000011 HC RX IP 250 OP 636: Performed by: INTERNAL MEDICINE

## 2022-06-02 PROCEDURE — G0500 MOD SEDAT ENDO SERVICE >5YRS: HCPCS | Performed by: INTERNAL MEDICINE

## 2022-06-02 RX ORDER — NALOXONE HYDROCHLORIDE 0.4 MG/ML
0.2 INJECTION, SOLUTION INTRAMUSCULAR; INTRAVENOUS; SUBCUTANEOUS
Status: DISCONTINUED | OUTPATIENT
Start: 2022-06-02 | End: 2022-06-02 | Stop reason: HOSPADM

## 2022-06-02 RX ORDER — LIDOCAINE 40 MG/G
CREAM TOPICAL
Status: DISCONTINUED | OUTPATIENT
Start: 2022-06-02 | End: 2022-06-02 | Stop reason: HOSPADM

## 2022-06-02 RX ORDER — FENTANYL CITRATE 0.05 MG/ML
50-100 INJECTION, SOLUTION INTRAMUSCULAR; INTRAVENOUS EVERY 5 MIN PRN
Status: DISCONTINUED | OUTPATIENT
Start: 2022-06-02 | End: 2022-06-02 | Stop reason: HOSPADM

## 2022-06-02 RX ORDER — FLUMAZENIL 0.1 MG/ML
0.2 INJECTION, SOLUTION INTRAVENOUS
Status: DISCONTINUED | OUTPATIENT
Start: 2022-06-02 | End: 2022-06-02 | Stop reason: HOSPADM

## 2022-06-02 RX ORDER — DIPHENHYDRAMINE HYDROCHLORIDE 50 MG/ML
25-50 INJECTION INTRAMUSCULAR; INTRAVENOUS
Status: DISCONTINUED | OUTPATIENT
Start: 2022-06-02 | End: 2022-06-02 | Stop reason: HOSPADM

## 2022-06-02 RX ORDER — ATROPINE SULFATE 0.1 MG/ML
1 INJECTION INTRAVENOUS
Status: DISCONTINUED | OUTPATIENT
Start: 2022-06-02 | End: 2022-06-02 | Stop reason: HOSPADM

## 2022-06-02 RX ORDER — ONDANSETRON 2 MG/ML
4 INJECTION INTRAMUSCULAR; INTRAVENOUS EVERY 6 HOURS PRN
Status: DISCONTINUED | OUTPATIENT
Start: 2022-06-02 | End: 2022-06-02 | Stop reason: HOSPADM

## 2022-06-02 RX ORDER — PROCHLORPERAZINE MALEATE 10 MG
10 TABLET ORAL EVERY 6 HOURS PRN
Status: DISCONTINUED | OUTPATIENT
Start: 2022-06-02 | End: 2022-06-02 | Stop reason: HOSPADM

## 2022-06-02 RX ORDER — EPINEPHRINE 1 MG/ML
0.1 INJECTION, SOLUTION INTRAMUSCULAR; SUBCUTANEOUS
Status: DISCONTINUED | OUTPATIENT
Start: 2022-06-02 | End: 2022-06-02 | Stop reason: HOSPADM

## 2022-06-02 RX ORDER — ONDANSETRON 4 MG/1
4 TABLET, ORALLY DISINTEGRATING ORAL EVERY 6 HOURS PRN
Status: DISCONTINUED | OUTPATIENT
Start: 2022-06-02 | End: 2022-06-02 | Stop reason: HOSPADM

## 2022-06-02 RX ORDER — NALOXONE HYDROCHLORIDE 0.4 MG/ML
0.4 INJECTION, SOLUTION INTRAMUSCULAR; INTRAVENOUS; SUBCUTANEOUS
Status: DISCONTINUED | OUTPATIENT
Start: 2022-06-02 | End: 2022-06-02 | Stop reason: HOSPADM

## 2022-06-02 RX ORDER — ONDANSETRON 2 MG/ML
4 INJECTION INTRAMUSCULAR; INTRAVENOUS
Status: DISCONTINUED | OUTPATIENT
Start: 2022-06-02 | End: 2022-06-02 | Stop reason: HOSPADM

## 2022-06-02 RX ORDER — SIMETHICONE 40MG/0.6ML
133 SUSPENSION, DROPS(FINAL DOSAGE FORM)(ML) ORAL
Status: DISCONTINUED | OUTPATIENT
Start: 2022-06-02 | End: 2022-06-02 | Stop reason: HOSPADM

## 2022-06-02 RX ADMIN — MIDAZOLAM HYDROCHLORIDE 1 MG: 1 INJECTION, SOLUTION INTRAMUSCULAR; INTRAVENOUS at 12:12

## 2022-06-02 RX ADMIN — MIDAZOLAM HYDROCHLORIDE 1 MG: 1 INJECTION, SOLUTION INTRAMUSCULAR; INTRAVENOUS at 12:10

## 2022-06-02 RX ADMIN — MIDAZOLAM HYDROCHLORIDE 2 MG: 1 INJECTION, SOLUTION INTRAMUSCULAR; INTRAVENOUS at 12:04

## 2022-06-02 RX ADMIN — FENTANYL CITRATE 100 MCG: 50 INJECTION INTRAMUSCULAR; INTRAVENOUS at 12:04

## 2022-06-02 NOTE — H&P
Pre-Endoscopy History and Physical     Breonna Pinon MRN# [id number]   YOB: 1970 Age: 51 year old     Date of Procedure: 2022  Primary care provider: Kaley Carolina  Type of Endoscopy: Colonoscopy with possible biopsy, possible polypectomy  Reason for Procedure: screen  Type of Anesthesia Anticipated: Conscious Sedation    HPI:    Breonna is a 51 year old female who will be undergoing the above procedure.      A history and physical has been performed. The patient's medications and allergies have been reviewed. The risks and benefits of the procedure and the sedation options and risks were discussed with the patient.  All questions were answered and informed consent was obtained.      She denies a personal or family history of anesthesia complications or bleeding disorders.     Patient Active Problem List   Diagnosis     Allergic rhinitis     CARDIOVASCULAR SCREENING; LDL GOAL LESS THAN 160     Lichen sclerosus et atrophicus     Lactose intolerance     H/O dysplastic nevus     Perioral dermatitis        Past Medical History:   Diagnosis Date     Abnormal Pap smear      Allergic rhinitis, cause unspecified      Anemia      Fertility problem      Other acne         Past Surgical History:   Procedure Laterality Date     BIOPSY OF SKIN LESION  2014     COLPOSCOPY,LOOP ELECTRD CERVIX EXCIS       D & C  12/29/10    suction D&C for missed      HC REMOVAL GALLBLADDER       HC REMOVE TONSILS/ADENOIDS,<11 Y/O       LAPAROSCOPY PROCEDURE UNLISTED  2010    removal of R ampullary ectopic pregnancy     LASIK         Social History     Tobacco Use     Smoking status: Never Smoker     Smokeless tobacco: Never Used   Substance Use Topics     Alcohol use: Yes     Comment: rare       Family History   Problem Relation Age of Onset     Hypertension Mother      Alcohol/Drug Father      Alcohol/Drug Maternal Grandmother      Cancer Maternal Grandmother         lung/liver     Alcohol/Drug Maternal  "Grandfather      Cancer Maternal Grandfather         lung/liver     Cancer Paternal Grandmother         brain     Cancer Maternal Aunt         ovarian     Breast Cancer Maternal Aunt      Colon Cancer No family hx of        Prior to Admission medications    Medication Sig Start Date End Date Taking? Authorizing Provider   cetirizine (ZYRTEC) 10 MG tablet Take 1 tablet (10 mg) by mouth every evening 8/21/14  Yes Abeba Alexander MD   hydrOXYzine (ATARAX) 25 MG tablet Take 1-2 tablets (25-50 mg) by mouth every evening as needed for anxiety (or sleep) 4/5/22  Yes Kaley Carolina MD   lactase (LACTAID) 3000 UNIT tablet Take 3,000 Units by mouth 3 times daily (with meals)   Yes Reported, Patient   norgestimate-ethinyl estradiol (SPRINTEC 28) 0.25-35 MG-MCG tablet TAKE 1 TABLET BY MOUTH DAILY. TAKE ACTIVE TABLETS DAILY 3/22/22  Yes Mira Caldwell MD   Lactobacillus Acid-Pectin CAPS Take 1 capsule by mouth daily. 11/30/11   Randy Higgins MD       Allergies   Allergen Reactions     Gluten      intolerance in foods     Lactose      intolerance, dairy foods     Seasonal Allergies      Cortisone Dermatitis      When pt takes cortisone, causes perioral dermatitis, per pt.        REVIEW OF SYSTEMS:   5 point ROS negative except as noted above in HPI, including Gen., Resp., CV, GI &  system review.    PHYSICAL EXAM:   There were no vitals taken for this visit. Estimated body mass index is 23.64 kg/m  as calculated from the following:    Height as of 4/5/22: 1.668 m (5' 5.67\").    Weight as of 4/5/22: 65.8 kg (145 lb).   GENERAL APPEARANCE: alert, and oriented  MENTAL STATUS: alert  AIRWAY EXAM: Mallampatti Class I (visualization of the soft palate, fauces, uvula, anterior and posterior pillars)  RESP: lungs clear to auscultation - no rales, rhonchi or wheezes  CV: regular rates and rhythm  DIAGNOSTICS:    Not indicated    IMPRESSION   ASA Class 1 - Healthy patient, no medical problems    PLAN:   Plan for " Colonoscopy with possible biopsy, possible polypectomy. We discussed the risks, benefits and alternatives and the patient wished to proceed.    The above has been forwarded to the consulting provider.      Signed Electronically by: Boris Carlos MD  June 2, 2022

## 2022-06-02 NOTE — LETTER
May 17, 2022      Breonna Pinon   [id number]  [place] [address]        Dear Breonna,     Please be aware that coverage of these services is subject to the terms and limitations of your health insurance plan.  Call member services at your health plan with any benefit or coverage questions.    Thank you for choosing [facility]. You are scheduled for the following service(s):    Date:  6/2/2022             Procedure:  COLONOSCOPY  Doctor:        Dr. Boris Carlos   Arrival Time:  11:15     Enter and check in at the Main Hospital Entrance  Procedure Time:  12:00      Location:   [facility]        Endoscopy Department, First Floor         [address] [place], [place] [address]      [phone] or [phone] (Formerly [facility]) to reschedule        MIRALAX -GATORADE  PREP  Colonoscopy is the most accurate test to detect colon polyps and colon cancer; and the only test where polyps can be removed. During this procedure, a doctor examines the lining of your large intestine and rectum through a flexible tube.   Transportation  You must arrange for a ride for the day of your procedure with a responsible adult. A taxi , Uber, etc, is not an option unless you are accompanied by a responsible adult. If you fail to arrange transportation with a responsible adult, your procedure will be cancelled and rescheduled.    Purchase the  following supplies at your local pharmacy:  - 2 (two) bisacodyl tablets: each tablet contains 5 mg.  (Dulcolax  laxative NOT Dulcolax  stool softener)   - 1 (one) 8.3 oz bottle of Polyethylene Glycol (PEG) 3350 Powder   (MiraLAX , Smooth LAX , ClearLAX  or equivalent)  - 64 oz Gatorade    Regular Gatorade, Gatorade G2 , Powerade , Powerade Zero  or Pedialyte  is acceptable. Red colored flavors are not allowed; all other colors (yellow, green, orange, purple and blue) are okay. It is also okay to buy two 2.12 oz packets of powdered Gatorade that  can be mixed with water to a total volume of 64 oz of liquid.  - 1 (one) 10 oz bottle of Magnesium Citrate (Red colored flavors are not allowed)  It is also okay for you to use a 0.5 oz package of powdered magnesium citrate (17 g) mixed with 10 oz of water.      PREPARATION FOR COLONOSCOPY    7 days before:    Discontinue fiber supplements and medications containing iron. This includes Metamucil  and Fibercon ; and multivitamins with iron.    3 days before:    Begin a low-fiber diet. A low-fiber diet helps making the cleanout more effective.     Examples of a low-fiber diet include (but are not limited to): white bread, white rice, pasta, crackers, fish, chicken, eggs, ground beef, creamy peanut butter, cooked/steamed/boiled vegetables, canned fruit, bananas, melons, milk, plain yogurt cheese, salad dressing and other condiments.     The following are not allowed on a low-fiber diet: seeds, nuts, popcorn, bran, whole wheat, corn, quinoa, raw fruits and vegetables, berries and dried fruit, beans and lentils.    For additional details on low-fiber diet, please refer to the table on the last page.    2 days before:    Continue the low-fiber diet.     Drink at least 8 glasses of water throughout the day.     Stop eating solid foods at 11:45 pm.    1 day before:    In the morning: begin a clear liquid diet (liquids you can see through).     Examples of a clear liquid diet include: water, clear broth or bouillon, Gatorade, Pedialyte or Powerade, carbonated and non-carbonated soft drinks (Sprite , 7-Up , ginger ale), strained fruit juices without pulp (apple, white grape, white cranberry), Jell-O  and popsicles.     The following are not allowed on a clear liquid diet: red liquids, alcoholic beverages, dairy products (milk, creamer, and yogurt), protein shakes, creamy broths, juice with pulp and chewing tobacco.    At noon: take 2 (two) bisacodyl tablets     At 4 (and no later than 6pm): start drinking the Miralax-Gatorade  preparation (8.3 oz of Miralax mixed with 64 oz of Gatorade in a large pitcher). Drink 1(one) 8 oz glass every 15 minutes thereafter, until the mixture is gone.    COLON CLEANSING TIPS: drink adequate amounts of fluids before and after your colon cleansing to prevent dehydration. Stay near a toilet because you will have diarrhea. Even if you are sitting on the toilet, continue to drink the cleansing solution every 15 minutes. If you feel nauseous or vomit, rinse your mouth with water, take a 15 to 30-minute-break and then continue drinking the solution. You will be uncomfortable until the stool has flushed from your colon (in about 2 to 4 hours). You may feel chilled.    Day of your procedure  You may take your morning medications including blood pressure medications, methadone, anti-seizure medications with sips of water 3 hours prior to your procedure or earlier. Do not take insulin, blood thinners (unless specifically told by your primary care provider) or vitamins prior to your procedure. Continue the clear liquid diet.       4 hours prior: drink 10 oz of magnesium citrate. It may be easier to drink it with a straw.    STOP consuming all liquids after that.     Do not take anything by mouth during this time.     Allow extra time to travel to your procedure as you may need to stop and use a restroom along the way.    You are ready for the procedure, if you followed all instructions and your stool is no longer formed, but clear or yellow liquid. If you are unsure whether your colon is clean, please call our office at [phone] before you leave for your appointment.    Bring the following to your procedure:  - Insurance Card/Photo ID.   - List of current medications including over-the-counter medications and supplements.   - Your rescue inhaler if you currently use one to control asthma.    Canceling or rescheduling your appointment:   If you must cancel or reschedule your appointment, please call [phone]  as soon as possible.      COLONOSCOPY PRE-PROCEDURE CHECKLIST    If you have diabetes, ask your regular doctor for diet and medication restrictions.  If you take an anticoagulant or anti-platelet medication (such as Coumadin , Lovenox , Pradaxa , Xarelto , Eliquis , etc.), please call your primary doctor for advice on holding this medication.  If you take aspirin you may continue to do so.  If you are or may be pregnant, please discuss the risks and benefits of this procedure with your doctor.        What happens during a colonoscopy?    Plan to spend up to two hours, starting at registration time, at the endoscopy center the day of your procedure. The colonoscopy takes an average of 15 to 30 minutes. Recovery time is about 30 minutes.      Before the exam:    You will change into a gown.    Your medical history and medication list will be reviewed with you, unless that has been done over the phone prior to the procedure.     A nurse will insert an intravenous (IV) line into your hand or arm.    The doctor will meet with you and will give you a consent form to sign.  During the exam:     Medicine will be given through the IV line to help you relax.     Your heart rate and oxygen levels will be monitored. If your blood pressure is low, you may be given fluids through the IV line.     The doctor will insert a flexible hollow tube, called a colonoscope, into your rectum. The scope will be advanced slowly through the large intestine (colon).    You may have a feeling of fullness or pressure.     If an abnormal tissue or a polyp is found, the doctor may remove it through the endoscope for closer examination, or biopsy. Tissue removal is painless    After the exam:           Any tissue samples removed during the exam will be sent to a lab for evaluation. It may take 5-7 working days for you to be notified of the results.     A nurse will provide you with complete discharge instructions before you leave the endoscopy  center. Be sure to ask the nurse for specific instructions if you take blood thinners such as Aspirin, Coumadin or Plavix.     The doctor will prepare a full report for you and for the physician who referred you for the procedure.     Your doctor will talk with you about the initial results of your exam.      Medication given during the exam will prohibit you from driving for the rest of the day.     Following the exam, you may resume your normal diet. Your first meal should be light, no greasy foods. Avoid alcohol until the next day.     You may resume your regular activities the day after the procedure.         LOW-FIBER DIET    Foods RECOMMENDED Foods to AVOID   Breads, Cereal, Rice and Pasta:   White bread, rolls, biscuits, croissant and josiah toast.   Waffles, Frisian toast and pancakes.   White rice, noodles, pasta, macaroni and peeled cooked potatoes.   Plain crackers and saltines.   Cooked cereals: farina, cream of rice.   Cold cereals: Puffed Rice , Rice Krispies , Corn Flakes  and Special K    Breads, Cereal, Rice and Pasta:   Breads or rolls with nuts, seeds or fruit.   Whole wheat, pumpernickel, rye breads and cornbread.   Potatoes with skin, brown or wild rice, and kasha (buckwheat).     Vegetables:   Tender cooked and canned vegetables without seeds: carrots, asparagus tips, green or wax beans, pumpkin, spinach, lima beans. Vegetables:   Raw or steamed vegetables.   Vegetables with seeds.   Sauerkraut.   Winter squash, peas, broccoli, Brussel sprouts, cabbage, onions, cauliflower, baked beans, peas and corn.   Fruits:   Strained fruit juice.   Canned fruit, except pineapple.   Ripe bananas and melon. Fruits:   Prunes and prune juice.   Raw fruits.   Dried fruits: figs, dates and raisins.   Milk/Dairy:   Milk: plain or flavored.   Yogurt, custard and ice cream.   Cheese and cottage cheese Milk/Dairy:     Meat and other proteins:   ground, well-cooked tender beef, lamb, ham, veal, pork, fish, poultry and  organ meats.   Eggs.   Peanut butter without nuts. Meat and other proteins:   Tough, fibrous meats with gristle.   Dry beans, peas and lentils.   Peanut butter with nuts.   Tofu.   Fats, Snack, Sweets, Condiments and Beverages:   Margarine, butter, oils, mayonnaise, sour cream and salad dressing, plain gravy.   Sugar, hard candy, clear jelly, honey and syrup.   Spices, cooked herbs, bouillon, broth and soups made with allowed vegetable, ketchup and mustard.   Coffee, tea and carbonated drinks.   Plain cakes, cookies and pretzels.   Gelatin, plain puddings, custard, ice cream, sherbet and popsicles. Fats, Snack, Sweets, Condiments and Beverages:   Nuts, seeds and coconut.   Jam, marmalade and preserves.   Pickles, olives, relish and horseradish.   All desserts containing nuts, seeds, dried fruit and coconut; or made from whole grains or bran.   Candy made with nuts or seeds.   Popcorn.         DIRECTIONS TO THE ENDOSCOPY DEPARTMENT    From the north ([facility])  Take [address] South, exit on [address] [address]. Get into the left hand megan, turn left (east), go one-half mile to [address] and turn left. Go north to the second stoplight, take a right on [address] and follow it to the Main Hospital entrance.    From the south ([facility])  Take 35N to the 35E split and exit on [address] [address]. On [address] [address], turn left (west) to [address]. Turn right (north) on [address]. Go north to the second stoplight, take a right on [address] and follow it to the Main Hospital entrance.    From the east via 35E ([facility])  Take [address]E south to [address] [address] exit. Turn right on [address] [address]. Go west to [address]. Turn right (north) on [address]. Go to the second stoplight, take a right on [address] to the Main Hospital entrance.    From the east via [address] ([facility])  Take [address] to [address]. Turn left (south) on  [address] to [address], turn left (east) on [address] and follow it to the Main Hospital entrance.    From the west via [address] ([place])  Take [address] east to [address]. Turn right (south) on [address] to [address], turn left (east) on [address] and follow it to the Main Hospital entrance.

## 2022-06-06 NOTE — PROGRESS NOTES
Surgical Office Location :   [facility]  [address] [place] [address]

## 2022-06-07 ENCOUNTER — OFFICE VISIT (OUTPATIENT)
Dept: DERMATOLOGY | Facility: CLINIC | Age: 52
End: 2022-06-07
Payer: COMMERCIAL

## 2022-06-07 VITALS — HEART RATE: 80 BPM | SYSTOLIC BLOOD PRESSURE: 116 MMHG | DIASTOLIC BLOOD PRESSURE: 71 MMHG | OXYGEN SATURATION: 99 %

## 2022-06-07 DIAGNOSIS — D03.72 MELANOMA IN SITU OF LEFT LOWER LEG (H): ICD-10-CM

## 2022-06-07 DIAGNOSIS — D23.9 DERMAL NEVUS: ICD-10-CM

## 2022-06-07 DIAGNOSIS — D22.9 NEVUS: Primary | ICD-10-CM

## 2022-06-07 DIAGNOSIS — L81.4 LENTIGO: ICD-10-CM

## 2022-06-07 DIAGNOSIS — Z86.018 H/O DYSPLASTIC NEVUS: ICD-10-CM

## 2022-06-07 DIAGNOSIS — L82.1 SEBORRHEIC KERATOSIS: ICD-10-CM

## 2022-06-07 DIAGNOSIS — D18.01 ANGIOMA OF SKIN: ICD-10-CM

## 2022-06-07 PROCEDURE — 17313 MOHS 1 STAGE T/A/L: CPT | Performed by: DERMATOLOGY

## 2022-06-07 PROCEDURE — 99213 OFFICE O/P EST LOW 20 MIN: CPT | Mod: 25 | Performed by: DERMATOLOGY

## 2022-06-07 PROCEDURE — 88342 IMHCHEM/IMCYTCHM 1ST ANTB: CPT | Mod: 59 | Performed by: DERMATOLOGY

## 2022-06-07 NOTE — LETTER
2022         RE: Breonna Pinon  [address] [facility] [address]        Dear Colleague,    Thank you for referring your patient, Breonna Pinon, to the [facility]. Please see a copy of my visit note below.    Surgical Office Location :   [facility]  [facility] [place] [address]      Breonna Pinon , a 51 year old year old female patient, I was asked to see by Dr. 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  Today she notes spot on right shoulder.  Patient states this has been present for a while.  Patient reports the following symptoms:  none .  Patient reports the following previous treatments none.  Patient reports the following modifying factors none.  Associated symptoms: none.  Patient has no other skin complaints today.  Remainder of the HPI, Meds, PMH, Allergies, FH, and SH was reviewed in chart.      Past Medical History:   Diagnosis Date     Abnormal Pap smear      Allergic rhinitis, cause unspecified      Anemia      Fertility problem      Other acne        Past Surgical History:   Procedure Laterality Date     BIOPSY OF SKIN LESION  2014     COLONOSCOPY N/A 2022    Procedure: COLONOSCOPY (fv);  Surgeon: Boris Carlos MD;  Location:  GI     COLPOSCOPY,LOOP ELECTRD CERVIX EXCIS       D & C  12/29/10    suction D&C for missed      HC REMOVAL GALLBLADDER       HC REMOVE TONSILS/ADENOIDS,<11 Y/O       LAPAROSCOPY PROCEDURE UNLISTED  2010    removal of R ampullary ectopic pregnancy     LASIK          Family History   Problem Relation Age of Onset     Hypertension Mother      Alcohol/Drug Father      Alcohol/Drug Maternal Grandmother      Cancer Maternal Grandmother         lung/liver     Alcohol/Drug Maternal Grandfather      Cancer Maternal Grandfather         lung/liver     Cancer Paternal Grandmother         brain     Cancer Maternal Aunt         ovarian     Breast Cancer Maternal Aunt      Colon Cancer No family hx of         Social History     Socioeconomic History     Marital status:      Spouse name: Cheko     Number of children: 1     Years of education: Not on file     Highest education level: Not on file   Occupational History     Occupation: internal communications     Employer: [employer]     Comment: marketing management     Employer: [employer]   Tobacco Use     Smoking status: Never Smoker     Smokeless tobacco: Never Used   Substance and Sexual Activity     Alcohol use: Yes     Comment: rare     Drug use: No     Sexual activity: Yes     Partners: Male   Other Topics Concern     Parent/sibling w/ CABG, MI or angioplasty before 65F 55M? No   Social History Narrative     Not on file     Social Determinants of Health     Financial Resource Strain: Low Risk      Difficulty of Paying Living Expenses: Not hard at all   Food Insecurity: No Food Insecurity     Worried About Running Out of Food in the Last Year: Never true     Ran Out of Food in the Last Year: Never true   Transportation Needs: No Transportation Needs     Lack of Transportation (Medical): No     Lack of Transportation (Non-Medical): No   Physical Activity: Sufficiently Active     Days of Exercise per Week: 5 days     Minutes of Exercise per Session: 60 min   Stress: Stress Concern Present     Feeling of Stress : To some extent   Social Connections: Moderately Isolated     Frequency of Communication with Friends and Family: Once a week     Frequency of Social Gatherings with Friends and Family: Twice a week     Attends Episcopal Services: Never     Active Member of Clubs or Organizations: No     Attends Club or Organization Meetings: Not on file     Marital Status:    Intimate Partner Violence: Not on file   Housing Stability: Low Risk      Unable to Pay for Housing in the Last Year: No     Number of Places Lived in the Last Year: 1     Unstable Housing in the Last Year: No       Outpatient Encounter Medications as of 6/7/2022   Medication  Sig Dispense Refill     cetirizine (ZYRTEC) 10 MG tablet Take 1 tablet (10 mg) by mouth every evening 30 tablet 1     hydrOXYzine (ATARAX) 25 MG tablet Take 1-2 tablets (25-50 mg) by mouth every evening as needed for anxiety (or sleep) 100 tablet 4     lactase (LACTAID) 3000 UNIT tablet Take 3,000 Units by mouth 3 times daily (with meals)       Lactobacillus Acid-Pectin CAPS Take 1 capsule by mouth daily. 100 capsule prn     norgestimate-ethinyl estradiol (SPRINTEC 28) 0.25-35 MG-MCG tablet TAKE 1 TABLET BY MOUTH DAILY. TAKE ACTIVE TABLETS DAILY 112 tablet 1     No facility-administered encounter medications on file as of 6/7/2022.             Review Of Systems  Skin: As above  Eyes: negative  Ears/Nose/Throat: negative  Respiratory: No shortness of breath, dyspnea on exertion, cough, or hemoptysis  Cardiovascular: negative  Gastrointestinal: negative  Genitourinary: negative  Musculoskeletal: negative  Neurologic: negative  Psychiatric: negative  Hematologic/Lymphatic/Immunologic: negative  Endocrine: negative      O:   NAD, WDWN, Alert & Oriented, Mood & Affect wnl, Vitals stable   Here today alone   /71   Pulse 80   SpO2 99%    General appearance anna ii   Vitals stable   Alert, oriented and in no acute distress      Following lymph nodes palpated: popliteal no lad   L ant shin 1cm red plaque  pigmented macules on trunk and ext with regular borders and pigment networks  Stuck on papules and brown macules on trunk and ext   Red papules on trunk      Eyes: Conjunctivae/lids:Normal     ENT: Lips, buccal mucosa, tongue: normal    MSK:Normal    Cardiovascular: peripheral edema none    Pulm: Breathing Normal    Neuro/Psych: Orientation:Normal; Mood/Affect:Normal      A/P:  1. Seborrheic keratosis, lentigo, angioma, dermal nevus, nevi   2. Melanoma in situ   It was a pleasure speaking to Breonna Pinon today.  Previous clinic  notes and pertinent laboratory tests were reviewed prior to Breonna Pinon's  visit.  Nature of benign skin lesions dicussed with patient.  Patient encouraged to perform monthly skin exams.  UV precautions reviewed with patient.  Return to clinic 1 month  PROCEDURE NOTE  L shin melanoma in situ   MELANOMA DISCUSSED WITH PATIENT:  I discussed the specifics of tumor, prognosis, metachronous melanoma, self exam, and genetics with the patient. I explained the need for monthly skin exams including and taught the patient how to do this. Patient was asked about new or changing moles . I discussed with patient signs and symptoms that could arise in the setting of recurrent locoregional or metastatic disease. In addition, the need to undergo every 4 month dermatologic full skin survey and evaluation given that patients with a diagnosis of melanoma are at risk of recurrence (local and distant) and of subsequent de alina melanoma.  . I reviewed treatment options, including a discussion of wide excision (the gold standard) versus Mohs surgery with MART-1 immunostains.     Note: MART-1 (Melanoma Antigen Recognized by T-cells) antibody immunostaining was used during Mohs surgery as per standard protocol, in addition to routine processing of all specimens with hematoxylin and eosin. The peripheral margins/edges were processed with the MART-1 stain (1 specimens total). The center was examined with hematoxylin & eosin and MART-1 immunostains. The patient was informed of the procedure and its risk/benefits during the consent for the procedure.    One or more of the reagents used in immunohistochemical testing in this case may not have been cleared or approved by the U.S. Food and Drug Administration (FDA). The FDA has determined that such clearance or approval is not necessary. These tests are used for clinical purposes. They should not be regarded as investigational or for research. These reagents  performance characteristics have been determined by [facility]. This laboratory is certified  under the Clinical Laboratory Improvement Amendments of 1988 (CLIA-88) as qualified to perform high complexity clinical laboratory testing.      MOHS:   Aggressive histology    The rationale for Mohs surgery was discussed with the patient and consent was obtained.  The risks and benefits as well as alternatives to therapy were discussed, in detail.  Specifically, the risks of infection, scarring, bleeding, prolonged wound healing, incomplete removal, allergy to anesthesia, nerve injury and recurrence were addressed.  Indication for Mohs was Aggressive histology. Prior to the procedure, the treatment site was clearly identified and, if available, confirmed with previous photos and confirmed by the patient   All components of the Universal Protocol/PAUSE rule were completed.  The Mohs surgeon operated in two distinct and integrated capacities as the surgeon and pathologist.      The area was prepped with Betasept.  A rim of normal appearing skin was marked circumferentially around the lesion.  The area was infiltrated with local anesthesia.  The tumor was first debulked to remove all clinically apparent tumor.  An incision following the standard Mohs approach was done and the specimen was oriented,mapped and placed in 1 block(s).  Each specimen was then chromacoded and processed in the Mohs laboratory using standard Mohs technique and submitted for frozen section histology.  Frozen section analysis showed no residual tumor but CLEAR MARGINS.      The tumor was excised using standard Mohs technique in 1 stages(s).  MART 1 stains were performed on 1 specimens. CLEAR MARGINS OBTAINED and Final defect size was 1.6 cm.     We discussed the options for wound management in full with the patient including risks/benefits/ possible outcomes.        REPAIR SECOND INTENT: We discussed the options for wound management in full with the patient including risks/benefits/possible outcomes. Decision made to allow the wound to heal by  second intention. Cautery was used for for hemostasis. EBL minimal; complications none; wound care routine.  The patient was discharged in good condition and will return in one month or prn for wound evaluation.        Again, thank you for allowing me to participate in the care of your patient.        Sincerely,        Robert Man MD

## 2022-06-07 NOTE — PATIENT INSTRUCTIONS
Open Wound Care     for __Left lower leg____________        No strenuous activity for 48 hours    Take Tylenol as needed for discomfort.                                                .         Do not drink alcoholic beverages for 48 hours.    Keep the pressure bandage in place for 24 hours. If the bandage becomes blood tinged or loose, reinforce it with gauze and tape.        (Refer to the reverse side of this page for management of bleeding).    Remove bandage in 24 hours and begin wound care as follows:     Clean area with tap water using a Q tip or gauze pad, (shower / bathe normally)  Dry wound with Q tip or gauze pad  Apply Aquaphor, Vaseline, Polysporin or Bacitracin Ointment with a Q tip  Do NOT use Neosporin Ointment *  Cover the wound with a band-aid or nonstick gauze pad and paper tape.  Repeat wound care once a day until wound is completely healed.    It is an old wives tale that a wound heals better when it is exposed to air and allowed to dry out. The wound will heal faster with a better cosmetic result if it is kept moist with ointment and covered with a bandage.  Do not let the wound dry out.      Supplies Needed:                Qtips or gauze pads                Polysporin or Bacitracin Ointment                Bandaids or nonstick gauze pads and paper tape    Wound care kits and brown paper tape are available for purchase at   the pharmacy.    BLEEDING:    Use tightly rolled up gauze or cloth to apply direct pressure over the bandage for 20   minutes.  Reapply pressure for an additional 20 minutes if necessary  Call the office or go to the nearest emergency room if pressure fails to stop the bleeding.  Use additional gauze and tape to maintain pressure once the bleeding has stopped.  Begin wound care 24 hours after surgery as directed.                  WOUND HEALING    One week after surgery a pink / red halo will form around the outside of the wound.   This is new skin.  The center of the wound  will appear yellowish white and produce some drainage.  The pink halo will slowly migrate in toward the center of the wound until the wound is covered with new shiny pink skin.  There will be no more drainage when the wound is completely healed.  It will take six months to one year for the redness to fade.  The scar may be itchy, tight and sensitive to extreme temperatures for a year after the surgery.  Massaging the area several times a day for several minutes after the wound is completely healed will help the scar soften and normalize faster. Begin massage only after healing is complete.      In case of emergency call: Dr Man: [phone]    [facility]: [phone]    [facility][phone]

## 2022-06-07 NOTE — PROGRESS NOTES
Breonna Pinon , a 51 year old year old female patient, I was asked to see by Dr. 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  Today she notes spot on right shoulder.  Patient states this has been present for a while.  Patient reports the following symptoms:  none .  Patient reports the following previous treatments none.  Patient reports the following modifying factors none.  Associated symptoms: none.  Patient has no other skin complaints today.  Remainder of the HPI, Meds, PMH, Allergies, FH, and SH was reviewed in chart.      Past Medical History:   Diagnosis Date     Abnormal Pap smear      Allergic rhinitis, cause unspecified      Anemia      Fertility problem      Other acne        Past Surgical History:   Procedure Laterality Date     BIOPSY OF SKIN LESION  2014     COLONOSCOPY N/A 2022    Procedure: COLONOSCOPY (fv);  Surgeon: Boris Carlos MD;  Location:  GI     COLPOSCOPY,LOOP ELECTRD CERVIX EXCIS       D & C  12/29/10    suction D&C for missed      HC REMOVAL GALLBLADDER       HC REMOVE TONSILS/ADENOIDS,<11 Y/O       LAPAROSCOPY PROCEDURE UNLISTED  2010    removal of R ampullary ectopic pregnancy     LASIK          Family History   Problem Relation Age of Onset     Hypertension Mother      Alcohol/Drug Father      Alcohol/Drug Maternal Grandmother      Cancer Maternal Grandmother         lung/liver     Alcohol/Drug Maternal Grandfather      Cancer Maternal Grandfather         lung/liver     Cancer Paternal Grandmother         brain     Cancer Maternal Aunt         ovarian     Breast Cancer Maternal Aunt      Colon Cancer No family hx of        Social History     Socioeconomic History     Marital status:      Spouse name: Cheko     Number of children: 1     Years of education: Not on file     Highest education level: Not on file   Occupational History     Occupation: internal communications     Employer: [employer]     Comment: marketing management     Employer:  [employer]   Tobacco Use     Smoking status: Never Smoker     Smokeless tobacco: Never Used   Substance and Sexual Activity     Alcohol use: Yes     Comment: rare     Drug use: No     Sexual activity: Yes     Partners: Male   Other Topics Concern     Parent/sibling w/ CABG, MI or angioplasty before 65F 55M? No   Social History Narrative     Not on file     Social Determinants of Health     Financial Resource Strain: Low Risk      Difficulty of Paying Living Expenses: Not hard at all   Food Insecurity: No Food Insecurity     Worried About Running Out of Food in the Last Year: Never true     Ran Out of Food in the Last Year: Never true   Transportation Needs: No Transportation Needs     Lack of Transportation (Medical): No     Lack of Transportation (Non-Medical): No   Physical Activity: Sufficiently Active     Days of Exercise per Week: 5 days     Minutes of Exercise per Session: 60 min   Stress: Stress Concern Present     Feeling of Stress : To some extent   Social Connections: Moderately Isolated     Frequency of Communication with Friends and Family: Once a week     Frequency of Social Gatherings with Friends and Family: Twice a week     Attends Catholic Services: Never     Active Member of Clubs or Organizations: No     Attends Club or Organization Meetings: Not on file     Marital Status:    Intimate Partner Violence: Not on file   Housing Stability: Low Risk      Unable to Pay for Housing in the Last Year: No     Number of Places Lived in the Last Year: 1     Unstable Housing in the Last Year: No       Outpatient Encounter Medications as of 6/7/2022   Medication Sig Dispense Refill     cetirizine (ZYRTEC) 10 MG tablet Take 1 tablet (10 mg) by mouth every evening 30 tablet 1     hydrOXYzine (ATARAX) 25 MG tablet Take 1-2 tablets (25-50 mg) by mouth every evening as needed for anxiety (or sleep) 100 tablet 4     lactase (LACTAID) 3000 UNIT tablet Take 3,000 Units by mouth 3 times daily (with  meals)       Lactobacillus Acid-Pectin CAPS Take 1 capsule by mouth daily. 100 capsule prn     norgestimate-ethinyl estradiol (SPRINTEC 28) 0.25-35 MG-MCG tablet TAKE 1 TABLET BY MOUTH DAILY. TAKE ACTIVE TABLETS DAILY 112 tablet 1     No facility-administered encounter medications on file as of 6/7/2022.             Review Of Systems  Skin: As above  Eyes: negative  Ears/Nose/Throat: negative  Respiratory: No shortness of breath, dyspnea on exertion, cough, or hemoptysis  Cardiovascular: negative  Gastrointestinal: negative  Genitourinary: negative  Musculoskeletal: negative  Neurologic: negative  Psychiatric: negative  Hematologic/Lymphatic/Immunologic: negative  Endocrine: negative      O:   NAD, WDWN, Alert & Oriented, Mood & Affect wnl, Vitals stable   Here today alone   /71   Pulse 80   SpO2 99%    General appearance anna ii   Vitals stable   Alert, oriented and in no acute distress      Following lymph nodes palpated: popliteal no lad   L ant shin 1cm red plaque  pigmented macules on trunk and ext with regular borders and pigment networks  Stuck on papules and brown macules on trunk and ext   Red papules on trunk      Eyes: Conjunctivae/lids:Normal     ENT: Lips, buccal mucosa, tongue: normal    MSK:Normal    Cardiovascular: peripheral edema none    Pulm: Breathing Normal    Neuro/Psych: Orientation:Normal; Mood/Affect:Normal      A/P:  1. Seborrheic keratosis, lentigo, angioma, dermal nevus, nevi   2. Melanoma in situ   It was a pleasure speaking to Breonna Pinon today.  Previous clinic  notes and pertinent laboratory tests were reviewed prior to Breonna Pinon's visit.  Nature of benign skin lesions dicussed with patient.  Patient encouraged to perform monthly skin exams.  UV precautions reviewed with patient.  Return to clinic 1 month  PROCEDURE NOTE  L shin melanoma in situ   MELANOMA DISCUSSED WITH PATIENT:  I discussed the specifics of tumor, prognosis, metachronous melanoma, self exam, and  genetics with the patient. I explained the need for monthly skin exams including and taught the patient how to do this. Patient was asked about new or changing moles . I discussed with patient signs and symptoms that could arise in the setting of recurrent locoregional or metastatic disease. In addition, the need to undergo every 4 month dermatologic full skin survey and evaluation given that patients with a diagnosis of melanoma are at risk of recurrence (local and distant) and of subsequent de alina melanoma.  . I reviewed treatment options, including a discussion of wide excision (the gold standard) versus Mohs surgery with MART-1 immunostains.     Note: MART-1 (Melanoma Antigen Recognized by T-cells) antibody immunostaining was used during Mohs surgery as per standard protocol, in addition to routine processing of all specimens with hematoxylin and eosin. The peripheral margins/edges were processed with the MART-1 stain (1 specimens total). The center was examined with hematoxylin & eosin and MART-1 immunostains. The patient was informed of the procedure and its risk/benefits during the consent for the procedure.    One or more of the reagents used in immunohistochemical testing in this case may not have been cleared or approved by the U.S. Food and Drug Administration (FDA). The FDA has determined that such clearance or approval is not necessary. These tests are used for clinical purposes. They should not be regarded as investigational or for research. These reagents  performance characteristics have been determined by [facility]. This laboratory is certified under the Clinical Laboratory Improvement Amendments of 1988 (CLIA-88) as qualified to perform high complexity clinical laboratory testing.      MOHS:   Aggressive histology    The rationale for Mohs surgery was discussed with the patient and consent was obtained.  The risks and benefits as well as alternatives to therapy were discussed, in  detail.  Specifically, the risks of infection, scarring, bleeding, prolonged wound healing, incomplete removal, allergy to anesthesia, nerve injury and recurrence were addressed.  Indication for Mohs was Aggressive histology. Prior to the procedure, the treatment site was clearly identified and, if available, confirmed with previous photos and confirmed by the patient   All components of the Universal Protocol/PAUSE rule were completed.  The Mohs surgeon operated in two distinct and integrated capacities as the surgeon and pathologist.      The area was prepped with Betasept.  A rim of normal appearing skin was marked circumferentially around the lesion.  The area was infiltrated with local anesthesia.  The tumor was first debulked to remove all clinically apparent tumor.  An incision following the standard Mohs approach was done and the specimen was oriented,mapped and placed in 1 block(s).  Each specimen was then chromacoded and processed in the Mohs laboratory using standard Mohs technique and submitted for frozen section histology.  Frozen section analysis showed no residual tumor but CLEAR MARGINS.      The tumor was excised using standard Mohs technique in 1 stages(s).  MART 1 stains were performed on 1 specimens. CLEAR MARGINS OBTAINED and Final defect size was 1.6 cm.     We discussed the options for wound management in full with the patient including risks/benefits/ possible outcomes.        REPAIR SECOND INTENT: We discussed the options for wound management in full with the patient including risks/benefits/possible outcomes. Decision made to allow the wound to heal by second intention. Cautery was used for for hemostasis. EBL minimal; complications none; wound care routine.  The patient was discharged in good condition and will return in one month or prn for wound evaluation.

## 2022-06-07 NOTE — NURSING NOTE
"Initial /71   Pulse 80   SpO2 99%  Estimated body mass index is 23.64 kg/m  as calculated from the following:    Height as of 6/2/22: 1.668 m (5' 5.67\").    Weight as of 6/2/22: 65.8 kg (145 lb). .        "

## 2022-07-08 ENCOUNTER — VIRTUAL VISIT (OUTPATIENT)
Dept: ONCOLOGY | Facility: CLINIC | Age: 52
End: 2022-07-08
Attending: INTERNAL MEDICINE
Payer: COMMERCIAL

## 2022-07-08 VITALS — BODY MASS INDEX: 23.64 KG/M2 | WEIGHT: 145 LBS

## 2022-07-08 DIAGNOSIS — Z80.8 FAMILY HISTORY OF MALIGNANT NEOPLASM OF BRAIN: ICD-10-CM

## 2022-07-08 DIAGNOSIS — C43.9 MALIGNANT MELANOMA, UNSPECIFIED SITE (H): Primary | ICD-10-CM

## 2022-07-08 DIAGNOSIS — Z80.3 FAMILY HISTORY OF MALIGNANT NEOPLASM OF BREAST: ICD-10-CM

## 2022-07-08 PROCEDURE — 96040 HC GENETIC COUNSELING, EACH 30 MINUTES: CPT | Mod: GT,95 | Performed by: GENETIC COUNSELOR, MS

## 2022-07-08 ASSESSMENT — PAIN SCALES - GENERAL: PAINLEVEL: NO PAIN (0)

## 2022-07-08 NOTE — LETTER
Cancer Risk Management  Program Locations    [facility]  [facility]  [facility]  [facility]  Mailing Address  Cancer Risk Management Program  [address]  [place] [address]    New patient appointments  [phone]  2022    Breonna Pinon  [address] [address]      Dear Breonna,    It was a pleasure speaking with you over video on 2022. Here is a copy of the progress note from our discussion. If you have any additional questions, please feel free to call.    Referring Provider: Kaley Carolina MD    Presenting Information:   I met with Breonna for her video genetic counseling visit, through the Cancer Risk Management Program, to discuss her personal history of melanoma and family history of breast cancer. Today we reviewed this history, cancer screening recommendations, and available genetic testing options.    Personal History:  Breonna is a 51 year old year old female. She was diagnosed with melanoma at age 51; treatment included resection. She had her first menstrual period at age 16 or 17, her first child at age 41, and is perimenopausal. Breonna has her ovaries, fallopian tubes and uterus in place, and she has had no ovarian cancer screening to date. She reports that she has not used hormone replacement therapy.      She has annual clinical breast exams and mammograms; her most recent mammogram in May 2022 was normal. Breonna began having colonoscopies at the age of 51. Her most recent colonoscopy in 2022 was normal and follow-up was recommended in 10 years. She does not regularly do any other cancer screening at this time.    Family History: (Please see scanned pedigree for detailed family history information)    Breonna's paternal grandmother was diagnosed with and  from a brain tumor at age  79.    A maternal aunt was diagnosed with breast cancer at age 68. She is had negative genetic testing for only BRCA1 and BRCA2.    A maternal uncle was diagnosed with throat cancer in her late 50's. He is reported to have a history of smoking and heavy alcohol use.    Breonna's maternal grandmother  from lung cancer at age 48. She is reported to have a history of smoking and heavy alcohol use.    Breonna's maternal grandfather  from esophageal cancer in his mid-60's. He is reported to have a history of smoking and heavy alcohol use.    Her maternal ethnicity is Scandinavian. Her paternal ethnicity is Malay. There is no known Ashkenazi Adventism ancestry on either side of her family. There is no reported consanguinity.    Discussion:    Based upon her current personal and family history, Breonna is likely at low risk for a hereditary cancer syndrome.    We discussed the features commonly associated with hereditary cancer syndromes, and a handout regarding this was provided to Breonna today. This can be found in the after visit summary.    As discussed in our session, her family history is absent for the following features typically seen in high risk families:   Several people with the same or related types of cancer,  Cancers diagnosed at a young age (before age 50),  Individuals with more than one primary cancer,    Multiple generations of the family affected with cancer.      Because of the absence of these features, it is unlikely that there is a currently identifiable hereditary cancer syndrome present in Breonna's family.      We discussed that insurance coverage for genetic testing is dependent upon personal and family history being suggestive of a hereditary cancer syndrome. Based on her current family history and her maternal aunt's negative genetic testing, Breonna is likely at low risk for hereditary cancer.    Despite that, Breonna would still like to pursue genetic testing to better understand her personal  risk of hereditary cancer. We discussed that insurance will not cover her genetic testing and that she could pay out of pocket for this testing. Breonna was interested in that and genetic testing for a Custom Cancer panel was ordered for her.  Genetic testing is available for 51 genes associated with hereditary cancer: Custom Cancers panel (APC, DIONNE, AXIN2, BAP1, BARD1, BMPR1A, BRCA1, BRCA2, BRIP1, CDH1, CDK4, CDKN2A, CHEK2, CTNNA1, DICER1, EPCAM, GREM1, HOXB13, KIT, MEN1, MITF, MLH1, MSH2, MSH3, MSH6, MUTYH, NBN, NF1, NTHL1, PALB2, PDGFRA, PMS2, POLD1, POLE, POT1, PTEN, RAD50, RAD51C, RAD51D, RB1, SDHA, SDHB, SDHC, SDHD, SMAD4, SMARCA4, STK11, TP53, TSC1, TSC2, and VHL).   We discussed that many of the genes in the Custom Cancers panel are associated with specific hereditary cancer syndromes and published management guidelines: Hereditary Breast and Ovarian Cancer syndrome (BRCA1, BRCA2), Vegas syndrome (MLH1, MSH2, MSH6, PMS2, EPCAM), Familial Adenomatous Polyposis (APC), Hereditary Diffuse Gastric Cancer (CDH1), Familial Atypical Multiple Mole Melanoma syndrome (CDK4, CDKN2A), Juvenile Polyposis syndrome (BMPR1A, SMAD4), Cowden syndrome (PTEN), Li Fraumeni syndrome (TP53), Peutz-Jeghers syndrome (STK11), MUTYH Associated Polyposis (MUTYH), Tuberous Sclerosis complex (TSC1, TSC2), Neurofibromatosis type 1 (NF1), Multiple Endocrine Neoplasia type 1 (MEN1), Hereditary Paraganglioma and Pheochromocytoma (SDHA, SDHB, SDHC, SDHD), and von Hippel-Lindau (VHL).   The DIONNE, AXIN2, BRIP1, CHEK2, GREM1, MSH3, NBN, NTHL1, PALB2, POLD1, POLE, RAD51C, and RAD51D genes are associated with increased cancer risk and have published management guidelines for certain cancers.    The remaining genes (BAP1, BARD1, CTNNA1, DICER1, HOXB13, KIT, MITF, PDGFRA, POT1, RAD50, RB1, and SMARCA4) are associated with increased cancer risk and may allow us to make medical recommendations when mutations are identified.      Breonna would like to  submit a blood sample for her genetic testing. She will go to her [facility] at her earliest convenience to get her blood drawn for her genetic testing.     Verbal consent was given over video and written on the consent form. Turnaround time is approximately 4 weeks once the lab receives the sample.    Medical Management: For Breonna, we reviewed that the information from genetic testing may determine:    additional cancer screening for which Breonna may qualify (i.e. mammogram and breast MRI, more frequent colonoscopies, more frequent dermatologic exams, etc.),    options for risk reducing surgeries Breonna could consider (i.e. bilateral mastectomy, surgery to remove her ovaries and/or uterus, etc.),      and targeted chemotherapies if she were to develop certain cancers in the future (i.e. immunotherapy for individuals with Vegas syndrome, PARP inhibitors, etc.).     These recommendations and possible targeted chemotherapies will be discussed in detail once genetic testing is completed.     Plan:  1) Today Breonna elected to proceed with a Custom Cancer panel through [facility].  2) A copy of the consent form and the after visit summary will be mailed to Breonna.  3) This information should be available in approximately 4 weeks, once the lab receives the sample.  4) I will call Breonna with the results once they become available.    Time spent on video: 60 minutes    Darvin Cano MS, [facility] [place]ensed, Certified Genetic Counselor

## 2022-07-08 NOTE — PROGRESS NOTES
2022    Referring Provider: Kaley Carolina MD    Presenting Information:   I met with Breonna for her video genetic counseling visit, through the Cancer Risk Management Program, to discuss her personal history of melanoma and family history of breast cancer. Today we reviewed this history, cancer screening recommendations, and available genetic testing options.    Personal History:  Breonna is a 51 year old year old female. She was diagnosed with melanoma at age 51; treatment included resection. She had her first menstrual period at age 16 or 17, her first child at age 41, and is perimenopausal. Breonna has her ovaries, fallopian tubes and uterus in place, and she has had no ovarian cancer screening to date. She reports that she has not used hormone replacement therapy.      She has annual clinical breast exams and mammograms; her most recent mammogram in May 2022 was normal. Breonna began having colonoscopies at the age of 51. Her most recent colonoscopy in 2022 was normal and follow-up was recommended in 10 years. She does not regularly do any other cancer screening at this time.    Family History: (Please see scanned pedigree for detailed family history information)    Breonna's paternal grandmother was diagnosed with and  from a brain tumor at age 79.    A maternal aunt was diagnosed with breast cancer at age 68. She is had negative genetic testing for only BRCA1 and BRCA2.    A maternal uncle was diagnosed with throat cancer in her late 50's. He is reported to have a history of smoking and heavy alcohol use.    Breonna's maternal grandmother  from lung cancer at age 48. She is reported to have a history of smoking and heavy alcohol use.    Breonna's maternal grandfather  from esophageal cancer in his mid-60's. He is reported to have a history of smoking and heavy alcohol use.    Her maternal ethnicity is Scandinavian. Her paternal ethnicity is Lat[place]ian. There is no known Ashkenazi Denominational ancestry on  either side of her family. There is no reported consanguinity.    Discussion:    Based upon her current personal and family history, Breonna is likely at low risk for a hereditary cancer syndrome.    We discussed the features commonly associated with hereditary cancer syndromes, and a handout regarding this was provided to Breonna today. This can be found in the after visit summary.    As discussed in our session, her family history is absent for the following features typically seen in high risk families:   Several people with the same or related types of cancer,  Cancers diagnosed at a young age (before age 50),  Individuals with more than one primary cancer,    Multiple generations of the family affected with cancer.      Because of the absence of these features, it is unlikely that there is a currently identifiable hereditary cancer syndrome present in Breonna's family.      We discussed that insurance coverage for genetic testing is dependent upon personal and family history being suggestive of a hereditary cancer syndrome. Based on her current family history and her maternal aunt's negative genetic testing, Breonna is likely at low risk for hereditary cancer.    Despite that, Breonna would still like to pursue genetic testing to better understand her personal risk of hereditary cancer. We discussed that insurance will not cover her genetic testing and that she could pay out of pocket for this testing. Breonna was interested in that and genetic testing for a Custom Cancer panel was ordered for her.  Genetic testing is available for 51 genes associated with hereditary cancer: Custom Cancers panel (APC, DIONNE, AXIN2, BAP1, BARD1, BMPR1A, BRCA1, BRCA2, BRIP1, CDH1, CDK4, CDKN2A, CHEK2, CTNNA1, DICER1, EPCAM, GREM1, HOXB13, KIT, MEN1, MITF, MLH1, MSH2, MSH3, MSH6, MUTYH, NBN, NF1, NTHL1, PALB2, PDGFRA, PMS2, POLD1, POLE, POT1, PTEN, RAD50, RAD51C, RAD51D, RB1, SDHA, SDHB, SDHC, SDHD, SMAD4, SMARCA4, STK11, TP53, TSC1,  TSC2, and VHL).   We discussed that many of the genes in the Custom Cancers panel are associated with specific hereditary cancer syndromes and published management guidelines: Hereditary Breast and Ovarian Cancer syndrome (BRCA1, BRCA2), Vegas syndrome (MLH1, MSH2, MSH6, PMS2, EPCAM), Familial Adenomatous Polyposis (APC), Hereditary Diffuse Gastric Cancer (CDH1), Familial Atypical Multiple Mole Melanoma syndrome (CDK4, CDKN2A), Juvenile Polyposis syndrome (BMPR1A, SMAD4), Cowden syndrome (PTEN), Li Fraumeni syndrome (TP53), Peutz-Jeghers syndrome (STK11), MUTYH Associated Polyposis (MUTYH), Tuberous Sclerosis complex (TSC1, TSC2), Neurofibromatosis type 1 (NF1), Multiple Endocrine Neoplasia type 1 (MEN1), Hereditary Paraganglioma and Pheochromocytoma (SDHA, SDHB, SDHC, SDHD), and von Hippel-Lindau (VHL).   The DIONNE, AXIN2, BRIP1, CHEK2, GREM1, MSH3, NBN, NTHL1, PALB2, POLD1, POLE, RAD51C, and RAD51D genes are associated with increased cancer risk and have published management guidelines for certain cancers.    The remaining genes (BAP1, BARD1, CTNNA1, DICER1, HOXB13, KIT, MITF, PDGFRA, POT1, RAD50, RB1, and SMARCA4) are associated with increased cancer risk and may allow us to make medical recommendations when mutations are identified.      Breonna would like to submit a blood sample for her genetic testing. She will go to her nearest [facility] at her earliest convenience to get her blood drawn for her genetic testing.     Verbal consent was given over video and written on the consent form. Turnaround time is approximately 4 weeks once the lab receives the sample.    Medical Management: For Breonna, we reviewed that the information from genetic testing may determine:    additional cancer screening for which Breonna may qualify (i.e. mammogram and breast MRI, more frequent colonoscopies, more frequent dermatologic exams, etc.),    options for risk reducing surgeries Breonna could consider (i.e. bilateral  mastectomy, surgery to remove her ovaries and/or uterus, etc.),      and targeted chemotherapies if she were to develop certain cancers in the future (i.e. immunotherapy for individuals with Vegas syndrome, PARP inhibitors, etc.).     These recommendations and possible targeted chemotherapies will be discussed in detail once genetic testing is completed.     Plan:  1) Today Breonna elected to proceed with a Custom Cancer panel through [facility].  2) A copy of the consent form and the after visit summary will be mailed to Breonna.  3) This information should be available in approximately 4 weeks, once the lab receives the sample.  4) I will call Breonna with the results once they become available.    Time spent on video: 60 minutes    Darvin Cano MS, [facility]  Licensed, Certified Genetic Counselor      Breonna is a 51 year old who is being evaluated via a billable video visit.      How would you like to obtain your AVS? MyChart  If the video visit is dropped, the invitation should be resent by: Send to e-mail at: [email or web address]  Will anyone else be joining your video visit? No

## 2022-07-13 ENCOUNTER — LAB (OUTPATIENT)
Dept: LAB | Facility: CLINIC | Age: 52
End: 2022-07-13
Payer: COMMERCIAL

## 2022-07-13 DIAGNOSIS — Z80.3 FAMILY HISTORY OF MALIGNANT NEOPLASM OF BREAST: ICD-10-CM

## 2022-07-13 DIAGNOSIS — C43.9 MALIGNANT MELANOMA, UNSPECIFIED SITE (H): ICD-10-CM

## 2022-07-13 DIAGNOSIS — Z80.8 FAMILY HISTORY OF MALIGNANT NEOPLASM OF BRAIN: ICD-10-CM

## 2022-07-13 PROCEDURE — 36415 COLL VENOUS BLD VENIPUNCTURE: CPT

## 2022-07-13 PROCEDURE — 99000 SPECIMEN HANDLING OFFICE-LAB: CPT

## 2022-07-21 LAB — SCANNED LAB RESULT: NORMAL

## 2022-07-22 DIAGNOSIS — Z30.41 ENCOUNTER FOR SURVEILLANCE OF CONTRACEPTIVE PILLS: ICD-10-CM

## 2022-07-26 RX ORDER — NORGESTIMATE AND ETHINYL ESTRADIOL 0.25-0.035
KIT ORAL
Qty: 112 TABLET | Refills: 2 | Status: SHIPPED | OUTPATIENT
Start: 2022-07-26 | End: 2023-05-01

## 2022-08-01 ENCOUNTER — VIRTUAL VISIT (OUTPATIENT)
Dept: ONCOLOGY | Facility: CLINIC | Age: 52
End: 2022-08-01
Attending: GENETIC COUNSELOR, MS
Payer: COMMERCIAL

## 2022-08-01 DIAGNOSIS — Z80.8 FAMILY HISTORY OF MALIGNANT NEOPLASM OF BRAIN: ICD-10-CM

## 2022-08-01 DIAGNOSIS — Z80.3 FAMILY HISTORY OF MALIGNANT NEOPLASM OF BREAST: ICD-10-CM

## 2022-08-01 DIAGNOSIS — C43.9 MALIGNANT MELANOMA, UNSPECIFIED SITE (H): Primary | ICD-10-CM

## 2022-08-01 PROCEDURE — 999N000069 HC STATISTIC GENETIC COUNSELING, < 16 MIN: Mod: GT,95 | Performed by: GENETIC COUNSELOR, MS

## 2022-08-01 NOTE — PROGRESS NOTES
"8/1/2022    Referring Provider: Kaley Carolina MD    Presenting Information:  I spoke to Breonna by video today to discuss her genetic testing results. Her blood was drawn on 7/13/22. A Custom Cancer panel was ordered from [facility]. This testing was done because of Breonna's personal and family history of melanoma and breast cancer.    Genetic Testing Result: NEGATIVE  Breonna is negative for mutations in APC, DIONNE, AXIN2, BAP1, BARD1, BMPR1A, BRCA1, BRCA2, BRIP1, CDH1, CDK4, CDKN2A, CHEK2, CTNNA1, DICER1, EPCAM, GREM1, HOXB13, KIT, MEN1, MITF, MLH1, MSH2, MSH3, MSH6, MUTYH, NBN, NF1, NTHL1, PALB2, PDGFRA, PMS2, POLD1, POLE, POT1, PTEN, RAD50, RAD51C, RAD51D, RB1, SDHA, SDHB, SDHC, SDHD, SMAD4, SMARCA4, STK11, TP53, TSC1, TSC2, and VHL. No mutations were found in any of the 51 genes analyzed. This test involved sequencing and deletion/duplication analysis of all genes with the exceptions of EPCAM and GREM1 (deletions/duplications only), MITF (only the status of the c.952G>A (p.E318K) alteration is analyzed and reported), and SDHA (sequencing only).      A copy of the test report can be found in the Laboratory tab, dated 7/13/22, and named \"LABORATORY MISCELLANEOUS ORDER\". The report is scanned in as a linked document.    Interpretation:  We discussed several different interpretations of this negative test result.    1. One explanation may be that there is a different gene or combination of genes and environment that are associated with the cancers in this family.  2. It is possible that her maternal aunt with breast cancer did have a mutation in one of these genes and she did not inherit it.  3. There is also a small possibility that there is a mutation in one of these genes, and the testing laboratory could not find it with their current testing methods.       Screening:  Based on this negative test result, it is important for Breonna and her relatives to refer back to the family history for appropriate cancer screening.  "     Due to her personal history of melanoma, Breonna's close relatives remain at some increased risk for developing melanoma. According to the American Cancer Society, Breonna's relatives are encouraged to speak to her primary care provider about having regular skin exams and safe skin practices (i.e. sunscreen, self skin exams, limited sun exposure, etc.).    Based on her personal and family history, Breonna has a 12.8% lifetime risk of developing breast cancer based on the ERIC model. Therefore, Breonna does not meet current National Comprehensive Cancer Network (NCCN) guidelines for high risk breast screening, which is offered to women with a 20% lifetime risk or higher. However, it is still important for Breonna to continue with routine breast screening under the care of her physicians. Breast cancer screening is generally recommended to begin approximately 10 years younger than the earliest age of breast cancer diagnosis in the family, or at age 40, whichever comes first. In this family, screening may begin at age 40. Breonna is encouraged to discuss breast screening with her physicians.     Other population cancer screening options, such as those recommended by the American Cancer Society and the National Comprehensive Cancer Network (NCCN), are also appropriate for Breonna and her family. These screening recommendations may change if there are changes to Breonna's personal and/or family history of cancer. Final screening recommendations should be made by each individual's primary care provider.      Inheritance:  We reviewed autosomal dominant inheritance. We discussed that Breonna did not pass on an identifiable mutation in these genes to her children based on this test result. Mutations in these genes do not skip generations.      Additional Testing Considerations:  Although Breonna's genetic testing result was negative, other relatives may still carry a gene mutation associated with breast cancer and/or melanoma.  Genetic counseling is recommended for her maternal aunt with breast cancer to discuss genetic testing options. If she or any other relatives do pursue genetic testing, Breonna is encouraged to contact me so that we may review the impact of their test results on her.    Summary:  We do not have an explanation for Breonna's personal or family history of cancer. While no genetic changes were identified, Breonna may still be at risk for certain cancers due to family history, environmental factors, or other genetic causes not identified by this test. Because of that, it is important that she continue with cancer screening based on her personal and family history as discussed above.    Genetic testing is rapidly advancing, and new cancer susceptibility genes will most likely be identified in the future. Therefore, I encouraged Breonna to contact me annually or if there are changes in her personal or family history. This may change how we assess her cancer risk, screening, and the testing we would offer.    Plan:  1.  A copy of the test results will be mailed to Breonna.  2. She plans to follow-up with her other providers.  3. She should contact me regularly, or sooner if her family history changes.    If Breonna has any further questions, I encouraged her to contact me via [employer].    Time spent on video: 10 minutes.    Darvin Cano MS, [facility]  Licensed, Certified Genetic Counselor      Breonna is a 52 year old who is being evaluated via a billable video visit.      How would you like to obtain your AVS? Go[employer]f Accessorieshart  If the video visit is dropped, the invitation should be resent by: Text to cell phone: [phone]  Will anyone else be joining your video visit? Liliane TY

## 2022-08-01 NOTE — LETTER
"    Cancer Risk Management  Program [facility]  [facility]  [facility]  [facility]  Mailing Address  Cancer Risk Management Program  [address]  [place] [address]    New patient appointments  [phone]  August 1, 2022    Breonna Pinon  [address] [place] [id number]      Dear Breonna,    It was a pleasure speaking with you over video on 8/1/2022. Here is a copy of the progress note from our discussion. If you have any additional questions, please feel free to call.    Referring Provider: Kaley Carolina MD    Presenting Information:  I spoke to Breonna by video today to discuss her genetic testing results. Her blood was drawn on 7/13/22. A Custom Cancer panel was ordered from [facility]. This testing was done because of Breonna's personal and family history of melanoma and breast cancer.    Genetic Testing Result: NEGATIVE  Breonna is negative for mutations in APC, DIONNE, AXIN2, BAP1, BARD1, BMPR1A, BRCA1, BRCA2, BRIP1, CDH1, CDK4, CDKN2A, CHEK2, CTNNA1, DICER1, EPCAM, GREM1, HOXB13, KIT, MEN1, MITF, MLH1, MSH2, MSH3, MSH6, MUTYH, NBN, NF1, NTHL1, PALB2, PDGFRA, PMS2, POLD1, POLE, POT1, PTEN, RAD50, RAD51C, RAD51D, RB1, SDHA, SDHB, SDHC, SDHD, SMAD4, SMARCA4, STK11, TP53, TSC1, TSC2, and VHL. No mutations were found in any of the 51 genes analyzed. This test involved sequencing and deletion/duplication analysis of all genes with the exceptions of EPCAM and GREM1 (deletions/duplications only), MITF (only the status of the c.952G>A (p.E318K) alteration is analyzed and reported), and SDHA (sequencing only).      A copy of the test report can be found in the Laboratory tab, dated 7/13/22, and named \"LABORATORY MISCELLANEOUS ORDER\". The report is scanned in as a linked document.      Interpretation:  We discussed several " different interpretations of this negative test result.    1. One explanation may be that there is a different gene or combination of genes and environment that are associated with the cancers in this family.  2. It is possible that her maternal aunt with breast cancer did have a mutation in one of these genes and she did not inherit it.  3. There is also a small possibility that there is a mutation in one of these genes, and the testing laboratory could not find it with their current testing methods.       Screening:  Based on this negative test result, it is important for Breonna and her relatives to refer back to the family history for appropriate cancer screening.      Due to her personal history of melanoma, Breonna's close relatives remain at some increased risk for developing melanoma. According to the American Cancer Society, Breonna's relatives are encouraged to speak to her primary care provider about having regular skin exams and safe skin practices (i.e. sunscreen, self skin exams, limited sun exposure, etc.).    Based on her personal and family history, Breonna has a 12.8% lifetime risk of developing breast cancer based on the ERIC model. Therefore, Breonna does not meet current National Comprehensive Cancer Network (NCCN) guidelines for high risk breast screening, which is offered to women with a 20% lifetime risk or higher. However, it is still important for Breonna to continue with routine breast screening under the care of her physicians. Breast cancer screening is generally recommended to begin approximately 10 years younger than the earliest age of breast cancer diagnosis in the family, or at age 40, whichever comes first. In this family, screening may begin at age 40. Breonna is encouraged to discuss breast screening with her physicians.     Other population cancer screening options, such as those recommended by the American Cancer Society and the National Comprehensive Cancer Network (NCCN), are also  appropriate for Breonna and her family. These screening recommendations may change if there are changes to Breonna's personal and/or family history of cancer. Final screening recommendations should be made by each individual's primary care provider.      Inheritance:  We reviewed autosomal dominant inheritance. We discussed that Breonna did not pass on an identifiable mutation in these genes to her children based on this test result. Mutations in these genes do not skip generations.      Additional Testing Considerations:  Although Breonna's genetic testing result was negative, other relatives may still carry a gene mutation associated with breast cancer and/or melanoma. Genetic counseling is recommended for her maternal aunt with breast cancer to discuss genetic testing options. If she or any other relatives do pursue genetic testing, Breonna is encouraged to contact me so that we may review the impact of their test results on her.    Summary:  We do not have an explanation for Breonna's personal or family history of cancer. While no genetic changes were identified, Breonna may still be at risk for certain cancers due to family history, environmental factors, or other genetic causes not identified by this test. Because of that, it is important that she continue with cancer screening based on her personal and family history as discussed above.    Genetic testing is rapidly advancing, and new cancer susceptibility genes will most likely be identified in the future. Therefore, I encouraged Breonna to contact me annually or if there are changes in her personal or family history. This may change how we assess her cancer risk, screening, and the testing we would offer.    Plan:  1.  A copy of the test results will be mailed to Breonna.  2. She plans to follow-up with her other providers.  3. She should contact me regularly, or sooner if her family history changes.    If Breonna has any further questions, I encouraged her to  contact me via [employer].    Time spent on video: 10 minutes.    Darvin Cano MS, [facility]  Licensed, Certified Genetic Counselor

## 2022-08-01 NOTE — Clinical Note
"Hello,    Please enclose a copy of the test report from the laboratory tab titled \"laboratory miscellaneous order\" dated 7/13/22 (Order [id number]) to send to the patient along with the letter.    Referring provider: please see below for summary of genetic test results for your reference.    Thank you,  Darvin"

## 2022-08-01 NOTE — PATIENT INSTRUCTIONS
Assessing Cancer Risk  Only about 5-10% of cancers are thought to be due to an inherited cancer susceptibility gene.    These families often have:  Several people with the same or related types of cancer  Cancers diagnosed at a young age (before age 50)  Individuals with more than one primary cancer  Multiple generations of the family affected with cancer    Some people may be candidates for genetic testing of more than one gene.  For these families, genetic testing using a cancer panel may be offered.  These panels will test different genes known to increase the risk for breast, ovarian, uterine, and/or other cancers. All of the genes discussed below have published clinical management guidelines for individuals who are found to carry a mutation. The purpose of this handout is to serve as a brief summary of the genes analyzed by the panels used to inquire about hereditary breast and gynecologic cancer:  DIONNE, BRCA1, BRCA2, BRIP1, CDH1, CHEK2, MLH1, MSH2, MSH6, PMS2, EPCAM, PTEN, PALB2, RAD51C, RAD51D, and TP53.  ______________________________________________________________________________  Hereditary Breast and Ovarian Cancer Syndrome   (BRCA1 and BRCA2)  A single mutation in one of the copies of BRCA1 or BRCA2 increases the risk for breast and ovarian cancer, among others.  The risk for pancreatic cancer and melanoma may also be slightly increased in some families.  The chart below shows the chance that someone with a BRCA mutation would develop cancer in his or her lifetime1,2,3,4.        A person s ethnic background is also important to consider, as individuals of Ashkenazi Hindu ancestry have a higher chance of having a BRCA gene mutation.  There are three BRCA mutations that occur more frequently in this population.    Vegas Syndrome   (MLH1, MSH2, MSH6, PMS2, and EPCAM)  Currently five genes are known to cause Vegas Syndrome: MLH1, MSH2, MSH6, PMS2, and EPCAM.  A single mutation in one of the Vegas  Syndrome genes increases the risk for colon, endometrial, ovarian, and stomach cancers.  Other cancers that occur less commonly in Vegas Syndrome include urinary tract, skin, and brain cancers.  The chart below shows the chance that a person with Vegas syndrome would develop cancer in his or her lifetime5.      *Cancer risk varies depending on Vegas syndrome gene found    Cowden Syndrome   (PTEN)  Cowden syndrome is a hereditary condition that increases the risk for breast, thyroid, endometrial, colon, and kidney cancer.  Cowden syndrome is caused by a mutation in the PTEN gene.  A single mutation in one of the copies of PTEN causes Cowden syndrome and increases cancer risk.  The chart below shows the chance that someone with a PTEN mutation would develop cancer in their lifetime6,7.  Other benign features seen in some individuals with Cowden syndrome include benign skin lesions (facial papules, keratoses, lipomas), learning disability, autism, thyroid nodules, colon polyps, and larger head size.      *One recent study found breast cancer risk to be increased to 85%    Li-Fraumeni Syndrome   (TP53)  Li-Fraumeni Syndrome (LFS) is a cancer predisposition syndrome caused by a mutation in the TP53 gene. A single mutation in one of the copies of TP53 increases the risk for multiple cancers. Individuals with LFS are at an increased risk for developing cancer at a young age. The lifetime risk for development of a LFS-associated cancer is 50% by age 30 and 90% by age 60.   Core Cancers: Sarcomas, Breast, Brain, Lung, Leukemias/Lymphomas, Adrenocortical carcinomas  Other Cancers: Gastrointestinal, Thyroid, Skin, Genitourinary    Hereditary Diffuse Gastric Cancer   (CDH1)  Currently, one gene is known to cause hereditary diffuse gastric cancer (HDGC): CDH1.  Individuals with HDGC are at increased risk for diffuse gastric cancer and lobular breast cancer. Of people diagnosed with HDGC, 30-50% have a mutation in the CDH1 gene.   This suggests there are likely other genes that may cause HDGC that have not been identified yet.      Lifetime Cancer Risks    General Population HDGC    Diffuse Gastric  <1% ~80%   Breast 12% 39-52%         Additional Genes  DIONNE  DIONNE is a moderate-risk breast cancer gene. Women who have a mutation in DIONNE can have between a 2-4 fold increased risk for breast cancer compared to the general population8. DIONNE mutations have also been associated with increased risk for pancreatic cancer, however an estimate of this cancer risk is not well understood9. Individuals who inherit two DIONNE mutations have a condition called ataxia-telangiectasia (AT).  This rare autosomal recessive condition affects the nervous system and immune system, and is associated with progressive cerebellar ataxia beginning in childhood.  Individuals with ataxia-telangiectasia often have a weakened immune system and have an increased risk for childhood cancers.    PALB2  Mutations in PALB2 have been shown to increase the risk of breast cancer up to 33-58% in some families; where individuals fall within this risk range is dependent upon family [id number]. PALB2 mutations have also been associated with increased risk for pancreatic cancer, although this risk has not been quantified yet.  Individuals who inherit two PALB2 mutations--one from their mother and one from their father--have a condition called Fanconi Anemia.  This rare autosomal recessive condition is associated with short stature, developmental delay, bone marrow failure, and increased risk for childhood cancers.    CHEK2   CHEK2 is a moderate-risk breast cancer gene.  Women who have a mutation in CHEK2 have around a 2-fold increased risk for breast cancer compared to the general population, and this risk may be higher depending upon family history.11,12,13 Mutations in CHEK2 have also been shown to increase the risk of a number of other cancers, including colon and prostate, however these  cancer risks are currently not well understood.    BRIP1, RAD51C and RAD51D  Mutations in BRIP1, RAD51C, and RAD51D have been shown to increase the risk of ovarian cancer and possibly female breast cancer as well14,15 .       Lifetime Cancer Risk    General Population BRIP1 RAD51C RAD51D   Ovarian 1-2% ~5-8% ~5-9% ~7-15%           Inheritance  All of the cancer syndromes reviewed above are inherited in an autosomal dominant pattern.  This means that if a parent has a mutation, each of his or her children will have a 50% chance of inheriting that same mutation.  Therefore, each child--male or female--would have a 50% chance of being at increased risk for developing cancer.      Image obtained from Genetics Home Reference, 2013     Mutations in some genes can occur de alina, which means that a person s mutation occurred for the first time in them and was not inherited from a parent.  Now that they have the mutation, however, it can be passed on to future generations.    Genetic Testing  Genetic testing involves a blood test and will look at the genetic information in the DIONNE, BRCA1, BRCA2, BRIP1, CDH1, CHEK2, MLH1, MSH2, MSH6, PMS2, EPCAM, PTEN, PALB2, RAD51C, RAD51D, and TP53 genes for any harmful mutations that are associated with increased cancer risk.  If possible, it is recommended that the person(s) who has had cancer be tested before other family members.  That person will give us the most useful information about whether or not a specific gene is associated with the cancer in the family.    Results  There are three possible results of genetic testing:  Positive--a harmful mutation was identified in one or more of the genes  Negative--no mutation was identified in any of the genes on this panel  Variant of unknown significance--a variation in one of the genes was identified, but it is unclear how this impacts cancer risk in the family    Advantages and Disadvantages   There are advantages and disadvantages to  genetic testing.    Advantages  May clarify your cancer risk  Can help you make medical decisions  May explain the cancers in your family  May give useful information to your family members (if you share your results)    Disadvantages  Possible negative emotional impact of learning about inherited cancer risk  Uncertainty in interpreting a negative test result in some situations  Possible genetic discrimination concerns (see below)    Genetic Information Nondiscrimination Act (ASAD)  ASAD is a federal law that protects individuals from health insurance or employment discrimination based on a genetic test result alone.  Although rare, there are currently no legal discrimination protections in terms of life insurance, long term care, or disability insurances.  Visit the [employer] [place] website to learn more.    Reducing Cancer Risk  All of the genes described above have nationally recognized cancer screening guidelines that would be recommended for individuals who test positive.  In addition to increased cancer screening, surgeries may be offered or recommended to reduce cancer risk.  Recommendations are based upon an individual s genetic test result as well as their personal and family history of cancer.    Questions to Think About Regarding Genetic Testing:  What effect will the test result have on me and my relationship with my family members if I have an inherited gene mutation?  If I don t have a gene mutation?  Should I share my test results, and how will my family react to this news, which may also affect them?  Are my children ready to learn new information that may one day affect their own health?    Hereditary Cancer Resources    FORCE: Facing Our Risk of Cancer Empowered facingourrisk.org   Bright Pink bebrightpink.org   Li-Fraumeni Syndrome Association lfsassociation.org   PTEN World PTENworld.allGreenup   No stomach for cancer, Inc. nostomachforcancer.org   Stomach cancer relief network  Scrnet.org   Collaborative Group of the Americas on Inherited Colorectal Cancer (CGA) cgaicc.com    Cancer Care cancercare.org   American Cancer Society (ACS) cancer.org   National Cancer [place] (NCI) cancer.gov     Please call us if you have any questions or concerns.   Cancer Risk Management Program [phone] ([phone])  Darvin Cano, MS [facility] [phone]  Bertha Chaka, MS, [facility] [phone]  Kylie Bales, MS, [facility]  [phone]  Risa Valencia, MS, [facility]  [phone]  Umu Phillip, MS, [facility]  [phone]  Meagan Gonzalez, MS, [facility] [phone]  Mikala Campbell, MS, [facility] [phone]    References  Chantell Bonner PDP, Nayely S, Shireen MCINTOSH, Alexa JE, Pinky JL, Ramandeep N, Wali H, Stephanie O, Mery A, Kimber B, Louis P, Mantracey S, Cliff DM, Juan Antonio N, Pricila E, Jia H, Alan E, Lubinski J, Gronwald J, Jane B, Michael H, Thorlacius S, Eerola H, Karen H, Michael K, Ashkan OP. Average risks of breast and ovarian cancer associated with BRCA1 or BRCA2 mutations detected in case series unselected for family history: a combined analysis of 222 studies. Am J Hum Crystal. 2003;72:1117-30.  Meron N, Benita M, Mounika G.  BRCA1 and BRCA2 Hereditary Breast and Ovarian Cancer. Gene Reviews online. 2013.  Jonny YC, Annetta S, Prince G, Payne S. Breast cancer risk among male BRCA1 and BRCA2 mutation carriers. J Natl Cancer Inst. 2007;99:1811-4.  Dyllan MORTON, Aj I, Orlando J, Jono E, Amandeep ER, Nedra F. Risk of breast cancer in male BRCA2 carriers. J Med Crystal. 2010;47:710-1.  National Comprehensive Cancer Network. Clinical practice guidelines in oncology, colorectal cancer screening. Available online (registration required). 2015.  Homero VELÁZQUEZ, Los J, Lloyd J, Irina LA, Dejon MS, Eng C. Lifetime cancer risks in individuals with germline PTEN mutations. Clin Cancer Res. 2012;18:400-7.  Avelino ARCE. Cowden Syndrome: A Critical Review of the Clinical Literature. J Crystal .  2009:18:13-27.  Danika A, Hao D, Yosef S, Jayshree P, Brad T, Marcelo M, Cosme B, Jay Jay H, Dorothy R, Bobo K, Richard L, Dyllan DG, Cliff D, Immanuel DF, Trina MR, The Breast Cancer Susceptibility Collaboration () & Cait BLOOD. DIONNE mutations that cause ataxia-telangiectasia are breast cancer susceptibility alleles. Nature Genetics. 2006;38:873-875  Morris N , Joey Y, Sarah J, Asher L, Humberto GM , Kulwant ML, Gallinger S, Tee AG, Syngal S, Nayan ML, Sal J , Hue R, Huong SZ, Esperanza JR, Paul VE, Damari M, Vorichmond B, Riana N, Taylor RH, Elton KW, and Roselyn AP. DIONNE mutations in patients with hereditary pancreatic cancer. Cancer Discover. 2012;2:41-46  Edgar BOYLE., et al. Breast-Cancer Risk in Families with Mutations in PALB2. NEJM. 2014; 371(6):497-506.  CHEK2 Breast Cancer Case-Control Consortium. CHEK2*1100delC and susceptibility to breast cancer: A collaborative analysis involving 10,860 breast cancer cases and 9,065 controls from 10 studies. Am J Hum Crystal, 74 (2004), pp. 8055-0465  Molly T, Dilma S, Gerardo K, et al. Spectrum of Mutations in BRCA1, BRCA2, CHEK2, and TP53 in Families at High Risk of Breast Cancer. ERA. 2006;295(12):0534-9607.   Miles C, Nils D, Cristian A, et al. Risk of breast cancer in women with a CHEK2 mutation with and without a family history of breast cancer. J Clin Oncol. 2011;29:1658-4110.  Augusto H, Jose E, Alia SJ, et al. Contribution of germline mutations in the RAD51B, RAD51C, and RAD51D genes to ovarian cancer in the population. J Clin Oncol. 2015;33(26):3170-9677. Doi:10.1200/JCO.2015.61.2408.  Alan Dangon DF, Layne P, et al. Mutations in BRIP1 confer high risk of ovarian cancer. Kaia Crystal. 2011;43(11):6316-7465. doi:10.1038/ng.955.

## 2022-09-06 ENCOUNTER — OFFICE VISIT (OUTPATIENT)
Dept: DERMATOLOGY | Facility: CLINIC | Age: 52
End: 2022-09-06
Payer: COMMERCIAL

## 2022-09-06 DIAGNOSIS — L81.4 LENTIGO: ICD-10-CM

## 2022-09-06 DIAGNOSIS — L90.0 LICHEN SCLEROSUS: Primary | ICD-10-CM

## 2022-09-06 DIAGNOSIS — D03.72 MELANOMA IN SITU OF LEFT LOWER LEG (H): ICD-10-CM

## 2022-09-06 DIAGNOSIS — L71.0 PERIORAL DERMATITIS: ICD-10-CM

## 2022-09-06 PROCEDURE — 99214 OFFICE O/P EST MOD 30 MIN: CPT | Performed by: DERMATOLOGY

## 2022-09-06 RX ORDER — CLOBETASOL PROPIONATE 0.5 MG/G
OINTMENT TOPICAL
Qty: 30 G | Refills: 4 | Status: SHIPPED | OUTPATIENT
Start: 2022-09-06

## 2022-09-06 RX ORDER — DOXYCYCLINE 100 MG/1
100 TABLET ORAL 2 TIMES DAILY
Qty: 60 TABLET | Refills: 2 | Status: SHIPPED | OUTPATIENT
Start: 2022-09-06 | End: 2023-07-21

## 2022-09-06 RX ORDER — TACROLIMUS 1 MG/G
OINTMENT TOPICAL
Qty: 30 G | Refills: 6 | Status: SHIPPED | OUTPATIENT
Start: 2022-09-06

## 2022-09-06 RX ORDER — HYDROQUINONE 40 MG/G
CREAM TOPICAL
Qty: 28 G | Refills: 1 | Status: SHIPPED | OUTPATIENT
Start: 2022-09-06

## 2022-09-06 NOTE — PATIENT INSTRUCTIONS
Pediatric Dermatology  [facility]  [address]  1st [place]  [address]  Phone: [phone]    Pediatric & Adult Dermatology  [facility]  [address] [facility]   2nd Floor  [facility] [address]  [phone]                  General information: Dr. Abeba Restrepo is a board-certified dermatologist with subspecialty certification in pediatric dermatology.     Scheduling and Nurse Triage: Dr. Restrepo sees pediatric patients on Mondays in [place] and adult and pediatric patients on Tuesdays in [place]. The remainder of the week she practices at the [facility]. Please call the above phone numbers to schedule or to talk to a nurse.     -For scheduling at the [place] or [place] locations, or to talk to the triage nurse please call the above phone number at the clinic where you were seen.     -For medication refills, please call your pharmacy.

## 2022-09-06 NOTE — LETTER
9/6/2022      RE: Breonna Pinon  [address]  [place] [address]     Dear Colleague,    Thank you for the opportunity to participate in the care of your patient, Breonna Pinon, at the [facility]. Please see a copy of my visit note below.    Dermatology Problem List:  #. Melanoma in situ - L lower leg, excited in 6/22  #. Dysplastic nevus L chest, moderate  #. Lichen sclerosus et atrophicus diagnosed by biopsy in the patient's 20's.  Initially treated with testosterone and then topical corticosteroids and finally Protopic.  Subsequently followed by Dr. Higgins and Dr. Carolina.  Currently using diaper paste to the areas as primary treatment.   #.  Tinea pedis.   #.  Onychomycosis.   #.  History of dysplastic nevus.   #.  Benign pigmented nevi.   #.  Keratosis pilaris.   #.  Perioral dermatitis    Assessment and Plan:    1. Lichen sclerosus  Evidence of activity with adhesions of the labia minora. I recommended clobetasol ointment, but patient previously with perioral dermatitis with use of topical steroids even in the genitals. Will send prescription for both clobetasol ointment and tacrolimus. Use clobetasol BIW or protopic daily.   - tacrolimus (PROTOPIC) 0.1 % external ointment; To genital area daily  Dispense: 30 g; Refill: 6  - clobetasol (TEMOVATE) 0.05 % external ointment; To genital area twice weekly  Dispense: 30 g; Refill: 4    2. Perioral dermatitis  Rx sent for doxy in the event that perioral develops with clobetasol.   - doxycycline monohydrate (ADOXA) 100 MG tablet; Take 1 tablet (100 mg) by mouth 2 times daily Do not fill until patient calls  Dispense: 60 tablet; Refill: 2    3. Lentigines  Aggressive sun protection.   - hydroquinone (SARAI) 4 % external cream; Apply to face nightly for up to 16 weeks.  Dispense: 28 g; Refill: 1    4. MIS: No recurrences. Sun protection.     5. Benign pigmented nevi: No lesions of  concern. Photo of nevi on the back.     RTC 6 months.     Thank you for involving me in this patient's care.     Abeba Restrepo MD   of Dermatology  [facility]    CC: Referred MD Chucky  No address on file    Kaley Carolina MD    ____________________________________________________________________________________________________________________________________________    CC: Patient presents with:  RECHECK: Annual Skin Exam current concerns are to check are facial hyperpigmentation, right shoulder blade and check left shin and chest were spots were removed        HPI: Breonna Pinon is a 52 year old female presenting for evaluation of several concerns. She was recently diagnosed with melanoma in situ on the leg. She is s/p excision. No other worrisome lesions. Requests evaluation for facial freckling and for lichen sclerosus. Occasionally uses almond or aloe to the area. Notes that with use of the clobetasol she had perioral dermatitis.       Patient Active Problem List   Diagnosis     Allergic rhinitis     CARDIOVASCULAR SCREENING; LDL GOAL LESS THAN 160     Lichen sclerosus et atrophicus     Lactose intolerance     H/O dysplastic nevus     Perioral dermatitis       Allergies   Allergen Reactions     Gluten      intolerance in foods     Lactose      intolerance, dairy foods     Seasonal Allergies      Cortisone Dermatitis      When pt takes cortisone, causes perioral dermatitis, per pt.         Current Outpatient Medications   Medication     cetirizine (ZYRTEC) 10 MG tablet     clobetasol (TEMOVATE) 0.05 % external ointment     doxycycline monohydrate (ADOXA) 100 MG tablet     hydroquinone (SARAI) 4 % external cream     hydrOXYzine (ATARAX) 25 MG tablet     lactase (LACTAID) 3000 UNIT tablet     Lactobacillus Acid-Pectin CAPS     norgestimate-ethinyl estradiol (SPRINTEC 28) 0.25-35 MG-MCG tablet     tacrolimus (PROTOPIC) 0.1 % external ointment     No current  facility-administered medications for this visit.       Family History   Problem Relation Age of Onset     Hypertension Mother      Alcohol/Drug Father      Alcohol/Drug Maternal Grandmother      Cancer Maternal Grandmother         lung/liver     Alcohol/Drug Maternal Grandfather      Cancer Maternal Grandfather         lung/liver     Cancer Paternal Grandmother         brain     Breast Cancer Maternal Aunt      Colon Cancer No family hx of        Social History     Tobacco Use     Smoking status: Never Smoker     Smokeless tobacco: Never Used   Substance Use Topics     Alcohol use: Yes     Comment: rare     Drug use: No         ROS: Patient in normal state of health and is feeling well on complete ROS.     EXAM:  There were no vitals taken for this visit.  GEN: Alert, no distress  NEURO: A/O x3  SKIN: Full body exam with genitals  --Light brown macules diffusely on the cheeks, forhead  --Circular atropic patch on the L mid chest 1 cm  --Circular brown pink atopic patch on the L anterior shin about 2 cm, no pigment  --R mid posterior shoulder with an approx 5 mm brown pink macule  --Scattered medium brown macules on the back, chest, arms, legs  --R posterior heel and plantar foot with light brown macules  --Fusion of the labia minora to the labia majora. Unable to retract clitoral mendes    Please do not hesitate to contact me if you have any questions/concerns.     Sincerely,       Abbea Restrepo MD

## 2022-09-06 NOTE — PROGRESS NOTES
Dermatology Problem List:  #. Melanoma in situ - L lower leg, excited in 6/22  #. Dysplastic nevus L chest, moderate  #. Lichen sclerosus et atrophicus diagnosed by biopsy in the patient's 20's.  Initially treated with testosterone and then topical corticosteroids and finally Protopic.  Subsequently followed by Dr. Higgins and Dr. Carolina.  Currently using diaper paste to the areas as primary treatment.   #.  Tinea pedis.   #.  Onychomycosis.   #.  History of dysplastic nevus.   #.  Benign pigmented nevi.   #.  Keratosis pilaris.   #.  Perioral dermatitis    Assessment and Plan:    1. Lichen sclerosus  Evidence of activity with adhesions of the labia minora. I recommended clobetasol ointment, but patient previously with perioral dermatitis with use of topical steroids even in the genitals. Will send prescription for both clobetasol ointment and tacrolimus. Use clobetasol BIW or protopic daily.   - tacrolimus (PROTOPIC) 0.1 % external ointment; To genital area daily  Dispense: 30 g; Refill: 6  - clobetasol (TEMOVATE) 0.05 % external ointment; To genital area twice weekly  Dispense: 30 g; Refill: 4    2. Perioral dermatitis  Rx sent for doxy in the event that perioral develops with clobetasol.   - doxycycline monohydrate (ADOXA) 100 MG tablet; Take 1 tablet (100 mg) by mouth 2 times daily Do not fill until patient calls  Dispense: 60 tablet; Refill: 2    3. Lentigines  Aggressive sun protection.   - hydroquinone (SARAI) 4 % external cream; Apply to face nightly for up to 16 weeks.  Dispense: 28 g; Refill: 1    4. MIS: No recurrences. Sun protection.     5. Benign pigmented nevi: No lesions of concern. Photo of nevi on the back.     RTC 6 months.     Thank you for involving me in this patient's care.     Abeba Restrepo MD   of Dermatology  [facility]    CC: Referred Self, MD  No address on file    Kaley Carolina  MD    ____________________________________________________________________________________________________________________________________________    CC: Patient presents with:  RECHECK: Annual Skin Exam current concerns are to check are facial hyperpigmentation, right shoulder blade and check left shin and chest were spots were removed        HPI: Breonna Pinon is a 52 year old female presenting for evaluation of several concerns. She was recently diagnosed with melanoma in situ on the leg. She is s/p excision. No other worrisome lesions. Requests evaluation for facial freckling and for lichen sclerosus. Occasionally uses almond or aloe to the area. Notes that with use of the clobetasol she had perioral dermatitis.       Patient Active Problem List   Diagnosis     Allergic rhinitis     CARDIOVASCULAR SCREENING; LDL GOAL LESS THAN 160     Lichen sclerosus et atrophicus     Lactose intolerance     H/O dysplastic nevus     Perioral dermatitis       Allergies   Allergen Reactions     Gluten      intolerance in foods     Lactose      intolerance, dairy foods     Seasonal Allergies      Cortisone Dermatitis      When pt takes cortisone, causes perioral dermatitis, per pt.         Current Outpatient Medications   Medication     cetirizine (ZYRTEC) 10 MG tablet     clobetasol (TEMOVATE) 0.05 % external ointment     doxycycline monohydrate (ADOXA) 100 MG tablet     hydroquinone (SARAI) 4 % external cream     hydrOXYzine (ATARAX) 25 MG tablet     lactase (LACTAID) 3000 UNIT tablet     Lactobacillus Acid-Pectin CAPS     norgestimate-ethinyl estradiol (SPRINTEC 28) 0.25-35 MG-MCG tablet     tacrolimus (PROTOPIC) 0.1 % external ointment     No current facility-administered medications for this visit.       Family History   Problem Relation Age of Onset     Hypertension Mother      Alcohol/Drug Father      Alcohol/Drug Maternal Grandmother      Cancer Maternal Grandmother         lung/liver     Alcohol/Drug Maternal  Grandfather      Cancer Maternal Grandfather         lung/liver     Cancer Paternal Grandmother         brain     Breast Cancer Maternal Aunt      Colon Cancer No family hx of        Social History     Tobacco Use     Smoking status: Never Smoker     Smokeless tobacco: Never Used   Substance Use Topics     Alcohol use: Yes     Comment: rare     Drug use: No         ROS: Patient in normal state of health and is feeling well on complete ROS.     EXAM:  There were no vitals taken for this visit.  GEN: Alert, no distress  NEURO: A/O x3  SKIN: Full body exam with genitals  --Light brown macules diffusely on the cheeks, forhead  --Circular atropic patch on the L mid chest 1 cm  --Circular brown pink atopic patch on the L anterior shin about 2 cm, no pigment  --R mid posterior shoulder with an approx 5 mm brown pink macule  --Scattered medium brown macules on the back, chest, arms, legs  --R posterior heel and plantar foot with light brown macules  --Fusion of the labia minora to the labia majora. Unable to retract clitoral mendes

## 2022-09-29 ENCOUNTER — TRANSFERRED RECORDS (OUTPATIENT)
Dept: HEALTH INFORMATION MANAGEMENT | Facility: CLINIC | Age: 52
End: 2022-09-29

## 2022-10-05 ENCOUNTER — E-VISIT (OUTPATIENT)
Dept: PEDIATRICS | Facility: CLINIC | Age: 52
End: 2022-10-05
Payer: COMMERCIAL

## 2022-10-05 DIAGNOSIS — F51.01 PRIMARY INSOMNIA: Primary | ICD-10-CM

## 2022-10-05 DIAGNOSIS — F51.01 PRIMARY INSOMNIA: ICD-10-CM

## 2022-10-05 PROCEDURE — 99422 OL DIG E/M SVC 11-20 MIN: CPT | Performed by: INTERNAL MEDICINE

## 2022-10-05 RX ORDER — DOXEPIN 3 MG/1
3 TABLET, FILM COATED ORAL
Qty: 30 TABLET | Refills: 5 | Status: SHIPPED | OUTPATIENT
Start: 2022-10-05 | End: 2022-10-12

## 2022-10-05 NOTE — TELEPHONE ENCOUNTER
Provider E-Visit time total (minutes): 12 mins in medication review in uptodate and patient instructions

## 2022-10-06 ENCOUNTER — TELEPHONE (OUTPATIENT)
Dept: PEDIATRICS | Facility: CLINIC | Age: 52
End: 2022-10-06

## 2022-10-06 NOTE — TELEPHONE ENCOUNTER
Prior Authorization Retail Medication Request    Medication/Dose: Doxepin 3mg  ICD code (if different than what is on RX):    Previously Tried and Failed:    Rationale: Has failed hydroxyzine and amitriptyline.  Has sleep maintenance insomnia.     Insurance Name:    Insurance ID:        Pharmacy Information (if different than what is on RX)  Name:  [facility]  Phone:  [phone]

## 2022-10-07 NOTE — TELEPHONE ENCOUNTER
Prior Authorization Approval    Authorization Effective Date: 9/7/2022  Authorization Expiration Date: 10/7/2023  Medication: Doxepin 3mg PA APPROVED  Approved Dose/Quantity: 30  Reference #:     Insurance Company: [employer] Phone [phone] Fax [phone]  Expected CoPay:       CoPay Card Available:      Foundation Assistance Needed:    Which Pharmacy is filling the prescription (Not needed for infusion/clinic administered): [id number] - [place] - [address] AT [facility]  Pharmacy Notified: Yes  Patient Notified: Yes          PA Initiation    Medication: Doxepin 3mg PA INITIATED  Insurance Company: [employer] Phone [phone] Fax [phone]  Pharmacy Filling the Rx: [id number] - [place] - [address] AT [facility]  Filling Pharmacy Phone: [phone]  Filling Pharmacy Fax:    Start Date: 10/7/2022    Central Prior Authorization Team   Phone: [phone]

## 2022-10-12 RX ORDER — DOXEPIN 3 MG/1
3 TABLET, FILM COATED ORAL
Qty: 30 TABLET | Refills: 5 | Status: SHIPPED | OUTPATIENT
Start: 2022-10-12 | End: 2022-10-12

## 2022-10-12 RX ORDER — DOXEPIN 3 MG/1
3 TABLET, FILM COATED ORAL
Qty: 30 TABLET | Refills: 5 | Status: SHIPPED | OUTPATIENT
Start: 2022-10-12 | End: 2022-10-18 | Stop reason: SINTOL

## 2022-10-12 NOTE — TELEPHONE ENCOUNTER
PA was approved on 9/7/2022.  Prescription approved per [facility] Refill Protocol.  Kristin Julio RN, BSN, PHN  [facility]

## 2022-10-12 NOTE — TELEPHONE ENCOUNTER
Doxepin transmission failed, resent  Prescription approved per [facility] Refill Protocol.  Jaye Coyle RN, BSN  [facility]

## 2022-10-18 RX ORDER — AMITRIPTYLINE HYDROCHLORIDE 10 MG/1
10-20 TABLET ORAL
Qty: 180 TABLET | Refills: 1 | Status: SHIPPED | OUTPATIENT
Start: 2022-10-18 | End: 2023-05-26

## 2022-11-06 ENCOUNTER — MYC MEDICAL ADVICE (OUTPATIENT)
Dept: PEDIATRICS | Facility: CLINIC | Age: 52
End: 2022-11-06

## 2022-11-06 DIAGNOSIS — N32.81 OVERACTIVE BLADDER: Primary | ICD-10-CM

## 2022-11-07 PROBLEM — N32.81 OVERACTIVE BLADDER: Status: ACTIVE | Noted: 2022-11-07

## 2022-11-07 RX ORDER — MIRABEGRON 25 MG/1
25 TABLET, FILM COATED, EXTENDED RELEASE ORAL DAILY
Qty: 30 TABLET | Refills: 1 | Status: SHIPPED | OUTPATIENT
Start: 2022-11-07 | End: 2022-11-09

## 2022-11-09 RX ORDER — TROSPIUM CHLORIDE 20 MG/1
20 TABLET, FILM COATED ORAL
Qty: 180 TABLET | Refills: 3 | Status: SHIPPED | OUTPATIENT
Start: 2022-11-09 | End: 2023-07-21

## 2023-03-16 ENCOUNTER — OFFICE VISIT (OUTPATIENT)
Dept: DERMATOLOGY | Facility: CLINIC | Age: 53
End: 2023-03-16
Payer: COMMERCIAL

## 2023-03-16 DIAGNOSIS — L90.0 LICHEN SCLEROSUS: ICD-10-CM

## 2023-03-16 DIAGNOSIS — L81.6 POIKILODERMA: ICD-10-CM

## 2023-03-16 DIAGNOSIS — Z86.006 HISTORY OF MELANOMA IN SITU: ICD-10-CM

## 2023-03-16 DIAGNOSIS — D23.9 DERMAL NEVUS: ICD-10-CM

## 2023-03-16 DIAGNOSIS — L82.1 SEBORRHEIC KERATOSIS: ICD-10-CM

## 2023-03-16 DIAGNOSIS — L81.4 LENTIGO: Primary | ICD-10-CM

## 2023-03-16 DIAGNOSIS — D22.9 NEVUS: ICD-10-CM

## 2023-03-16 DIAGNOSIS — D18.01 ANGIOMA OF SKIN: ICD-10-CM

## 2023-03-16 PROCEDURE — 99213 OFFICE O/P EST LOW 20 MIN: CPT | Performed by: DERMATOLOGY

## 2023-03-16 NOTE — PROGRESS NOTES
Breonna Pinon is an extremely pleasant 52 year old year old female patient here today for hx of melanoma in situ and LSeA.  . She notes spots on face.   .   Patient states this has been present for a while.  Patient reports the following symptoms:  brown.  Patient reports the following previous treatments none.  These treatments did not work.  Patient reports the following modifying factors none.  Associated symptoms: none.  Patient has no other skin complaints today.  Remainder of the HPI, Meds, PMH, Allergies, FH, and SH was reviewed in chart.      Past Medical History:   Diagnosis Date     Abnormal Pap smear      Allergic rhinitis, cause unspecified      Anemia      Fertility problem      Malignant melanoma (H)      Other acne        Past Surgical History:   Procedure Laterality Date     BIOPSY OF SKIN LESION  2014     COLONOSCOPY N/A 2022    Procedure: COLONOSCOPY (fv);  Surgeon: Boris Carlos MD;  Location:  GI     COLPOSCOPY,LOOP ELECTRD CERVIX EXCIS       D & C  12/29/10    suction D&C for missed      HC REMOVAL GALLBLADDER       HC REMOVE TONSILS/ADENOIDS,<11 Y/O       LAPAROSCOPY PROCEDURE UNLISTED  2010    removal of R ampullary ectopic pregnancy     LASIK          Family History   Problem Relation Age of Onset     Hypertension Mother      Alcohol/Drug Father      Alcohol/Drug Maternal Grandmother      Cancer Maternal Grandmother         lung/liver     Alcohol/Drug Maternal Grandfather      Cancer Maternal Grandfather         lung/liver     Cancer Paternal Grandmother         brain     Breast Cancer Maternal Aunt      Colon Cancer No family hx of        Social History     Socioeconomic History     Marital status:      Spouse name: Cheko     Number of children: 1     Years of education: Not on file     Highest education level: Not on file   Occupational History     Occupation: internal communications     Employer: [employer]     Comment: marketing management      Employer: [employer]   Tobacco Use     Smoking status: Never     Smokeless tobacco: Never   Substance and Sexual Activity     Alcohol use: Yes     Comment: rare     Drug use: No     Sexual activity: Yes     Partners: Male   Other Topics Concern     Parent/sibling w/ CABG, MI or angioplasty before 65F 55M? No   Social History Narrative     Not on file     Social Determinants of Health     Financial Resource Strain: Low Risk      Difficulty of Paying Living Expenses: Not hard at all   Food Insecurity: No Food Insecurity     Worried About Running Out of Food in the Last Year: Never true     Ran Out of Food in the Last Year: Never true   Transportation Needs: No Transportation Needs     Lack of Transportation (Medical): No     Lack of Transportation (Non-Medical): No   Physical Activity: Sufficiently Active     Days of Exercise per Week: 5 days     Minutes of Exercise per Session: 60 min   Stress: Stress Concern Present     Feeling of Stress : To some extent   Social Connections: Moderately Isolated     Frequency of Communication with Friends and Family: Once a week     Frequency of Social Gatherings with Friends and Family: Twice a week     Attends Nondenominational Services: Never     Active Member of Clubs or Organizations: No     Attends Club or Organization Meetings: Not on file     Marital Status:    Intimate Partner Violence: Not on file   Housing Stability: Low Risk      Unable to Pay for Housing in the Last Year: No     Number of Places Lived in the Last Year: 1     Unstable Housing in the Last Year: No       Outpatient Encounter Medications as of 3/16/2023   Medication Sig Dispense Refill     amitriptyline (ELAVIL) 10 MG tablet Take 1-2 tablets (10-20 mg) by mouth nightly as needed for sleep 180 tablet 1     cetirizine (ZYRTEC) 10 MG tablet Take 1 tablet (10 mg) by mouth every evening 30 tablet 1     clobetasol (TEMOVATE) 0.05 % external ointment To genital area twice weekly 30 g 4     doxycycline  monohydrate (ADOXA) 100 MG tablet Take 1 tablet (100 mg) by mouth 2 times daily Do not fill until patient calls 60 tablet 2     hydroquinone (SARAI) 4 % external cream Apply to face nightly for up to 16 weeks. 28 g 1     hydrOXYzine (ATARAX) 25 MG tablet Take 1-2 tablets (25-50 mg) by mouth every evening as needed for anxiety (or sleep) 100 tablet 4     lactase (LACTAID) 3000 UNIT tablet Take 3,000 Units by mouth 3 times daily (with meals)       Lactobacillus Acid-Pectin CAPS Take 1 capsule by mouth daily. 100 capsule prn     norgestimate-ethinyl estradiol (SPRINTEC 28) 0.25-35 MG-MCG tablet TAKE 1 TABLET BY MOUTH DAILY. TAKE ACTIVE TABLETS DAILY 112 tablet 2     tacrolimus (PROTOPIC) 0.1 % external ointment To genital area daily 30 g 6     trospium (SANCTURA) 20 MG tablet Take 1 tablet (20 mg) by mouth 2 times daily (before meals) 180 tablet 3     No facility-administered encounter medications on file as of 3/16/2023.             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     Stuck on papules and brown macules on trunk and ext   Red papules on trunk  Flesh colored papules on trunk     The remainder of the full exam was normal; the following areas were examined:  conjunctiva/lids,, neck, peripheral vascular system, abdomen, lymph nodes, digits/nails, eccrine and apocrine glands, scalp/hair, face, neck, chest, abdomen, buttocks, back, RUE, LUE, RLE, LLE       Eyes: Conjunctivae/lids:Normal     ENT: Lips, buccal mucosa, tongue: normal    MSK:Normal    Cardiovascular: peripheral edema none    Pulm: Breathing Normal    Lymph Nodes: No Head and Neck Lymphadenopathy     Neuro/Psych: Orientation:Alert and Orientedx3 ; Mood/Affect:normal        A/P:  1. Seborrheic keratosis, lentigo, angioma, dermal nevus, nevi, hx of melanoma in situ   2. Poikiloderma  ipl discussed with patient   2. Lichen sclerosis stable with topicals  It was a pleasure speaking to Breonna Pinon today.  Previous clinic notes and pertinent laboratory tests were reviewed prior to Breonna Pinon's visit.  Nature and genetics of benign skin lesions dicussed with patient.  Signs and Symptoms of skin cancer discussed with patient.  Patient encouraged to perform monthly skin exams.  UV precautions reviewed with patient.  Return to clinic 6 months

## 2023-03-16 NOTE — LETTER
3/16/2023         RE: Breonna Pinon  [id number] [id number]        Dear Colleague,    Thank you for referring your patient, Breonna Pinon, to the [facility]. Please see a copy of my visit note below.    Breonna Pinon is an extremely pleasant 52 year old year old female patient here today for hx of melanoma in situ. She notes spots on face.   .   Patient states this has been present for a while.  Patient reports the following symptoms:  brown.  Patient reports the following previous treatments none.  These treatments did not work.  Patient reports the following modifying factors none.  Associated symptoms: none.  Patient has no other skin complaints today.  Remainder of the HPI, Meds, PMH, Allergies, FH, and SH was reviewed in chart.      Past Medical History:   Diagnosis Date     Abnormal Pap smear      Allergic rhinitis, cause unspecified      Anemia      Fertility problem      Malignant melanoma (H)      Other acne        Past Surgical History:   Procedure Laterality Date     BIOPSY OF SKIN LESION  2014     COLONOSCOPY N/A 2022    Procedure: COLONOSCOPY (fv);  Surgeon: Boris Carlos MD;  Location:  GI     COLPOSCOPY,LOOP ELECTRD CERVIX EXCIS       D & C  12/29/10    suction D&C for missed      HC REMOVAL GALLBLADDER       HC REMOVE TONSILS/ADENOIDS,<11 Y/O       LAPAROSCOPY PROCEDURE UNLISTED  2010    removal of R ampullary ectopic pregnancy     LASIK          Family History   Problem Relation Age of Onset     Hypertension Mother      Alcohol/Drug Father      Alcohol/Drug Maternal Grandmother      Cancer Maternal Grandmother         lung/liver     Alcohol/Drug Maternal Grandfather      Cancer Maternal Grandfather         lung/liver     Cancer Paternal Grandmother         brain     Breast Cancer Maternal Aunt      Colon Cancer No family hx of        Social History     Socioeconomic History     Marital status:      Spouse  name: Cheko     Number of children: 1     Years of education: Not on file     Highest education level: Not on file   Occupational History     Occupation: internal communications     Employer: [employer]     Comment: marketing management     Employer: [employer]   Tobacco Use     Smoking status: Never     Smokeless tobacco: Never   Substance and Sexual Activity     Alcohol use: Yes     Comment: rare     Drug use: No     Sexual activity: Yes     Partners: Male   Other Topics Concern     Parent/sibling w/ CABG, MI or angioplasty before 65F 55M? No   Social History Narrative     Not on file     Social Determinants of Health     Financial Resource Strain: Low Risk      Difficulty of Paying Living Expenses: Not hard at all   Food Insecurity: No Food Insecurity     Worried About Running Out of Food in the Last Year: Never true     Ran Out of Food in the Last Year: Never true   Transportation Needs: No Transportation Needs     Lack of Transportation (Medical): No     Lack of Transportation (Non-Medical): No   Physical Activity: Sufficiently Active     Days of Exercise per Week: 5 days     Minutes of Exercise per Session: 60 min   Stress: Stress Concern Present     Feeling of Stress : To some extent   Social Connections: Moderately Isolated     Frequency of Communication with Friends and Family: Once a week     Frequency of Social Gatherings with Friends and Family: Twice a week     Attends Adventism Services: Never     Active Member of Clubs or Organizations: No     Attends Club or Organization Meetings: Not on file     Marital Status:    Intimate Partner Violence: Not on file   Housing Stability: Low Risk      Unable to Pay for Housing in the Last Year: No     Number of Places Lived in the Last Year: 1     Unstable Housing in the Last Year: No       Outpatient Encounter Medications as of 3/16/2023   Medication Sig Dispense Refill     amitriptyline (ELAVIL) 10 MG tablet Take 1-2 tablets (10-20 mg) by mouth  nightly as needed for sleep 180 tablet 1     cetirizine (ZYRTEC) 10 MG tablet Take 1 tablet (10 mg) by mouth every evening 30 tablet 1     clobetasol (TEMOVATE) 0.05 % external ointment To genital area twice weekly 30 g 4     doxycycline monohydrate (ADOXA) 100 MG tablet Take 1 tablet (100 mg) by mouth 2 times daily Do not fill until patient calls 60 tablet 2     hydroquinone (SARAI) 4 % external cream Apply to face nightly for up to 16 weeks. 28 g 1     hydrOXYzine (ATARAX) 25 MG tablet Take 1-2 tablets (25-50 mg) by mouth every evening as needed for anxiety (or sleep) 100 tablet 4     lactase (LACTAID) 3000 UNIT tablet Take 3,000 Units by mouth 3 times daily (with meals)       Lactobacillus Acid-Pectin CAPS Take 1 capsule by mouth daily. 100 capsule prn     norgestimate-ethinyl estradiol (SPRINTEC 28) 0.25-35 MG-MCG tablet TAKE 1 TABLET BY MOUTH DAILY. TAKE ACTIVE TABLETS DAILY 112 tablet 2     tacrolimus (PROTOPIC) 0.1 % external ointment To genital area daily 30 g 6     trospium (SANCTURA) 20 MG tablet Take 1 tablet (20 mg) by mouth 2 times daily (before meals) 180 tablet 3     No facility-administered encounter medications on file as of 3/16/2023.             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     Stuck on papules and brown macules on trunk and ext   Red papules on trunk  Flesh colored papules on trunk     The remainder of the full exam was normal; the following areas were examined:  conjunctiva/lids, oral mucosa, neck, peripheral vascular system, abdomen, lymph nodes, digits/nails, eccrine and apocrine glands, scalp/hair, face, neck, chest, abdomen, buttocks, back, RUE, LUE, RLE, LLE       Eyes: Conjunctivae/lids:Normal      ENT: Lips, buccal mucosa, tongue: normal    MSK:Normal    Cardiovascular: peripheral edema none    Pulm: Breathing Normal    Lymph Nodes: No Head and Neck Lymphadenopathy     Neuro/Psych: Orientation:Alert and Orientedx3 ; Mood/Affect:normal       A/P:  1. Seborrheic keratosis, lentigo, angioma, dermal nevus, nevi, hx of melanoma in situ   2. Poikiloderma  ipl discussed with patient   It was a pleasure speaking to Breonna Pinon today.  Previous clinic notes and pertinent laboratory tests were reviewed prior to Breonna Pinon's visit.  Nature and genetics of benign skin lesions dicussed with patient.  Signs and Symptoms of skin cancer discussed with patient.  Patient encouraged to perform monthly skin exams.  UV precautions reviewed with patient.  Return to clinic 6 months        Again, thank you for allowing me to participate in the care of your patient.        Sincerely,        Robert Man MD

## 2023-04-28 DIAGNOSIS — Z30.41 ENCOUNTER FOR SURVEILLANCE OF CONTRACEPTIVE PILLS: ICD-10-CM

## 2023-05-01 RX ORDER — NORGESTIMATE AND ETHINYL ESTRADIOL 0.25-0.035
KIT ORAL
Qty: 112 TABLET | Refills: 0 | Status: SHIPPED | OUTPATIENT
Start: 2023-05-01 | End: 2023-05-26

## 2023-05-01 NOTE — TELEPHONE ENCOUNTER
Medication is being filled for 1 time refill only due to:  Patient needs to be seen because it has been more than one year since last visit.     Hermelinda refill sent. Pt already scheduled with Dr. Caldwell for 5/26/23    Prescription approved per [facility] Refill Protocol.    Margarita Saldaña RN

## 2023-05-05 ENCOUNTER — MYC MEDICAL ADVICE (OUTPATIENT)
Dept: PEDIATRICS | Facility: CLINIC | Age: 53
End: 2023-05-05
Payer: COMMERCIAL

## 2023-05-05 DIAGNOSIS — Z12.31 VISIT FOR SCREENING MAMMOGRAM: Primary | ICD-10-CM

## 2023-05-12 ENCOUNTER — ANCILLARY PROCEDURE (OUTPATIENT)
Dept: MAMMOGRAPHY | Facility: CLINIC | Age: 53
End: 2023-05-12
Attending: INTERNAL MEDICINE
Payer: COMMERCIAL

## 2023-05-12 DIAGNOSIS — Z12.31 VISIT FOR SCREENING MAMMOGRAM: ICD-10-CM

## 2023-05-12 PROCEDURE — 77063 BREAST TOMOSYNTHESIS BI: CPT | Mod: TC | Performed by: RADIOLOGY

## 2023-05-12 PROCEDURE — 77067 SCR MAMMO BI INCL CAD: CPT | Mod: TC | Performed by: RADIOLOGY

## 2023-05-20 SDOH — HEALTH STABILITY: PHYSICAL HEALTH: ON AVERAGE, HOW MANY MINUTES DO YOU ENGAGE IN EXERCISE AT THIS LEVEL?: 60 MIN

## 2023-05-20 SDOH — ECONOMIC STABILITY: FOOD INSECURITY: WITHIN THE PAST 12 MONTHS, THE FOOD YOU BOUGHT JUST DIDN'T LAST AND YOU DIDN'T HAVE MONEY TO GET MORE.: NEVER TRUE

## 2023-05-20 SDOH — ECONOMIC STABILITY: INCOME INSECURITY: IN THE LAST 12 MONTHS, WAS THERE A TIME WHEN YOU WERE NOT ABLE TO PAY THE MORTGAGE OR RENT ON TIME?: NO

## 2023-05-20 SDOH — ECONOMIC STABILITY: FOOD INSECURITY: WITHIN THE PAST 12 MONTHS, YOU WORRIED THAT YOUR FOOD WOULD RUN OUT BEFORE YOU GOT MONEY TO BUY MORE.: NEVER TRUE

## 2023-05-20 SDOH — ECONOMIC STABILITY: INCOME INSECURITY: HOW HARD IS IT FOR YOU TO PAY FOR THE VERY BASICS LIKE FOOD, HOUSING, MEDICAL CARE, AND HEATING?: NOT VERY HARD

## 2023-05-20 SDOH — HEALTH STABILITY: PHYSICAL HEALTH: ON AVERAGE, HOW MANY DAYS PER WEEK DO YOU ENGAGE IN MODERATE TO STRENUOUS EXERCISE (LIKE A BRISK WALK)?: 6 DAYS

## 2023-05-20 ASSESSMENT — ENCOUNTER SYMPTOMS
HEMATURIA: 0
MYALGIAS: 0
EYE PAIN: 0
WEAKNESS: 0
NAUSEA: 0
DYSURIA: 0
JOINT SWELLING: 0
HEADACHES: 0
FREQUENCY: 1
BREAST MASS: 0
PALPITATIONS: 1
COUGH: 0
FEVER: 0
DIZZINESS: 0
HEMATOCHEZIA: 0
CONSTIPATION: 0
HEARTBURN: 0
PARESTHESIAS: 0
SHORTNESS OF BREATH: 0
CHILLS: 0
ARTHRALGIAS: 0
SORE THROAT: 0
DIARRHEA: 0
NERVOUS/ANXIOUS: 0
ABDOMINAL PAIN: 0

## 2023-05-20 ASSESSMENT — SOCIAL DETERMINANTS OF HEALTH (SDOH)
HOW OFTEN DO YOU GET TOGETHER WITH FRIENDS OR RELATIVES?: ONCE A WEEK
IN A TYPICAL WEEK, HOW MANY TIMES DO YOU TALK ON THE PHONE WITH FAMILY, FRIENDS, OR NEIGHBORS?: ONCE A WEEK
HOW OFTEN DO YOU ATTEND CHURCH OR RELIGIOUS SERVICES?: 1 TO 4 TIMES PER YEAR
DO YOU BELONG TO ANY CLUBS OR ORGANIZATIONS SUCH AS CHURCH GROUPS UNIONS, FRATERNAL OR ATHLETIC GROUPS, OR SCHOOL GROUPS?: YES

## 2023-05-20 ASSESSMENT — LIFESTYLE VARIABLES
SKIP TO QUESTIONS 9-10: 1
AUDIT-C TOTAL SCORE: 1
HOW OFTEN DO YOU HAVE A DRINK CONTAINING ALCOHOL: MONTHLY OR LESS
HOW MANY STANDARD DRINKS CONTAINING ALCOHOL DO YOU HAVE ON A TYPICAL DAY: 1 OR 2
HOW OFTEN DO YOU HAVE SIX OR MORE DRINKS ON ONE OCCASION: NEVER

## 2023-05-26 ENCOUNTER — OFFICE VISIT (OUTPATIENT)
Dept: PEDIATRICS | Facility: CLINIC | Age: 53
End: 2023-05-26
Payer: COMMERCIAL

## 2023-05-26 VITALS
BODY MASS INDEX: 24.67 KG/M2 | RESPIRATION RATE: 17 BRPM | DIASTOLIC BLOOD PRESSURE: 70 MMHG | WEIGHT: 153.5 LBS | OXYGEN SATURATION: 100 % | TEMPERATURE: 97.1 F | HEIGHT: 66 IN | HEART RATE: 72 BPM | SYSTOLIC BLOOD PRESSURE: 104 MMHG

## 2023-05-26 DIAGNOSIS — F51.01 PRIMARY INSOMNIA: ICD-10-CM

## 2023-05-26 DIAGNOSIS — Z30.41 ENCOUNTER FOR SURVEILLANCE OF CONTRACEPTIVE PILLS: ICD-10-CM

## 2023-05-26 DIAGNOSIS — N95.1 PERIMENOPAUSAL: ICD-10-CM

## 2023-05-26 DIAGNOSIS — Z13.1 SCREENING FOR DIABETES MELLITUS: ICD-10-CM

## 2023-05-26 DIAGNOSIS — D03.72 MELANOMA IN SITU OF LEFT LOWER LEG (H): ICD-10-CM

## 2023-05-26 DIAGNOSIS — Z12.4 SCREENING FOR CERVICAL CANCER: ICD-10-CM

## 2023-05-26 DIAGNOSIS — Z13.220 SCREENING CHOLESTEROL LEVEL: ICD-10-CM

## 2023-05-26 DIAGNOSIS — Z00.00 ROUTINE GENERAL MEDICAL EXAMINATION AT A HEALTH CARE FACILITY: Primary | ICD-10-CM

## 2023-05-26 DIAGNOSIS — Z11.3 ROUTINE SCREENING FOR STI (SEXUALLY TRANSMITTED INFECTION): ICD-10-CM

## 2023-05-26 DIAGNOSIS — J30.89 ALLERGIC RHINITIS DUE TO OTHER ALLERGIC TRIGGER, UNSPECIFIED SEASONALITY: ICD-10-CM

## 2023-05-26 LAB — HBA1C MFR BLD: 5 % (ref 0–5.6)

## 2023-05-26 PROCEDURE — 80053 COMPREHEN METABOLIC PANEL: CPT | Performed by: STUDENT IN AN ORGANIZED HEALTH CARE EDUCATION/TRAINING PROGRAM

## 2023-05-26 PROCEDURE — 87491 CHLMYD TRACH DNA AMP PROBE: CPT | Performed by: STUDENT IN AN ORGANIZED HEALTH CARE EDUCATION/TRAINING PROGRAM

## 2023-05-26 PROCEDURE — 87624 HPV HI-RISK TYP POOLED RSLT: CPT | Performed by: STUDENT IN AN ORGANIZED HEALTH CARE EDUCATION/TRAINING PROGRAM

## 2023-05-26 PROCEDURE — G0124 SCREEN C/V THIN LAYER BY MD: HCPCS | Performed by: PATHOLOGY

## 2023-05-26 PROCEDURE — 87591 N.GONORRHOEAE DNA AMP PROB: CPT | Performed by: STUDENT IN AN ORGANIZED HEALTH CARE EDUCATION/TRAINING PROGRAM

## 2023-05-26 PROCEDURE — 99396 PREV VISIT EST AGE 40-64: CPT | Performed by: STUDENT IN AN ORGANIZED HEALTH CARE EDUCATION/TRAINING PROGRAM

## 2023-05-26 PROCEDURE — 80061 LIPID PANEL: CPT | Performed by: STUDENT IN AN ORGANIZED HEALTH CARE EDUCATION/TRAINING PROGRAM

## 2023-05-26 PROCEDURE — 36415 COLL VENOUS BLD VENIPUNCTURE: CPT | Performed by: STUDENT IN AN ORGANIZED HEALTH CARE EDUCATION/TRAINING PROGRAM

## 2023-05-26 PROCEDURE — G0145 SCR C/V CYTO,THINLAYER,RESCR: HCPCS | Performed by: STUDENT IN AN ORGANIZED HEALTH CARE EDUCATION/TRAINING PROGRAM

## 2023-05-26 PROCEDURE — 83036 HEMOGLOBIN GLYCOSYLATED A1C: CPT | Performed by: STUDENT IN AN ORGANIZED HEALTH CARE EDUCATION/TRAINING PROGRAM

## 2023-05-26 PROCEDURE — 83001 ASSAY OF GONADOTROPIN (FSH): CPT | Performed by: STUDENT IN AN ORGANIZED HEALTH CARE EDUCATION/TRAINING PROGRAM

## 2023-05-26 RX ORDER — AMITRIPTYLINE HYDROCHLORIDE 10 MG/1
10-20 TABLET ORAL
Qty: 180 TABLET | Refills: 4 | Status: SHIPPED | OUTPATIENT
Start: 2023-05-26

## 2023-05-26 RX ORDER — NORGESTIMATE AND ETHINYL ESTRADIOL 0.25-0.035
KIT ORAL
Qty: 112 TABLET | Refills: 4 | Status: SHIPPED | OUTPATIENT
Start: 2023-05-26 | End: 2024-04-16

## 2023-05-26 ASSESSMENT — ENCOUNTER SYMPTOMS
EYE PAIN: 0
SORE THROAT: 0
PALPITATIONS: 1
CHILLS: 0
ARTHRALGIAS: 0
CONSTIPATION: 0
ABDOMINAL PAIN: 0
DIZZINESS: 0
WEAKNESS: 0
FREQUENCY: 1
MYALGIAS: 0
DYSURIA: 0
PARESTHESIAS: 0
NAUSEA: 0
HEMATURIA: 0
SHORTNESS OF BREATH: 0
DIARRHEA: 0
COUGH: 0
NERVOUS/ANXIOUS: 0
HEARTBURN: 0
BREAST MASS: 0
HEMATOCHEZIA: 0
HEADACHES: 0
FEVER: 0
JOINT SWELLING: 0

## 2023-05-26 ASSESSMENT — PAIN SCALES - GENERAL: PAINLEVEL: NO PAIN (0)

## 2023-05-26 NOTE — PATIENT INSTRUCTIONS
Think about [facility]          Preventive Health Recommendations  Female Ages 50 - 64    Yearly exam: See your health care provider every year in order to  o Review health changes.   o Discuss preventive care.    o Review your medicines if your doctor has prescribed any.      Get a Pap test every three years (unless you have an abnormal result and your provider advises testing more often).    If you get Pap tests with HPV test, you only need to test every 5 years, unless you have an abnormal result.     You do not need a Pap test if your uterus was removed (hysterectomy) and you have not had cancer.    You should be tested each year for STDs (sexually transmitted diseases) if you're at risk.     Have a mammogram every 1 to 2 years.    Have a colonoscopy at age 50, or have a yearly FIT test (stool test). These exams screen for colon cancer.      Have a cholesterol test every 5 years, or more often if advised.    Have a diabetes test (fasting glucose) every three years. If you are at risk for diabetes, you should have this test more often.     If you are at risk for osteoporosis (brittle bone disease), think about having a bone density scan (DEXA).    Shots: Get a flu shot each year. Get a tetanus shot every 10 years.    Nutrition:     Eat at least 5 servings of fruits and vegetables each day.    Eat whole-grain bread, whole-wheat pasta and brown rice instead of white grains and rice.    Get adequate Calcium and Vitamin D.     Lifestyle    Exercise at least 150 minutes a week (30 minutes a day, 5 days a week). This will help you control your weight and prevent disease.    Limit alcohol to one drink per day.    No smoking.     Wear sunscreen to prevent skin cancer.     See your dentist every six months for an exam and cleaning.    See your eye doctor every 1 to 2 years.

## 2023-05-26 NOTE — PROGRESS NOTES
SUBJECTIVE:   CC: Breonna is an 52 year old who presents for preventive health visit.       5/26/2023     2:09 PM   Additional Questions   Roomed by Kristin Wolf   Accompanied by none   Patient has been advised of split billing requirements and indicates understanding: Yes  Healthy Habits:     Getting at least 3 servings of Calcium per day:  Yes    Bi-annual eye exam:  Yes    Dental care twice a year:  Yes    Sleep apnea or symptoms of sleep apnea:  Daytime drowsiness    Diet:  Gluten-free/reduced    Frequency of exercise:  4-5 days/week    Duration of exercise:  Greater than 60 minutes    Taking medications regularly:  Yes    Medication side effects:  Not applicable    PHQ-2 Total Score: 0    Additional concerns today:  Yes      Has been using amitriptyline for sleep  -now doing Benadryl (diphenhydramine) at night because of allergy season  -    Social History     Tobacco Use     Smoking status: Never     Smokeless tobacco: Never   Vaping Use     Vaping status: Never Used   Substance Use Topics     Alcohol use: Yes     Comment: rare             5/20/2023     8:41 AM   Alcohol Use   Prescreen: >3 drinks/day or >7 drinks/week? No     Reviewed orders with patient.  Reviewed health maintenance and updated orders accordingly - Yes    Breast Cancer Screening:    FHS-7:       3/30/2022    11:21 AM 5/20/2022     9:02 AM   Breast CA Risk Assessment (FHS-7)   Did any of your first-degree relatives have breast or ovarian cancer? No Yes   Did any of your relatives have bilateral breast cancer? No No   Did any man in your family have breast cancer? No No   Did any woman in your family have breast and ovarian cancer? Yes No   Did any woman in your family have breast cancer before age 50 y? No No   Do you have 2 or more relatives with breast and/or ovarian cancer? Yes No   Do you have 2 or more relatives with breast and/or bowel cancer? No No       Mammogram Screening: Recommended annual mammography  Pertinent mammograms are  "reviewed under the imaging tab.    History of abnormal Pap smear: NO - age 30-65 PAP every 5 years with negative HPV co-testing recommended      Latest Ref Rng & Units 4/5/2019     9:50 AM 4/5/2019     9:06 AM 8/21/2014    12:00 AM   PAP / HPV   PAP (Historical)   NIL   NIL     HPV 16 DNA NEG^Negative Negative       HPV 18 DNA NEG^Negative Negative       Other HR HPV NEG^Negative Negative         Reviewed and updated as needed this visit by clinical staff   Tobacco  Allergies  Meds              Reviewed and updated as needed this visit by Provider                 Review of Systems   Constitutional: Negative for chills and fever.   HENT: Negative for congestion, ear pain, hearing loss and sore throat.    Eyes: Negative for pain and visual disturbance.   Respiratory: Negative for cough and shortness of breath.    Cardiovascular: Positive for palpitations. Negative for chest pain and peripheral edema.   Gastrointestinal: Negative for abdominal pain, constipation, diarrhea, heartburn, hematochezia and nausea.   Breasts:  Positive for tenderness. Negative for breast mass and discharge.   Genitourinary: Positive for frequency and urgency. Negative for dysuria, genital sores, hematuria, pelvic pain, vaginal bleeding and vaginal discharge.   Musculoskeletal: Negative for arthralgias, joint swelling and myalgias.   Skin: Negative for rash.   Neurological: Negative for dizziness, weakness, headaches and paresthesias.   Psychiatric/Behavioral: Negative for mood changes. The patient is not nervous/anxious.      OBJECTIVE:   /70 (BP Location: Right arm, Patient Position: Sitting, Cuff Size: Adult Regular)   Pulse 72   Temp 97.1  F (36.2  C) (Tympanic)   Resp 17   Ht 1.666 m (5' 5.6\")   Wt 69.6 kg (153 lb 8 oz)   SpO2 100%   BMI 25.08 kg/m    Physical Exam  GENERAL: healthy, alert and no distress  EYES: Eyes grossly normal to inspection, and conjunctivae and sclerae normal  HENT: ear canals and TM's normal  NECK: " no adenopathy, no asymmetry, masses, or scars and thyroid normal to palpation  RESP: lungs clear to auscultation - no rales, rhonchi or wheezes  CV: regular rate and rhythm, normal S1 S2, no S3 or S4, no murmur, click or rub, no peripheral edema and peripheral pulses strong  ABDOMEN: soft, nontender, no hepatosplenomegaly, no masses   (female): normal female external genitalia, normal urethral meatus, vaginal mucosa pink, moist, well rugated, and normal cervix/adnexa/uterus without masses or discharge  MS: no gross musculoskeletal defects noted, no edema  SKIN: no suspicious lesions or rashes  NEURO: Normal strength and tone, mentation intact and speech normal  PSYCH: mentation appears normal, affect normal/bright    ASSESSMENT/PLAN:       ICD-10-CM    1. Routine general medical examination at a health care facility  Z00.00       2. Allergic rhinitis due to other allergic trigger, unspecified seasonality  J30.89 Adult Allergy/Asthma Referral      3. Encounter for surveillance of contraceptive pills  Z30.41 norgestimate-ethinyl estradiol (JANN) 0.25-35 MG-MCG tablet      4. Perimenopausal  N95.1 Follicle stimulating hormone     Follicle stimulating hormone      5. Screening for diabetes mellitus  Z13.1 Comprehensive metabolic panel (BMP + Alb, Alk Phos, ALT, AST, Total. Bili, TP)     Hemoglobin A1c     Comprehensive metabolic panel (BMP + Alb, Alk Phos, ALT, AST, Total. Bili, TP)     Hemoglobin A1c      6. Screening cholesterol level  Z13.220 Lipid panel reflex to direct LDL Non-fasting     Lipid panel reflex to direct LDL Non-fasting     CANCELED: Lipid panel reflex to direct LDL Fasting      7. Screening for cervical cancer  Z12.4 Pap screen with HPV - recommended age 30 - 65 years      8. Routine screening for STI (sexually transmitted infection)  Z11.3 NEISSERIA GONORRHOEA PCR     CHLAMYDIA TRACHOMATIS PCR     NEISSERIA GONORRHOEA PCR     CHLAMYDIA TRACHOMATIS PCR      9. Primary insomnia  F51.01 amitriptyline  (ELAVIL) 10 MG tablet      10. Melanoma in situ of left lower leg (H)  D03.72         3, 4. Plan to continue OCP until age 55; continue to have annual discussion. Check FSH today.  10. Follows with dermatology. Gets every 6 months dermatology visit skin exam.    COUNSELING:  Reviewed preventive health counseling, as reflected in patient instructions        She reports that she has never smoked. She has never used smokeless tobacco.          Mira Caldwell MD  [facility]

## 2023-05-27 LAB
ALBUMIN SERPL BCG-MCNC: 4.1 G/DL (ref 3.5–5.2)
ALP SERPL-CCNC: 60 U/L (ref 35–104)
ALT SERPL W P-5'-P-CCNC: 11 U/L (ref 10–35)
ANION GAP SERPL CALCULATED.3IONS-SCNC: 10 MMOL/L (ref 7–15)
AST SERPL W P-5'-P-CCNC: 21 U/L (ref 10–35)
BILIRUB SERPL-MCNC: 0.3 MG/DL
BUN SERPL-MCNC: 10.6 MG/DL (ref 6–20)
CALCIUM SERPL-MCNC: 8.7 MG/DL (ref 8.6–10)
CHLORIDE SERPL-SCNC: 106 MMOL/L (ref 98–107)
CHOLEST SERPL-MCNC: 152 MG/DL
CREAT SERPL-MCNC: 0.66 MG/DL (ref 0.51–0.95)
DEPRECATED HCO3 PLAS-SCNC: 23 MMOL/L (ref 22–29)
FSH SERPL IRP2-ACNC: 0.8 MIU/ML
GFR SERPL CREATININE-BSD FRML MDRD: >90 ML/MIN/1.73M2
GLUCOSE SERPL-MCNC: 81 MG/DL (ref 70–99)
HDLC SERPL-MCNC: 64 MG/DL
LDLC SERPL CALC-MCNC: 66 MG/DL
NONHDLC SERPL-MCNC: 88 MG/DL
POTASSIUM SERPL-SCNC: 3.9 MMOL/L (ref 3.4–5.3)
PROT SERPL-MCNC: 6.5 G/DL (ref 6.4–8.3)
SODIUM SERPL-SCNC: 139 MMOL/L (ref 136–145)
TRIGL SERPL-MCNC: 109 MG/DL

## 2023-05-28 LAB
C TRACH DNA SPEC QL NAA+PROBE: NEGATIVE
N GONORRHOEA DNA SPEC QL NAA+PROBE: NEGATIVE

## 2023-06-01 ENCOUNTER — MYC MEDICAL ADVICE (OUTPATIENT)
Dept: PEDIATRICS | Facility: CLINIC | Age: 53
End: 2023-06-01
Payer: COMMERCIAL

## 2023-06-01 DIAGNOSIS — R63.5 WEIGHT GAIN: ICD-10-CM

## 2023-06-01 DIAGNOSIS — R87.619 ATYPICAL GLANDULAR CELLS OF UNDETERMINED SIGNIFICANCE (AGUS) ON CERVICAL PAP SMEAR: Primary | ICD-10-CM

## 2023-06-01 LAB
BKR LAB AP GYN ADEQUACY: ABNORMAL
BKR LAB AP GYN INTERPRETATION: ABNORMAL
BKR LAB AP HPV REFLEX: ABNORMAL
BKR LAB AP PREVIOUS ABNORMAL: ABNORMAL
PATH REPORT.COMMENTS IMP SPEC: ABNORMAL
PATH REPORT.COMMENTS IMP SPEC: ABNORMAL
PATH REPORT.RELEVANT HX SPEC: ABNORMAL

## 2023-06-02 NOTE — TELEPHONE ENCOUNTER
Dr. Caldwell/Dr. Carolina,    Pt has concerns about her recent PAP results    Not sure who will be following up on this    LOV 5/26/23 Routine exam    Thank you  Celeste Sloan RN on 6/2/2023 at 9:45 AM

## 2023-06-05 ENCOUNTER — LAB (OUTPATIENT)
Dept: LAB | Facility: CLINIC | Age: 53
End: 2023-06-05
Payer: COMMERCIAL

## 2023-06-05 ENCOUNTER — ANCILLARY PROCEDURE (OUTPATIENT)
Dept: ULTRASOUND IMAGING | Facility: CLINIC | Age: 53
End: 2023-06-05
Attending: INTERNAL MEDICINE
Payer: COMMERCIAL

## 2023-06-05 ENCOUNTER — PATIENT OUTREACH (OUTPATIENT)
Dept: PEDIATRICS | Facility: CLINIC | Age: 53
End: 2023-06-05

## 2023-06-05 DIAGNOSIS — R87.619 ATYPICAL GLANDULAR CELLS OF UNDETERMINED SIGNIFICANCE (AGUS) ON CERVICAL PAP SMEAR: ICD-10-CM

## 2023-06-05 DIAGNOSIS — R63.5 WEIGHT GAIN: ICD-10-CM

## 2023-06-05 LAB
HUMAN PAPILLOMA VIRUS 16 DNA: NEGATIVE
HUMAN PAPILLOMA VIRUS 18 DNA: NEGATIVE
HUMAN PAPILLOMA VIRUS FINAL DIAGNOSIS: NORMAL
HUMAN PAPILLOMA VIRUS OTHER HR: NEGATIVE
TSH SERPL DL<=0.005 MIU/L-ACNC: 1.19 UIU/ML (ref 0.3–4.2)

## 2023-06-05 PROCEDURE — 76830 TRANSVAGINAL US NON-OB: CPT

## 2023-06-05 PROCEDURE — 36415 COLL VENOUS BLD VENIPUNCTURE: CPT

## 2023-06-05 PROCEDURE — 84443 ASSAY THYROID STIM HORMONE: CPT

## 2023-07-21 ENCOUNTER — OFFICE VISIT (OUTPATIENT)
Dept: OBGYN | Facility: CLINIC | Age: 53
End: 2023-07-21
Payer: COMMERCIAL

## 2023-07-21 VITALS
WEIGHT: 150 LBS | BODY MASS INDEX: 24.11 KG/M2 | SYSTOLIC BLOOD PRESSURE: 122 MMHG | DIASTOLIC BLOOD PRESSURE: 80 MMHG | HEIGHT: 66 IN

## 2023-07-21 DIAGNOSIS — R87.619 ATYPICAL GLANDULAR CELLS ON CERVICAL PAP SMEAR: Primary | ICD-10-CM

## 2023-07-21 LAB
HCG UR QL: NEGATIVE
INTERNAL QC OK POCT: NORMAL
POCT KIT EXPIRATION DATE: NORMAL
POCT KIT LOT NUMBER: NORMAL

## 2023-07-21 PROCEDURE — 81025 URINE PREGNANCY TEST: CPT | Performed by: OBSTETRICS & GYNECOLOGY

## 2023-07-21 PROCEDURE — 88305 TISSUE EXAM BY PATHOLOGIST: CPT | Performed by: PATHOLOGY

## 2023-07-21 PROCEDURE — 57456 ENDOCERV CURETTAGE W/SCOPE: CPT | Performed by: OBSTETRICS & GYNECOLOGY

## 2023-07-21 PROCEDURE — 58110 BX DONE W/COLPOSCOPY ADD-ON: CPT | Performed by: OBSTETRICS & GYNECOLOGY

## 2023-07-21 NOTE — PROGRESS NOTES
Colposcopy Note    Past History:     No LMP recorded. (Menstrual status: Birth Control).  Patient is not pregnant.  Is on continuous OCPs.  Of note, she had pelvic U/S 2023 showing 7 x 4 x 5 cm uterus w/ 5 mm stripe.    Previous Abnormal Pap Hx:  History of + HPV  05 [facility] - TAHIRA 1   LEEP - TAHIRA 1  05 -  NIL paps   NIL Pap, Neg HPV   NIL Pap, Neg HPV   NIL Pap, Neg HPV  23 [facility] (NOS), neg HPV.     Indications:  Encounter Diagnosis   Name Primary?     Atypical glandular cells on cervical Pap smear Yes       PROCEDURE:  The procedure was explained, and informed consent obtained. The patient was placed in the dorsal lithotomy position. A vaginal speculum was placed. A GC/Chlamydia culture was not obtained. Dilute acetic acid was applied to cervix. The exocervix was visualized. The transformation zone was visualized satisfactorily. Colposcopy was done with white light and with the Shabnam light. Endocervical curettage was done as noted below. No AWE lesions seen so no ectoCx biopsy done. Colposcopy of vagina revealed: normal.      FINDINGS:  Normal colposcopic exam    Physical Exam  Genitourinary:           Comments: No AWE lesions.        PROCEDURE:  Endometrial Biopsy      Indications for procedure:  Evaluation of MARIA DEL CARMEN on Pap    Pre-Procedure Medications:  None    After above colposcopy performed, the cervix was cleansed with antiseptic solution.  A tenaculum was applied to the anterior lip of the cervix.  The uterus was sounded to 7 cm.  The Pipelle Endometrial Suction Curette was used and 1 rotating pass(s) were made between the fundus and the internal os. An scant sample was obtained and sent to pathology.  Instruments were removed.  The patient experienced mild cramping during the procedure.  This subsided rapidly after instruments were removed.  The patient remained supine for a few moments after the procedure and had no other complications. No heavy bleeding was  observed.          ASSESSMENT:  Encounter Diagnosis   Name Primary?     Atypical glandular cells on cervical Pap smear Yes       PLAN:  1)  The patient was instructed to report any fever, cramping after 48 hours, or bleeding for 24-48 hours heavier than normal menses. OTC NSAIDs may be used for cramping PRN. Sexual relations may be resumed in 2-3 days, or after bleeding has stopped.   Pt. is to contact our office if she has not received the pathology report within 1-2 weeks of the procedure.    2)  If Bx shows TAHIRA 1 or less and EMBx benign, follow-up with Ob/Gyn per [facility]CCP Guideline in 1 year for Pap & HPV cotest at next annual exam.    3)  If any pathology on EMBx, Rx as appropriate.     Procedure Planned:   If HGSIL, consider LEEP.    Cameron Ricks MD

## 2023-07-21 NOTE — NURSING NOTE
"Chief Complaint   Patient presents with     Colposcopy     Glandular cells  Also EMB        Initial /80 (BP Location: Left arm, Patient Position: Chair, Cuff Size: Adult Regular)   Ht 1.676 m (5' 6\")   Wt 68 kg (150 lb)   Breastfeeding No   BMI 24.21 kg/m   Estimated body mass index is 24.21 kg/m  as calculated from the following:    Height as of this encounter: 1.676 m (5' 6\").    Weight as of this encounter: 68 kg (150 lb).  BP completed using cuff size: regular    Questioned patient about current smoking habits.  Pt. has never smoked.          The following HM Due: NONE      Nalini Harrison CMA    "

## 2023-07-25 LAB
PATH REPORT.COMMENTS IMP SPEC: NORMAL
PATH REPORT.COMMENTS IMP SPEC: NORMAL
PATH REPORT.FINAL DX SPEC: NORMAL
PATH REPORT.GROSS SPEC: NORMAL
PATH REPORT.MICROSCOPIC SPEC OTHER STN: NORMAL
PATH REPORT.RELEVANT HX SPEC: NORMAL
PHOTO IMAGE: NORMAL

## 2023-07-25 NOTE — RESULT ENCOUNTER NOTE
Please advise patient her colposcopic biopsy from her cervix showed no dysplasia nor cervical cancer, and her EMBx was also normal  as expected.  She does not need anything else done at this time.  She only needs to follow up in 1 year for her annual exam, and at that time a Pap with HPV DNA test will be done again from her cervix to check to see if the cervix cells are normal and the HPV virus has been eradicated.  If so she will resume routine screening 3 years later.  She can let me know if she has any other questions.    Cameron Ricks MD

## 2023-08-21 ENCOUNTER — PATIENT OUTREACH (OUTPATIENT)
Dept: OBGYN | Facility: CLINIC | Age: 53
End: 2023-08-21
Payer: COMMERCIAL

## 2023-08-22 ENCOUNTER — OFFICE VISIT (OUTPATIENT)
Dept: DERMATOLOGY | Facility: CLINIC | Age: 53
End: 2023-08-22
Payer: COMMERCIAL

## 2023-08-22 DIAGNOSIS — L90.0 LICHEN SCLEROSUS: ICD-10-CM

## 2023-08-22 DIAGNOSIS — L81.4 LENTIGO: ICD-10-CM

## 2023-08-22 DIAGNOSIS — Z86.006 HISTORY OF MELANOMA IN SITU: Primary | ICD-10-CM

## 2023-08-22 DIAGNOSIS — D22.9 MULTIPLE NEVI: ICD-10-CM

## 2023-08-22 PROCEDURE — 99214 OFFICE O/P EST MOD 30 MIN: CPT | Performed by: DERMATOLOGY

## 2023-08-22 NOTE — LETTER
8/22/2023      RE: Breonna Pinon  [address]  [place] [address]     Dear Colleague,    Thank you for the opportunity to participate in the care of your patient, Breonna Pinon, at the [facility]. Please see a copy of my visit note below.    Dermatology Problem List:  #. Melanoma in situ - L lower leg, excited in 6/22  #. Dysplastic nevus L chest, moderate  #. Lichen sclerosus et atrophicus diagnosed by biopsy in the patient's 20's.  Initially treated with testosterone and then topical corticosteroids and finally Protopic.  Subsequently followed by Dr. Higgins and Dr. Carolina.   #.  Tinea pedis.   #.  Onychomycosis.   #.  History of dysplastic nevus.   #.  Benign pigmented nevi.   #.  Keratosis pilaris.   #.  Perioral dermatitis- oral doxycycline  #. Melasma- IPL and hydroquinone    Assessment and Plan:    1. Lichen sclerosus  Ongoing activity in the perineum, but overall under good control. Adhesions of the labia minora and clitoral mendes. Past complication of perioral dermatitis which she partially attributed to topical clobetasol. Perioral derm is now clear. Patient inquires about laser treatment or PRP to the adhesions. I recommend a good trial of topical clobetasol first, as other options not likely to be covered by insurance:  -May use clobetasol BID for 1 month to determine if this helps with residual adhesions. Otherwise, continue to alternate the clobetasol and tacrolimus 0.1% ointment, including the perineum in the treatment.     2. Perioral dermatitis  Resolved.     3. Lentigines and melasma  Aggressive sun protection. Has had IPL. Minimal improvement with hydroquinone.     4. MIS: No recurrences. Sun protection. Post inflammatory pigment change in area of treatment, but likely persistent.     5. Benign pigmented nevi: No lesions of concern. Photo of nevi on the back.     6. Papules on the hands: Small but not specific.  No features of verruca identified. Differential diagnosis of keratoses. May use home sal acid which would treat potential entities.     RTC 6 months.     Thank you for involving me in this patient's care.     Abeba Restrepo MD   of Dermatology  [facility]      CC: Referred Self, MD  No address on file    Kaley Carolina MD    ____________________________________________________________________________________________________________________________________________    CC: Patient presents with:  Skin Check: Skin check       HPI: Breonna Pinon is a 52 year old female presenting for skin check and lichen sclerosus.       Patient Active Problem List   Diagnosis    Allergic rhinitis    Atypical glandular cells on cervical Pap smear    CARDIOVASCULAR SCREENING; LDL GOAL LESS THAN 160    Lichen sclerosus et atrophicus    Lactose intolerance    H/O dysplastic nevus    Perioral dermatitis    Primary insomnia    Overactive bladder    Melanoma in situ of left lower leg (H)       Allergies   Allergen Reactions    Gluten      intolerance in foods    Lactose Intolerance (Gi)      intolerance, dairy foods    Seasonal Allergies     Cortisone Dermatitis      When pt takes cortisone, causes perioral dermatitis, per pt.         Current Outpatient Medications   Medication    amitriptyline (ELAVIL) 10 MG tablet    cetirizine (ZYRTEC) 10 MG tablet    clobetasol (TEMOVATE) 0.05 % external ointment    hydroquinone (SARAI) 4 % external cream    hydrOXYzine (ATARAX) 25 MG tablet    lactase (LACTAID) 3000 UNIT tablet    norgestimate-ethinyl estradiol (JANN) 0.25-35 MG-MCG tablet    tacrolimus (PROTOPIC) 0.1 % external ointment     No current facility-administered medications for this visit.       Family History   Problem Relation Age of Onset    Hypertension Mother     Alcohol/Drug Father     Alcohol/Drug Maternal Grandmother     Cancer Maternal Grandmother         lung/liver    Alcohol/Drug Maternal  Grandfather     Cancer Maternal Grandfather         lung/liver    Cancer Paternal Grandmother         brain    Breast Cancer Maternal Aunt     Colon Cancer No family hx of        EXAM:  There were no vitals taken for this visit.  GEN: Alert, no distress  SKIN: Full body exam with genitals  --Light brown macules and patches on the lateral cheeks and temples  --Circular atropic patch on the L mid chest 1 cm  --Circular brown pink atopic patch on the L anterior shin about 2 cm, no repigmentation  --R mid posterior shoulder with an approx 5 mm brown pink macule  --Scattered medium brown macules on the back, chest, arms, legs  --R posterior heel and plantar foot with light brown macules  --Fusion of the labia minora to the labia majora. Unable to retract clitoral mendes. Mottle hypopigmentation to the perineum.   --Few 1 mm hyperkeratotic papules on the dorsal and palmar hands                Please do not hesitate to contact me if you have any questions/concerns.     Sincerely,       Abeba Restrepo MD

## 2023-08-22 NOTE — PROGRESS NOTES
Dermatology Problem List:  #. Melanoma in situ - L lower leg, excited in 6/22  #. Dysplastic nevus L chest, moderate  #. Lichen sclerosus et atrophicus diagnosed by biopsy in the patient's 20's.  Initially treated with testosterone and then topical corticosteroids and finally Protopic.  Subsequently followed by Dr. Higgins and Dr. Carolina.   #.  Tinea pedis.   #.  Onychomycosis.   #.  History of dysplastic nevus.   #.  Benign pigmented nevi.   #.  Keratosis pilaris.   #.  Perioral dermatitis- oral doxycycline  #. Melasma- IPL and hydroquinone    Assessment and Plan:    1. Lichen sclerosus  Ongoing activity in the perineum, but overall under good control. Adhesions of the labia minora and clitoral mendes. Past complication of perioral dermatitis which she partially attributed to topical clobetasol. Perioral derm is now clear. Patient inquires about laser treatment or PRP to the adhesions. I recommend a good trial of topical clobetasol first, as other options not likely to be covered by insurance:  -May use clobetasol BID for 1 month to determine if this helps with residual adhesions. Otherwise, continue to alternate the clobetasol and tacrolimus 0.1% ointment, including the perineum in the treatment.     2. Perioral dermatitis  Resolved.     3. Lentigines and melasma  Aggressive sun protection. Has had IPL. Minimal improvement with hydroquinone.     4. MIS: No recurrences. Sun protection. Post inflammatory pigment change in area of treatment, but likely persistent.     5. Benign pigmented nevi: No lesions of concern. Photo of nevi on the back.     6. Papules on the hands: Small but not specific. No features of verruca identified. Differential diagnosis of keratoses. May use home sal acid which would treat potential entities.     RTC 6 months.     Thank you for involving me in this patient's care.     Abeba Restrepo MD   of Dermatology  [facility]      CC: Referred Self, MD  No address  on file    Kaley Carolina MD    ____________________________________________________________________________________________________________________________________________    CC: Patient presents with:  Skin Check: Skin check       HPI: Breonna Pinon is a 52 year old female presenting for skin check and lichen sclerosus.       Patient Active Problem List   Diagnosis    Allergic rhinitis    Atypical glandular cells on cervical Pap smear    CARDIOVASCULAR SCREENING; LDL GOAL LESS THAN 160    Lichen sclerosus et atrophicus    Lactose intolerance    H/O dysplastic nevus    Perioral dermatitis    Primary insomnia    Overactive bladder    Melanoma in situ of left lower leg (H)       Allergies   Allergen Reactions    Gluten      intolerance in foods    Lactose Intolerance (Gi)      intolerance, dairy foods    Seasonal Allergies     Cortisone Dermatitis      When pt takes cortisone, causes perioral dermatitis, per pt.         Current Outpatient Medications   Medication    amitriptyline (ELAVIL) 10 MG tablet    cetirizine (ZYRTEC) 10 MG tablet    clobetasol (TEMOVATE) 0.05 % external ointment    hydroquinone (SARAI) 4 % external cream    hydrOXYzine (ATARAX) 25 MG tablet    lactase (LACTAID) 3000 UNIT tablet    norgestimate-ethinyl estradiol (JANN) 0.25-35 MG-MCG tablet    tacrolimus (PROTOPIC) 0.1 % external ointment     No current facility-administered medications for this visit.       Family History   Problem Relation Age of Onset    Hypertension Mother     Alcohol/Drug Father     Alcohol/Drug Maternal Grandmother     Cancer Maternal Grandmother         lung/liver    Alcohol/Drug Maternal Grandfather     Cancer Maternal Grandfather         lung/liver    Cancer Paternal Grandmother         brain    Breast Cancer Maternal Aunt     Colon Cancer No family hx of        EXAM:  There were no vitals taken for this visit.  GEN: Alert, no distress  SKIN: Full body exam with genitals  --Light brown macules and patches on the  lateral cheeks and temples  --Circular atropic patch on the L mid chest 1 cm  --Circular brown pink atopic patch on the L anterior shin about 2 cm, no repigmentation  --R mid posterior shoulder with an approx 5 mm brown pink macule  --Scattered medium brown macules on the back, chest, arms, legs  --R posterior heel and plantar foot with light brown macules  --Fusion of the labia minora to the labia majora. Unable to retract clitoral mendes. Mottle hypopigmentation to the perineum.   --Few 1 mm hyperkeratotic papules on the dorsal and palmar hands

## 2023-09-25 ENCOUNTER — E-VISIT (OUTPATIENT)
Dept: PEDIATRICS | Facility: CLINIC | Age: 53
End: 2023-09-25
Payer: COMMERCIAL

## 2023-09-25 DIAGNOSIS — N39.0 ACUTE UTI (URINARY TRACT INFECTION): Primary | ICD-10-CM

## 2023-09-25 PROCEDURE — 99421 OL DIG E/M SVC 5-10 MIN: CPT | Performed by: INTERNAL MEDICINE

## 2023-09-25 RX ORDER — NITROFURANTOIN 25; 75 MG/1; MG/1
100 CAPSULE ORAL 2 TIMES DAILY
Qty: 10 CAPSULE | Refills: 0 | Status: SHIPPED | OUTPATIENT
Start: 2023-09-25 | End: 2023-09-30

## 2023-09-25 NOTE — PATIENT INSTRUCTIONS
Dear Breonna Pinon    After reviewing your responses, I've been able to diagnose you with a urinary tract infection, which is a common infection of the bladder with bacteria.  This is not a sexually transmitted infection, though urinating immediately after intercourse can help prevent infections.  Drinking lots of fluids is also helpful to clear your current infection and prevent the next one.      I have sent a prescription for antibiotics to your pharmacy to treat this infection.    It is important that you take all of your prescribed medication even if your symptoms are improving after a few doses.  Taking all of your medicine helps prevent the symptoms from returning.     If your symptoms worsen, you develop pain in your back or stomach, develop fevers, or are not improving in 5 days, please contact your primary care provider for an appointment or visit any of our convenient Walk-in or Urgent Care Centers to be seen, which can be found on our website here.    Thanks again for choosing us as your health care partner,    Kaley Carolina MD

## 2023-09-28 ENCOUNTER — MYC MEDICAL ADVICE (OUTPATIENT)
Dept: PEDIATRICS | Facility: CLINIC | Age: 53
End: 2023-09-28
Payer: COMMERCIAL

## 2024-01-26 ENCOUNTER — TRANSFERRED RECORDS (OUTPATIENT)
Dept: HEALTH INFORMATION MANAGEMENT | Facility: CLINIC | Age: 54
End: 2024-01-26
Payer: COMMERCIAL

## 2024-01-31 ENCOUNTER — MYC MEDICAL ADVICE (OUTPATIENT)
Dept: DERMATOLOGY | Facility: CLINIC | Age: 54
End: 2024-01-31
Payer: COMMERCIAL

## 2024-02-13 ENCOUNTER — OFFICE VISIT (OUTPATIENT)
Dept: DERMATOLOGY | Facility: CLINIC | Age: 54
End: 2024-02-13
Payer: COMMERCIAL

## 2024-02-13 DIAGNOSIS — Z86.006 HISTORY OF MELANOMA IN SITU: ICD-10-CM

## 2024-02-13 DIAGNOSIS — D22.9 MULTIPLE NEVI: ICD-10-CM

## 2024-02-13 DIAGNOSIS — L90.0 LICHEN SCLEROSUS: Primary | ICD-10-CM

## 2024-02-13 DIAGNOSIS — D49.2 SKIN NEOPLASM: ICD-10-CM

## 2024-02-13 PROCEDURE — 88341 IMHCHEM/IMCYTCHM EA ADD ANTB: CPT | Performed by: DERMATOLOGY

## 2024-02-13 PROCEDURE — 11102 TANGNTL BX SKIN SINGLE LES: CPT | Performed by: DERMATOLOGY

## 2024-02-13 PROCEDURE — 88342 IMHCHEM/IMCYTCHM 1ST ANTB: CPT | Performed by: DERMATOLOGY

## 2024-02-13 PROCEDURE — 99214 OFFICE O/P EST MOD 30 MIN: CPT | Mod: 25 | Performed by: DERMATOLOGY

## 2024-02-13 PROCEDURE — 88305 TISSUE EXAM BY PATHOLOGIST: CPT | Performed by: DERMATOLOGY

## 2024-02-13 RX ORDER — LIDOCAINE HYDROCHLORIDE AND EPINEPHRINE 10; 10 MG/ML; UG/ML
3 INJECTION, SOLUTION INFILTRATION; PERINEURAL ONCE
Status: COMPLETED | OUTPATIENT
Start: 2024-02-13 | End: 2024-02-13

## 2024-02-13 RX ADMIN — LIDOCAINE HYDROCHLORIDE AND EPINEPHRINE 3 ML: 10; 10 INJECTION, SOLUTION INFILTRATION; PERINEURAL at 15:09

## 2024-02-13 NOTE — LETTER
2/13/2024      RE: Breonna Pinon  [address]  [place] [address]     Dear Colleague,    Thank you for the opportunity to participate in the care of your patient, Breonna Pinon, at the [facility]. Please see a copy of my visit note below.    Dermatology Problem List:  #. Melanoma in situ - L lower leg, excited in 6/22  #. Dysplastic nevus L chest, moderate  #. Lichen sclerosus et atrophicus diagnosed by biopsy in the patient's 20's.  Initially treated with testosterone and then topical corticosteroids and finally Protopic.  Subsequently followed by Dr. Higgins and Dr. Carolina. Started microneedling with Dr. Stark in 2/24  #.  Tinea pedis.   #.  Onychomycosis.   #.  History of dysplastic nevus.   #.  Benign pigmented nevi.   #.  Keratosis pilaris.   #.  Perioral dermatitis- oral doxycycline  #. Melasma- IPL and hydroquinone    Assessment and Plan:    1. Lichen sclerosus  Chronic. Adhesions of the labia minora and clitoral mendes. Past complication of perioral dermatitis which she partially attributed to topical clobetasol. Perioral derm is now clear.Currently undergoing microneedling sessions with Dr. Stark. Continue clobetasol BIW.     2. Lentigines and melasma  Aggressive sun protection. Has had IPL. Minimal improvement with hydroquinone.     3. MIS: No recurrences. Sun protection. Post inflammatory pigment change in area of treatment, but likely persistent.     4. Benign pigmented nevi: No lesions of concern. Photo of nevi on the back unchanged.   Lesions to monitor: L temple, L upper arm    5. Neoplasm of uncertain behavior on the R upper arm: changing per patient. Biopsy today.     Shave biopsy:  After discussion of benefits and risks including but not limited to bleeding/bruising, pain/swelling, infection, scar, incomplete removal, nerve damage/numbness, recurrence, and non-diagnostic biopsy, written consent, verbal consent  and photographs were obtained. Time-out was performed. The area was cleaned with isopropyl alcohol. 1 mL of 1% lidocaine with epinephrine was injected to obtain adequate anesthesia of the lesion on the R upper arm. A shave biopsy was performed. Hemostasis was achieved with aluminium chloride. Vaseline and a sterile dressing were applied. The patient tolerated the procedure and no complications were noted. The patient was provided with verbal and written post care instructions.     RTC 6 months.     Thank you for involving me in this patient's care.     Abeba Restrepo MD   of Dermatology  [facility]      CC: Referred Self, MD  No address on file    Kaley Carolina MD    ____________________________________________________________________________________________________________________________________________    CC: Patient presents with:  RECHECK: 6 month skin check current concerns are a spot on both arms, left shin and back and left side of face by side burn tx none        HPI: Breonna Pinon is a 53 year old female presenting for skin check and lichen sclerosus.  Since last visit she has noticed several changing moles.  She reports a pink-brown spot on the right upper arm left upper arm and the right upper back.  She reports skin irritation to the states after swimming in a pool.  She also has a small bump on her left lateral cheek that she feels is new.    Patient recently started microneedling treatment for her lichen sclerosis.  She has had 1 session and has not yet noticed changes.      Patient Active Problem List   Diagnosis    Allergic rhinitis    Atypical glandular cells on cervical Pap smear    CARDIOVASCULAR SCREENING; LDL GOAL LESS THAN 160    Lichen sclerosus et atrophicus    Lactose intolerance    H/O dysplastic nevus    Perioral dermatitis    Primary insomnia    Overactive bladder    Melanoma in situ of left lower leg (H)       Allergies   Allergen Reactions    Gluten       intolerance in foods    Lactose Intolerance (Gi)      intolerance, dairy foods    Seasonal Allergies     Cortisone Dermatitis      When pt takes cortisone, causes perioral dermatitis, per pt.         Current Outpatient Medications   Medication    cetirizine (ZYRTEC) 10 MG tablet    lactase (LACTAID) 3000 UNIT tablet    norgestimate-ethinyl estradiol (JANN) 0.25-35 MG-MCG tablet    amitriptyline (ELAVIL) 10 MG tablet    clobetasol (TEMOVATE) 0.05 % external ointment    hydroquinone (SARAI) 4 % external cream    hydrOXYzine (ATARAX) 25 MG tablet    tacrolimus (PROTOPIC) 0.1 % external ointment     No current facility-administered medications for this visit.       EXAM:  There were no vitals taken for this visit.  GEN: Alert, no distress  SKIN: Full body exam with genitals  --Light brown macules and patches on the lateral cheeks and temples  --Circular atropic patch on the L mid chest 1 cm  --Circular brown pink atopic patch on the L anterior shin about 2 cm, no repigmentation  --R mid posterior shoulder with an approx 5 mm brown pink macule (photo)  --Scattered medium brown macules on the back, chest, arms, legs  --L posterior heel and R mid medial plantar foot with light brown macules  --Fusion of the labia minora to the labia majora. Unable to retract clitoral mendes. Depigmentation on the lateral medial labia minora   --Pink approx 3 mm papule with comma vessels on the L lateral cheek  --Pink brown macules on the R upper extensor arm 5 mm and L upper extensor arm  --L anterior thigh with pink brown macule with positive dimple sign  --Linear abrasion on the R lateral nose                              Please do not hesitate to contact me if you have any questions/concerns.     Sincerely,       Abeba Restrepo MD

## 2024-02-13 NOTE — PROGRESS NOTES
Dermatology Problem List:  #. Melanoma in situ - L lower leg, excited in 6/22  #. Dysplastic nevus L chest, moderate  #. Lichen sclerosus et atrophicus diagnosed by biopsy in the patient's 20's.  Initially treated with testosterone and then topical corticosteroids and finally Protopic.  Subsequently followed by Dr. Higgins and Dr. Carolina. Started microneedling with Dr. Stark in 2/24  #.  Tinea pedis.   #.  Onychomycosis.   #.  History of dysplastic nevus.   #.  Benign pigmented nevi.   #.  Keratosis pilaris.   #.  Perioral dermatitis- oral doxycycline  #. Melasma- IPL and hydroquinone    Assessment and Plan:    1. Lichen sclerosus  Chronic. Adhesions of the labia minora and clitoral mendes. Past complication of perioral dermatitis which she partially attributed to topical clobetasol. Perioral derm is now clear.Currently undergoing microneedling sessions with Dr. Satrk. Continue clobetasol BIW.     2. Lentigines and melasma  Aggressive sun protection. Has had IPL. Minimal improvement with hydroquinone.     3. MIS: No recurrences. Sun protection. Post inflammatory pigment change in area of treatment, but likely persistent.     4. Benign pigmented nevi: No lesions of concern. Photo of nevi on the back unchanged.   Lesions to monitor: L temple, L upper arm    5. Neoplasm of uncertain behavior on the R upper arm: changing per patient. Biopsy today.     Shave biopsy:  After discussion of benefits and risks including but not limited to bleeding/bruising, pain/swelling, infection, scar, incomplete removal, nerve damage/numbness, recurrence, and non-diagnostic biopsy, written consent, verbal consent and photographs were obtained. Time-out was performed. The area was cleaned with isopropyl alcohol. 1 mL of 1% lidocaine with epinephrine was injected to obtain adequate anesthesia of the lesion on the R upper arm. A shave biopsy was performed. Hemostasis was achieved with aluminium chloride. Vaseline and a sterile dressing were  applied. The patient tolerated the procedure and no complications were noted. The patient was provided with verbal and written post care instructions.     RTC 6 months.     Thank you for involving me in this patient's care.     Abeba Restrepo MD   of Dermatology  [facility]      CC: Referred MD Chucky  No address on file    Kaley Carolina MD    ____________________________________________________________________________________________________________________________________________    CC: Patient presents with:  RECHECK: 6 month skin check current concerns are a spot on both arms, left shin and back and left side of face by side burn tx none        HPI: Breonna Pinon is a 53 year old female presenting for skin check and lichen sclerosus.  Since last visit she has noticed several changing moles.  She reports a pink-brown spot on the right upper arm left upper arm and the right upper back.  She reports skin irritation to the states after swimming in a pool.  She also has a small bump on her left lateral cheek that she feels is new.    Patient recently started microneedling treatment for her lichen sclerosis.  She has had 1 session and has not yet noticed changes.      Patient Active Problem List   Diagnosis    Allergic rhinitis    Atypical glandular cells on cervical Pap smear    CARDIOVASCULAR SCREENING; LDL GOAL LESS THAN 160    Lichen sclerosus et atrophicus    Lactose intolerance    H/O dysplastic nevus    Perioral dermatitis    Primary insomnia    Overactive bladder    Melanoma in situ of left lower leg (H)       Allergies   Allergen Reactions    Gluten      intolerance in foods    Lactose Intolerance (Gi)      intolerance, dairy foods    Seasonal Allergies     Cortisone Dermatitis      When pt takes cortisone, causes perioral dermatitis, per pt.         Current Outpatient Medications   Medication    cetirizine (ZYRTEC) 10 MG tablet    lactase (LACTAID) 3000 UNIT tablet     norgestimate-ethinyl estradiol (JANN) 0.25-35 MG-MCG tablet    amitriptyline (ELAVIL) 10 MG tablet    clobetasol (TEMOVATE) 0.05 % external ointment    hydroquinone (SARAI) 4 % external cream    hydrOXYzine (ATARAX) 25 MG tablet    tacrolimus (PROTOPIC) 0.1 % external ointment     No current facility-administered medications for this visit.       EXAM:  There were no vitals taken for this visit.  GEN: Alert, no distress  SKIN: Full body exam with genitals  --Light brown macules and patches on the lateral cheeks and temples  --Circular atropic patch on the L mid chest 1 cm  --Circular brown pink atopic patch on the L anterior shin about 2 cm, no repigmentation  --R mid posterior shoulder with an approx 5 mm brown pink macule (photo)  --Scattered medium brown macules on the back, chest, arms, legs  --L posterior heel and R mid medial plantar foot with light brown macules  --Fusion of the labia minora to the labia majora. Unable to retract clitoral mendes. Depigmentation on the lateral medial labia minora   --Pink approx 3 mm papule with comma vessels on the L lateral cheek  --Pink brown macules on the R upper extensor arm 5 mm and L upper extensor arm  --L anterior thigh with pink brown macule with positive dimple sign  --Linear abrasion on the R lateral nose

## 2024-02-20 LAB
PATH REPORT.COMMENTS IMP SPEC: ABNORMAL
PATH REPORT.COMMENTS IMP SPEC: YES
PATH REPORT.FINAL DX SPEC: ABNORMAL
PATH REPORT.GROSS SPEC: ABNORMAL
PATH REPORT.MICROSCOPIC SPEC OTHER STN: ABNORMAL
PATH REPORT.RELEVANT HX SPEC: ABNORMAL

## 2024-02-21 DIAGNOSIS — D03.61 MELANOMA IN SITU OF RIGHT UPPER ARM (H): Primary | ICD-10-CM

## 2024-02-23 ENCOUNTER — TELEPHONE (OUTPATIENT)
Dept: DERMATOLOGY | Facility: CLINIC | Age: 54
End: 2024-02-23
Payer: COMMERCIAL

## 2024-02-23 NOTE — LETTER
[facility]  [address] [place] [address]  [phone]      February 23, 2024    Breonna Pinon  [address] [place] [id number]      Dear Breonna      You are scheduled for Mohs Surgery on   Tuesday February 27th, 2024 at 7:30 AM.     Please check in at 2nd floor Dermatology [facility].     Be sure to eat a good breakfast and bathe and wash your hair prior to Surgery. Please bring  with you if this is above your neck    If you are taking any anti-coagulants that are prescribed by your Doctor (such as Coumadin/warfarin, Plavix, Aspirin, Ibuprofen), please continue taking them.     However, If you are taking anti-coagulants over the counter without  a Doctor's order for a Medical condition, please discontinue them 10 days prior to Surgery.      Please wear loose comfortable clothing as it could possibly be 4-6 hours until your surgery is completed depending upon how many layers of tissue need to be removed.     Thank you,    Robert Man MD

## 2024-02-23 NOTE — TELEPHONE ENCOUNTER
Spoke with patient and scheduled MOHS appointment at [facility] with Dr. Man on Tuesday February 27th, 2024 at 7:30 AM.     MOHS packet mailed & letter sent through [employer].     Patient had no questions regarding the procedure at the time of the call. She states she has had MOHS before.    Patient requests biopsies of 3 other spots of concern that are behaving similar to the melanoma, I scheduled for a spot check only appt with Aletha Styles PA-C at [facility] on 2/28/24.     Patient expressed understanding.     Estephania GRIFFITH RN BSN  [facility]  [phone]

## 2024-02-23 NOTE — TELEPHONE ENCOUNTER
Pt called in to schedule Mohs and was routed to my line for the [facility] Mohs scheduling.     Pt agreed to be transferred so she can schedule with [facility]. Return pt.     Jonna Ahmadi, Procedure  2/23/2024 9:51 AM

## 2024-02-26 NOTE — PROGRESS NOTES
Surgical Office Location :   [facility]  [address] [place] [address]

## 2024-02-27 ENCOUNTER — OFFICE VISIT (OUTPATIENT)
Dept: DERMATOLOGY | Facility: CLINIC | Age: 54
End: 2024-02-27
Payer: COMMERCIAL

## 2024-02-27 DIAGNOSIS — L81.4 LENTIGO: ICD-10-CM

## 2024-02-27 DIAGNOSIS — D48.5 NEOPLASM OF UNCERTAIN BEHAVIOR OF SKIN: ICD-10-CM

## 2024-02-27 DIAGNOSIS — D18.01 ANGIOMA OF SKIN: ICD-10-CM

## 2024-02-27 DIAGNOSIS — D03.61 MELANOMA IN SITU OF RIGHT UPPER ARM (H): ICD-10-CM

## 2024-02-27 DIAGNOSIS — D23.9 DERMAL NEVUS: Primary | ICD-10-CM

## 2024-02-27 DIAGNOSIS — L82.1 SEBORRHEIC KERATOSES: ICD-10-CM

## 2024-02-27 PROCEDURE — 13121 CMPLX RPR S/A/L 2.6-7.5 CM: CPT | Mod: 59 | Performed by: DERMATOLOGY

## 2024-02-27 PROCEDURE — 11102 TANGNTL BX SKIN SINGLE LES: CPT | Mod: 59 | Performed by: DERMATOLOGY

## 2024-02-27 PROCEDURE — 99213 OFFICE O/P EST LOW 20 MIN: CPT | Mod: 25 | Performed by: DERMATOLOGY

## 2024-02-27 PROCEDURE — 17311 MOHS 1 STAGE H/N/HF/G: CPT | Performed by: DERMATOLOGY

## 2024-02-27 PROCEDURE — 88305 TISSUE EXAM BY PATHOLOGIST: CPT | Performed by: DERMATOLOGY

## 2024-02-27 NOTE — PATIENT INSTRUCTIONS
Sutured Wound Care     Right upper arm    [facility]: [phone]    [facility]: [phone]          No strenuous activity for 48 hours. Resume moderate activity in 48 hours. No heavy exercising until you are seen for follow up in one week.     Take Tylenol as needed for discomfort.                         Do not drink alcoholic beverages for 48 hours.     Keep the pressure bandage in place for 24 hours. If the bandage becomes blood tinged or loose, reinforce it with gauze and tape.        (Refer to the reverse side of this page for management of bleeding).    Remove pressure bandage in 24 hours     Leave the flat bandage in place until your follow up appointment.    Keep the bandage dry. Wash around it carefully.    If the tape becomes soiled or starts to come off, reinforce it with additional paper tape.    Do not smoke for 3 weeks; smoking is detrimental to wound healing.    It is normal to have swelling and bruising around the surgical site. The bruising will fade in approximately 10-14 days. Elevate the area to reduce swelling.    Numbness, itchiness and sensitivity to temperature changes can occur after surgery and may take up to 18 months to normalize.      POSSIBLE COMPLICATIONS    BLEEDING:    Leave the bandage in place.  Use tightly rolled up gauze or a cloth to apply direct pressure over the bandage for 20   minutes.  Reapply pressure for an additional 20 minutes if necessary  Call the office or go to the nearest emergency room if pressure fails to stop the bleeding.  Use additional gauze and tape to maintain pressure once the bleeding has stopped.        PAIN:    Post operative pain should slowly get better, never worse.  A severe increase in pain may indicate a problem. Call the office if this occurs.    In case of emergency phone:Dr Man [phone]             Wound Care Instructions     Left upper arm x2    FOR SUPERFICIAL WOUNDS     [facility]  [phone]    [facility] [phone]                       AFTER 24 HOURS YOU SHOULD REMOVE THE BANDAGE AND BEGIN DAILY DRESSING CHANGES AS FOLLOWS:     1) Remove Dressing.     2) Clean and dry the area with tap water using a Q-tip or sterile gauze pad.     3) Apply Vaseline, Aquaphor, Polysporin ointment or Bacitracin ointment over entire wound.  Do NOT use Neosporin ointment.     4) Cover the wound with a band-aid, or a sterile non-stick gauze pad and micropore paper tape      REPEAT THESE INSTRUCTIONS AT LEAST ONCE A DAY UNTIL THE WOUND HAS COMPLETELY HEALED.    It is an old wives tale that a wound heals better when it is exposed to air and allowed to dry out. The wound will heal faster with a better cosmetic result if it is kept moist with ointment and covered with a bandage.    **Do not let the wound dry out.**      Supplies Needed:      *Cotton tipped applicators (Q-tips)    *Polysporin Ointment or Bacitracin Ointment (NOT NEOSPORIN)    *Band-aids or non-stick gauze pads and micropore paper tape.      PATIENT INFORMATION:    During the healing process you will notice a number of changes. All wounds develop a small halo of redness surrounding the wound.  This means healing is occurring. Severe itching with extensive redness usually indicates sensitivity to the ointment or bandage tape used to dress the wound.  You should call our office if this develops.      Swelling  and/or discoloration around your surgical site is common, particularly when performed around the eye.    All wounds normally drain.  The larger the wound the more drainage there will be.  After 7-10 days, you will notice the wound beginning to shrink and new skin will begin to grow.  The wound is healed when you can see skin has formed over the entire area.  A healed wound has a healthy, shiny look to the surface and is red to dark pink in color to normalize.  Wounds may take approximately 4-6 weeks to heal.  Larger wounds may  take 6-8 weeks.  After the wound is healed you may discontinue dressing changes.    You may experience a sensation of tightness as your wound heals. This is normal and will gradually subside.    Your healed wound may be sensitive to temperature changes. This sensitivity improves with time, but if you re having a lot of discomfort, try to avoid temperature extremes.    Patients frequently experience itching after their wound appears to have healed because of the continue healing under the skin.  Plain Vaseline will help relieve the itching.        POSSIBLE COMPLICATIONS    BLEEDING:    Leave the bandage in place.  Use tightly rolled up gauze or a cloth to apply direct pressure over the bandage for 30  minutes.  Reapply pressure for an additional 30 minutes if necessary  Use additional gauze and tape to maintain pressure once the bleeding has stopped.

## 2024-02-27 NOTE — PROGRESS NOTES
Breonna Pinon , a 53 year old year old female patient, I was asked to see by Dr. 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 Today she notes changing moles on left arm.   Patient has no other skin complaints today.  Remainder of the HPI, Meds, PMH, Allergies, FH, and SH was reviewed in chart.      Past Medical History:   Diagnosis Date    Abnormal Pap smear     Allergic rhinitis, cause unspecified     Anemia     Fertility problem     Malignant melanoma (H)     Other acne        Past Surgical History:   Procedure Laterality Date    BIOPSY OF SKIN LESION  2014    COLONOSCOPY N/A 2022    Procedure: COLONOSCOPY (fv);  Surgeon: Boris Carlos MD;  Location:  GI    COLPOSCOPY,LOOP ELECTRD CERVIX EXCIS      D & C  12/29/10    suction D&C for missed     HC REMOVAL GALLBLADDER      HC REMOVE TONSILS/ADENOIDS,<11 Y/O      LAPAROSCOPY PROCEDURE UNLISTED  2010    removal of R ampullary ectopic pregnancy    LASIK          Family History   Problem Relation Age of Onset    Hypertension Mother     Alcohol/Drug Father     Alcohol/Drug Maternal Grandmother     Cancer Maternal Grandmother         lung/liver    Alcohol/Drug Maternal Grandfather     Cancer Maternal Grandfather         lung/liver    Cancer Paternal Grandmother         brain    Breast Cancer Maternal Aunt     Colon Cancer No family hx of        Social History     Socioeconomic History    Marital status:      Spouse name: Cheko    Number of children: 1    Years of education: Not on file    Highest education level: Not on file   Occupational History    Occupation: internal communications     Employer: [employer]     Comment: marketing management     Employer: [employer]   Tobacco Use    Smoking status: Never    Smokeless tobacco: Never   Vaping Use    Vaping Use: Never used   Substance and Sexual Activity    Alcohol use: Yes     Comment: rare    Drug use: No    Sexual activity: Yes     Partners: Male   Other Topics Concern     Parent/sibling w/ CABG, MI or angioplasty before 65F 55M? No   Social History Narrative    Not on file     Social Determinants of Health     Financial Resource Strain: Low Risk  (5/20/2023)    Overall Financial Resource Strain (CARDIA)     Difficulty of Paying Living Expenses: Not very hard   Food Insecurity: No Food Insecurity (5/20/2023)    Hunger Vital Sign     Worried About Running Out of Food in the Last Year: Never true     Ran Out of Food in the Last Year: Never true   Transportation Needs: No Transportation Needs (5/20/2023)    PRAPARE - Transportation     Lack of Transportation (Medical): No     Lack of Transportation (Non-Medical): No   Physical Activity: Sufficiently Active (5/20/2023)    Exercise Vital Sign     Days of Exercise per Week: 6 days     Minutes of Exercise per Session: 60 min   Stress: Stress Concern Present (5/20/2023)    [place] [employer] - Occupational Stress Questionnaire     Feeling of Stress : To some extent   Social Connections: Moderately Integrated (5/20/2023)    Social Connection and Isolation Panel [NHANES]     Frequency of Communication with Friends and Family: Once a week     Frequency of Social Gatherings with Friends and Family: Once a week     Attends Tenriism Services: 1 to 4 times per year     Active Member of Clubs or Organizations: Yes     Attends Club or Organization Meetings: Not on file     Marital Status:    Interpersonal Safety: Not on file   Housing Stability: Low Risk  (5/20/2023)    Housing Stability Vital Sign     Unable to Pay for Housing in the Last Year: No     Number of Places Lived in the Last Year: 1     Unstable Housing in the Last Year: No       Outpatient Encounter Medications as of 2/27/2024   Medication Sig Dispense Refill    amitriptyline (ELAVIL) 10 MG tablet Take 1-2 tablets (10-20 mg) by mouth nightly as needed for sleep (Patient not taking: Reported on 2/13/2024) 180 tablet 4    cetirizine (ZYRTEC) 10 MG tablet Take 1  tablet (10 mg) by mouth every evening 30 tablet 1    clobetasol (TEMOVATE) 0.05 % external ointment To genital area twice weekly (Patient not taking: Reported on 2/13/2024) 30 g 4    hydroquinone (SARAI) 4 % external cream Apply to face nightly for up to 16 weeks. (Patient not taking: Reported on 2/13/2024) 28 g 1    hydrOXYzine (ATARAX) 25 MG tablet Take 1-2 tablets (25-50 mg) by mouth every evening as needed for anxiety (or sleep) (Patient not taking: Reported on 2/13/2024) 100 tablet 4    lactase (LACTAID) 3000 UNIT tablet Take 3,000 Units by mouth 3 times daily (with meals)      norgestimate-ethinyl estradiol (JANN) 0.25-35 MG-MCG tablet TAKE 1 TABLET BY MOUTH DAILY. TAKE ACTIVE TABLETS DAILY 112 tablet 4    tacrolimus (PROTOPIC) 0.1 % external ointment To genital area daily (Patient not taking: Reported on 2/13/2024) 30 g 6     No facility-administered encounter medications on file as of 2/27/2024.             Review Of Systems  Skin: As above  Eyes: negative  Ears/Nose/Throat: negative  Respiratory: No shortness of breath, dyspnea on exertion, cough, or hemoptysis  Cardiovascular: negative  Gastrointestinal: negative  Genitourinary: negative  Musculoskeletal: negative  Neurologic: negative  Psychiatric: negative  Hematologic/Lymphatic/Immunologic: negative  Endocrine: negative      O:   NAD, WDWN, Alert & Oriented, Mood & Affect wnl, Vitals stable   General appearance anna ii   Vitals stable   Alert, oriented and in no acute distress   L arm superior red brown pigmented macule  L arm inferior red brown papule   R arm 5mm red macule  pigmented macules on trunk and ext with regular borders and pigment networks    Stuck on papules and brown macules on trunk and ext    Red papules on trunk   Flesh colored papules on trunk       Eyes: Conjunctivae/lids:Normal     ENT: Lips, buccal mucosa, tongue: normal    MSK:Normal    Cardiovascular: peripheral edema none    Pulm: Breathing Normal    Lymph Nodes: No axillary  lad     Neuro/Psych: Orientation:Normal; Mood/Affect:Normal      A/P:  1. Seborrheic keratosis, lentigo, angioma, dermal nevus  2. R arm melanoma in situ   MELANOMA DISCUSSED WITH PATIENT:  I discussed the specifics of tumor, prognosis, metachronous melanoma, self exam, and genetics with the patient. I explained the need for monthly skin exams including and taught the patient how to do this. Patient was asked about new or changing moles . I discussed with patient signs and symptoms that could arise in the setting of recurrent locoregional or metastatic disease. In addition, the need to undergo every 6 month dermatologic full skin survey and evaluation given that patients with a diagnosis of melanoma are at risk of recurrence (local and distant) and of subsequent de alina melanoma.      3. L arm superior and inferior r/o atypical nevus   TANGENTIAL BIOPSY SENT OUT:  After consent, anesthesia with LEC and prep, tangential excision performed and specimen sent out for permanent section histology.  No complications and routine wound care. Patient told to call our office in 1-2 weeks for result.         It was a pleasure speaking to Breonna Pinon today.  Previous clinic  notes and pertinent laboratory tests were reviewed prior to Breonna Pinon's visit.  Signs and Symptoms of skin cancer discussed with patient.  Patient encouraged to perform monthly skin exams.  UV precautions reviewed with patient.  Return to clinic 6 months    PROCEDURE NOTE  R arm melanoma in situ   MELANOMA DISCUSSED WITH PATIENT:  I discussed the specifics of tumor, prognosis, metachronous melanoma, self exam, and genetics with the patient. I explained the need for monthly skin exams including and taught the patient how to do this. Patient was asked about new or changing moles . I discussed with patient signs and symptoms that could arise in the setting of recurrent locoregional or metastatic disease. In addition, the need to undergo every 6 month  dermatologic full skin survey and evaluation given that patients with a diagnosis of melanoma are at risk of recurrence (local and distant) and of subsequent de alina melanoma.  . I reviewed treatment options, including a discussion of wide excision (the gold standard) versus Mohs surgery with MART-1 immunostains.     Note: MART-1 (Melanoma Antigen Recognized by T-cells) antibody immunostaining was used during Mohs surgery as per standard protocol, in addition to routine processing of all specimens with hematoxylin and eosin. The peripheral margins/edges were processed with the MART-1 stain (2 specimens total). The center was examined with hematoxylin & eosin and MART-1 immunostains. The patient was informed of the procedure and its risk/benefits during the consent for the procedure.    One or more of the reagents used in immunohistochemical testing in this case may not have been cleared or approved by the U.S. Food and Drug Administration (FDA). The FDA has determined that such clearance or approval is not necessary. These tests are used for clinical purposes. They should not be regarded as investigational or for research. These reagents  performance characteristics have been determined by [facility]. This laboratory is certified under the Clinical Laboratory Improvement Amendments of 1988 (CLIA-88) as qualified to perform high complexity clinical laboratory testing.      MOHS:   Aggressive histology    The rationale for Mohs surgery was discussed with the patient and consent was obtained.  The risks and benefits as well as alternatives to therapy were discussed, in detail.  Specifically, the risks of infection, scarring, bleeding, prolonged wound healing, incomplete removal, allergy to anesthesia, nerve injury and recurrence were addressed.  Indication for Mohs was Aggressive histology. Prior to the procedure, the treatment site was clearly identified and, if available, confirmed with previous  photos and confirmed by the patient   All components of the Universal Protocol/PAUSE rule were completed.  The Mohs surgeon operated in two distinct and integrated capacities as the surgeon and pathologist.      The area was prepped with Betasept.  A rim of normal appearing skin was marked circumferentially around the lesion.  The area was infiltrated with local anesthesia.  The tumor was first debulked to remove all clinically apparent tumor.  An incision following the standard Mohs approach was done and the specimen was oriented,mapped and placed in 3 block(s).  Each specimen was then chromacoded and processed in the Mohs laboratory using standard Mohs technique and submitted for frozen section histology.  Frozen section analysis showed no residual tumor but CLEAR MARGINS.      The tumor was excised using standard Mohs technique in 1 stages(s).  MART 1 stains were performed on 2 specimens. CLEAR MARGINS OBTAINED and Final defect size was 1.5 cm.     We discussed the options for wound management in full with the patient including risks/benefits/ possible outcomes.      REPAIR COMPLEX: Because of the tightness of the surrounding skin and Because of the size and full thickness nature of the defect, Because of the tightness of the surrounding skin, To maintain form and function, and In order to avoid distortion, a complex closure was planned. After LE anesthesia and prep, Burow's triangles were excised in the relaxed skin tension lines. The wound edges were widely undermined greater than width of the defect on both sides by dissection in the subcutaneous plane until adequate tissue mobility was obtained. Hemostasis was obtained. The wound edges were closed in a layered fashion using Vicryl and Fast Absorbing Plain Gut sutures. Postoperative length was 3.5 cm.   EBL minimal; complications none; wound care routine.  The patient was discharged in good condition and will return in one week for wound evaluation.

## 2024-02-27 NOTE — LETTER
2024         RE: Breonna Pinon  [address]  [place] [address]        Dear Colleague,    Thank you for referring your patient, Breonna Pinon, to the [facility]. Please see a copy of my visit note below.    Surgical Office Location :   [facility]  [address] [facility] [place] [address]      Breonna Pinon , a 53 year old year old female patient, I was asked to see by Dr. 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 Today she notes changing moles on left arm.   Patient has no other skin complaints today.  Remainder of the HPI, Meds, PMH, Allergies, FH, and SH was reviewed in chart.      Past Medical History:   Diagnosis Date     Abnormal Pap smear      Allergic rhinitis, cause unspecified      Anemia      Fertility problem      Malignant melanoma (H)      Other acne        Past Surgical History:   Procedure Laterality Date     BIOPSY OF SKIN LESION  2014     COLONOSCOPY N/A 2022    Procedure: COLONOSCOPY (fv);  Surgeon: Boris Carlos MD;  Location:  GI     COLPOSCOPY,LOOP ELECTRD CERVIX EXCIS       D & C  12/29/10    suction D&C for missed      HC REMOVAL GALLBLADDER       HC REMOVE TONSILS/ADENOIDS,<13 Y/O       LAPAROSCOPY PROCEDURE UNLISTED  2010    removal of R ampullary ectopic pregnancy     LASIK          Family History   Problem Relation Age of Onset     Hypertension Mother      Alcohol/Drug Father      Alcohol/Drug Maternal Grandmother      Cancer Maternal Grandmother         lung/liver     Alcohol/Drug Maternal Grandfather      Cancer Maternal Grandfather         lung/liver     Cancer Paternal Grandmother         brain     Breast Cancer Maternal Aunt      Colon Cancer No family hx of        Social History     Socioeconomic History     Marital status:      Spouse name: Cheko     Number of children: 1     Years of education: Not on file     Highest education level: Not on file   Occupational History     Occupation:  internal communications     Employer: [employer]     Comment: marketing management     Employer: [employer]   Tobacco Use     Smoking status: Never     Smokeless tobacco: Never   Vaping Use     Vaping Use: Never used   Substance and Sexual Activity     Alcohol use: Yes     Comment: rare     Drug use: No     Sexual activity: Yes     Partners: Male   Other Topics Concern     Parent/sibling w/ CABG, MI or angioplasty before 65F 55M? No   Social History Narrative     Not on file     Social Determinants of Health     Financial Resource Strain: Low Risk  (5/20/2023)    Overall Financial Resource Strain (CARDIA)      Difficulty of Paying Living Expenses: Not very hard   Food Insecurity: No Food Insecurity (5/20/2023)    Hunger Vital Sign      Worried About Running Out of Food in the Last Year: Never true      Ran Out of Food in the Last Year: Never true   Transportation Needs: No Transportation Needs (5/20/2023)    PRAPARE - Transportation      Lack of Transportation (Medical): No      Lack of Transportation (Non-Medical): No   Physical Activity: Sufficiently Active (5/20/2023)    Exercise Vital Sign      Days of Exercise per Week: 6 days      Minutes of Exercise per Session: 60 min   Stress: Stress Concern Present (5/20/2023)    [place] [employer] - Occupational Stress Questionnaire      Feeling of Stress : To some extent   Social Connections: Moderately Integrated (5/20/2023)    Social Connection and Isolation Panel [NHANES]      Frequency of Communication with Friends and Family: Once a week      Frequency of Social Gatherings with Friends and Family: Once a week      Attends Confucianism Services: 1 to 4 times per year      Active Member of Clubs or Organizations: Yes      Attends Club or Organization Meetings: Not on file      Marital Status:    Interpersonal Safety: Not on file   Housing Stability: Low Risk  (5/20/2023)    Housing Stability Vital Sign      Unable to Pay for Housing  in the Last Year: No      Number of Places Lived in the Last Year: 1      Unstable Housing in the Last Year: No       Outpatient Encounter Medications as of 2/27/2024   Medication Sig Dispense Refill     amitriptyline (ELAVIL) 10 MG tablet Take 1-2 tablets (10-20 mg) by mouth nightly as needed for sleep (Patient not taking: Reported on 2/13/2024) 180 tablet 4     cetirizine (ZYRTEC) 10 MG tablet Take 1 tablet (10 mg) by mouth every evening 30 tablet 1     clobetasol (TEMOVATE) 0.05 % external ointment To genital area twice weekly (Patient not taking: Reported on 2/13/2024) 30 g 4     hydroquinone (SARAI) 4 % external cream Apply to face nightly for up to 16 weeks. (Patient not taking: Reported on 2/13/2024) 28 g 1     hydrOXYzine (ATARAX) 25 MG tablet Take 1-2 tablets (25-50 mg) by mouth every evening as needed for anxiety (or sleep) (Patient not taking: Reported on 2/13/2024) 100 tablet 4     lactase (LACTAID) 3000 UNIT tablet Take 3,000 Units by mouth 3 times daily (with meals)       norgestimate-ethinyl estradiol (JANN) 0.25-35 MG-MCG tablet TAKE 1 TABLET BY MOUTH DAILY. TAKE ACTIVE TABLETS DAILY 112 tablet 4     tacrolimus (PROTOPIC) 0.1 % external ointment To genital area daily (Patient not taking: Reported on 2/13/2024) 30 g 6     No facility-administered encounter medications on file as of 2/27/2024.             Review Of Systems  Skin: As above  Eyes: negative  Ears/Nose/Throat: negative  Respiratory: No shortness of breath, dyspnea on exertion, cough, or hemoptysis  Cardiovascular: negative  Gastrointestinal: negative  Genitourinary: negative  Musculoskeletal: negative  Neurologic: negative  Psychiatric: negative  Hematologic/Lymphatic/Immunologic: negative  Endocrine: negative      O:   NAD, WDWN, Alert & Oriented, Mood & Affect wnl, Vitals stable   General appearance anna ii   Vitals stable   Alert, oriented and in no acute distress   L arm superior red brown pigmented macule  L arm inferior red brown  papule   R arm 5mm red macule  pigmented macules on trunk and ext with regular borders and pigment networks    Stuck on papules and brown macules on trunk and ext    Red papules on trunk   Flesh colored papules on trunk       Eyes: Conjunctivae/lids:Normal     ENT: Lips, buccal mucosa, tongue: normal    MSK:Normal    Cardiovascular: peripheral edema none    Pulm: Breathing Normal    Lymph Nodes: No axillary lad     Neuro/Psych: Orientation:Normal; Mood/Affect:Normal      A/P:  1. Seborrheic keratosis, lentigo, angioma, dermal nevus  2. R arm melanoma in situ   MELANOMA DISCUSSED WITH PATIENT:  I discussed the specifics of tumor, prognosis, metachronous melanoma, self exam, and genetics with the patient. I explained the need for monthly skin exams including and taught the patient how to do this. Patient was asked about new or changing moles . I discussed with patient signs and symptoms that could arise in the setting of recurrent locoregional or metastatic disease. In addition, the need to undergo every 6 month dermatologic full skin survey and evaluation given that patients with a diagnosis of melanoma are at risk of recurrence (local and distant) and of subsequent de alina melanoma.      3. L arm superior and inferior r/o atypical nevus   TANGENTIAL BIOPSY SENT OUT:  After consent, anesthesia with LEC and prep, tangential excision performed and specimen sent out for permanent section histology.  No complications and routine wound care. Patient told to call our office in 1-2 weeks for result.         It was a pleasure speaking to Breonna Pinon today.  Previous clinic  notes and pertinent laboratory tests were reviewed prior to Breonna Pinon's visit.  Signs and Symptoms of skin cancer discussed with patient.  Patient encouraged to perform monthly skin exams.  UV precautions reviewed with patient.  Return to clinic 6 months    PROCEDURE NOTE  R arm melanoma in situ   MELANOMA DISCUSSED WITH PATIENT:  I discussed the  specifics of tumor, prognosis, metachronous melanoma, self exam, and genetics with the patient. I explained the need for monthly skin exams including and taught the patient how to do this. Patient was asked about new or changing moles . I discussed with patient signs and symptoms that could arise in the setting of recurrent locoregional or metastatic disease. In addition, the need to undergo every 6 month dermatologic full skin survey and evaluation given that patients with a diagnosis of melanoma are at risk of recurrence (local and distant) and of subsequent de alina melanoma.  . I reviewed treatment options, including a discussion of wide excision (the gold standard) versus Mohs surgery with MART-1 immunostains.     Note: MART-1 (Melanoma Antigen Recognized by T-cells) antibody immunostaining was used during Mohs surgery as per standard protocol, in addition to routine processing of all specimens with hematoxylin and eosin. The peripheral margins/edges were processed with the MART-1 stain (2 specimens total). The center was examined with hematoxylin & eosin and MART-1 immunostains. The patient was informed of the procedure and its risk/benefits during the consent for the procedure.    One or more of the reagents used in immunohistochemical testing in this case may not have been cleared or approved by the U.S. Food and Drug Administration (FDA). The FDA has determined that such clearance or approval is not necessary. These tests are used for clinical purposes. They should not be regarded as investigational or for research. These reagents  performance characteristics have been determined by [facility]. This laboratory is certified under the Clinical Laboratory Improvement Amendments of 1988 (CLIA-88) as qualified to perform high complexity clinical laboratory testing.      MOHS:   Aggressive histology    The rationale for Mohs surgery was discussed with the patient and consent was obtained.  The  risks and benefits as well as alternatives to therapy were discussed, in detail.  Specifically, the risks of infection, scarring, bleeding, prolonged wound healing, incomplete removal, allergy to anesthesia, nerve injury and recurrence were addressed.  Indication for Mohs was Aggressive histology. Prior to the procedure, the treatment site was clearly identified and, if available, confirmed with previous photos and confirmed by the patient   All components of the Universal Protocol/PAUSE rule were completed.  The Mohs surgeon operated in two distinct and integrated capacities as the surgeon and pathologist.      The area was prepped with Betasept.  A rim of normal appearing skin was marked circumferentially around the lesion.  The area was infiltrated with local anesthesia.  The tumor was first debulked to remove all clinically apparent tumor.  An incision following the standard Mohs approach was done and the specimen was oriented,mapped and placed in 3 block(s).  Each specimen was then chromacoded and processed in the Mohs laboratory using standard Mohs technique and submitted for frozen section histology.  Frozen section analysis showed no residual tumor but CLEAR MARGINS.      The tumor was excised using standard Mohs technique in 1 stages(s).  MART 1 stains were performed on 2 specimens. CLEAR MARGINS OBTAINED and Final defect size was 1.5 cm.     We discussed the options for wound management in full with the patient including risks/benefits/ possible outcomes.      REPAIR COMPLEX: Because of the tightness of the surrounding skin and Because of the size and full thickness nature of the defect, Because of the tightness of the surrounding skin, To maintain form and function, and In order to avoid distortion, a complex closure was planned. After LE anesthesia and prep, Burow's triangles were excised in the relaxed skin tension lines. The wound edges were widely undermined greater than width of the defect on both  sides by dissection in the subcutaneous plane until adequate tissue mobility was obtained. Hemostasis was obtained. The wound edges were closed in a layered fashion using Vicryl and Fast Absorbing Plain Gut sutures. Postoperative length was 3.5 cm.   EBL minimal; complications none; wound care routine.  The patient was discharged in good condition and will return in one week for wound evaluation.        Again, thank you for allowing me to participate in the care of your patient.        Sincerely,        Robert Man MD

## 2024-02-28 ENCOUNTER — OFFICE VISIT (OUTPATIENT)
Dept: DERMATOLOGY | Facility: CLINIC | Age: 54
End: 2024-02-28
Payer: COMMERCIAL

## 2024-02-28 DIAGNOSIS — Z86.006 HISTORY OF MELANOMA IN SITU: Primary | ICD-10-CM

## 2024-02-28 DIAGNOSIS — D48.5 NEOPLASM OF UNCERTAIN BEHAVIOR OF SKIN: ICD-10-CM

## 2024-02-28 PROCEDURE — 11102 TANGNTL BX SKIN SINGLE LES: CPT | Mod: 79 | Performed by: PHYSICIAN ASSISTANT

## 2024-02-28 PROCEDURE — 88305 TISSUE EXAM BY PATHOLOGIST: CPT | Performed by: DERMATOLOGY

## 2024-02-28 PROCEDURE — 11103 TANGNTL BX SKIN EA SEP/ADDL: CPT | Mod: 79 | Performed by: PHYSICIAN ASSISTANT

## 2024-02-28 NOTE — LETTER
2024         RE: Breonna Pinon  [address]  [place] [address]        Dear Colleague,    Thank you for referring your patient, Breonna Pinon, to the [facility]. Please see a copy of my visit note below.    Breonna Pinon is an extremely pleasant 53 year old year old female patient here today for moles on back and temple. Present recently. She notes concerned since her other melanoma did not look concerning. She would like areas removed.  Patient has no other skin complaints today.  Remainder of the HPI, Meds, PMH, Allergies, FH, and SH was reviewed in chart.    Pertinent Hx:  History of MIS on right upper arm 2024 and left lower leg 2022  Past Medical History:   Diagnosis Date     Abnormal Pap smear      Allergic rhinitis, cause unspecified      Anemia      Fertility problem      Malignant melanoma (H)      Other acne        Past Surgical History:   Procedure Laterality Date     BIOPSY OF SKIN LESION  2014     COLONOSCOPY N/A 2022    Procedure: COLONOSCOPY (fv);  Surgeon: Boris Carlos MD;  Location:  GI     COLPOSCOPY,LOOP ELECTRD CERVIX EXCIS       D & C  12/29/10    suction D&C for missed      HC REMOVAL GALLBLADDER       HC REMOVE TONSILS/ADENOIDS,<13 Y/O       LAPAROSCOPY PROCEDURE UNLISTED  2010    removal of R ampullary ectopic pregnancy     LASIK          Family History   Problem Relation Age of Onset     Hypertension Mother      Alcohol/Drug Father      Alcohol/Drug Maternal Grandmother      Cancer Maternal Grandmother         lung/liver     Alcohol/Drug Maternal Grandfather      Cancer Maternal Grandfather         lung/liver     Cancer Paternal Grandmother         brain     Breast Cancer Maternal Aunt      Colon Cancer No family hx of        Social History     Socioeconomic History     Marital status:      Spouse name: Cheko     Number of children: 1     Years of education: Not on file     Highest education level: Not on file    Occupational History     Occupation: internal communications     Employer: [employer]     Comment: marketing management     Employer: [employer]   Tobacco Use     Smoking status: Never     Smokeless tobacco: Never   Vaping Use     Vaping Use: Never used   Substance and Sexual Activity     Alcohol use: Yes     Comment: rare     Drug use: No     Sexual activity: Yes     Partners: Male   Other Topics Concern     Parent/sibling w/ CABG, MI or angioplasty before 65F 55M? No   Social History Narrative     Not on file     Social Determinants of Health     Financial Resource Strain: Low Risk  (5/20/2023)    Overall Financial Resource Strain (CARDIA)      Difficulty of Paying Living Expenses: Not very hard   Food Insecurity: No Food Insecurity (5/20/2023)    Hunger Vital Sign      Worried About Running Out of Food in the Last Year: Never true      Ran Out of Food in the Last Year: Never true   Transportation Needs: No Transportation Needs (5/20/2023)    PRAPARE - Transportation      Lack of Transportation (Medical): No      Lack of Transportation (Non-Medical): No   Physical Activity: Sufficiently Active (5/20/2023)    Exercise Vital Sign      Days of Exercise per Week: 6 days      Minutes of Exercise per Session: 60 min   Stress: Stress Concern Present (5/20/2023)    [employer] [place] [employer] - Occupational Stress Questionnaire      Feeling of Stress : To some extent   Social Connections: Moderately Integrated (5/20/2023)    Social Connection and Isolation Panel [NHANES]      Frequency of Communication with Friends and Family: Once a week      Frequency of Social Gatherings with Friends and Family: Once a week      Attends Holiness Services: 1 to 4 times per year      Active Member of Clubs or Organizations: Yes      Attends Club or Organization Meetings: Not on file      Marital Status:    Interpersonal Safety: Not on file   Housing Stability: Low Risk  (5/20/2023)    Housing Stability  Vital Sign      Unable to Pay for Housing in the Last Year: No      Number of Places Lived in the Last Year: 1      Unstable Housing in the Last Year: No       Outpatient Encounter Medications as of 2/28/2024   Medication Sig Dispense Refill     amitriptyline (ELAVIL) 10 MG tablet Take 1-2 tablets (10-20 mg) by mouth nightly as needed for sleep (Patient not taking: Reported on 2/13/2024) 180 tablet 4     cetirizine (ZYRTEC) 10 MG tablet Take 1 tablet (10 mg) by mouth every evening 30 tablet 1     clobetasol (TEMOVATE) 0.05 % external ointment To genital area twice weekly (Patient not taking: Reported on 2/13/2024) 30 g 4     hydroquinone (SARAI) 4 % external cream Apply to face nightly for up to 16 weeks. (Patient not taking: Reported on 2/13/2024) 28 g 1     hydrOXYzine (ATARAX) 25 MG tablet Take 1-2 tablets (25-50 mg) by mouth every evening as needed for anxiety (or sleep) (Patient not taking: Reported on 2/13/2024) 100 tablet 4     lactase (LACTAID) 3000 UNIT tablet Take 3,000 Units by mouth 3 times daily (with meals)       norgestimate-ethinyl estradiol (JANN) 0.25-35 MG-MCG tablet TAKE 1 TABLET BY MOUTH DAILY. TAKE ACTIVE TABLETS DAILY 112 tablet 4     tacrolimus (PROTOPIC) 0.1 % external ointment To genital area daily (Patient not taking: Reported on 2/13/2024) 30 g 6     No facility-administered encounter medications on file as of 2/28/2024.             O:   NAD, WDWN, Alert & Oriented, Mood & Affect wnl, Vitals stable   Here today alone   There were no vitals taken for this visit.   General appearance normal   Vitals stable   Alert, oriented and in no acute distress      0.2 cm skin colored papule on left temple   0.5 cm brownish pink macule on right upper back   0.7  cm brown papule on right low back       Eyes: Conjunctivae/lids:Normal     ENT: Lips,: normal    MSK:Normal    Pulm: Breathing Normal    Neuro/Psych: Orientation:Alert and Orientedx3 ; Mood/Affect:normal   A/P:  1. R/O atypical nevus on left  temple, right upper back and right low back  TANGENTIAL BIOPSY SENT OUT:  After consent, anesthesia with LEC and prep, tangential excision performed and specimen sent out for permanent section histology.  No complications and routine wound care. Patient told to call our office in 1-2 weeks for result.          Again, thank you for allowing me to participate in the care of your patient.        Sincerely,        Aletha Jeong PA-C

## 2024-02-28 NOTE — PROGRESS NOTES
Breonna Pinon is an extremely pleasant 53 year old year old female patient here today for moles on back and temple. Present recently. She notes concerned since her other melanoma did not look concerning. She would like areas removed.  Patient has no other skin complaints today.  Remainder of the HPI, Meds, PMH, Allergies, FH, and SH was reviewed in chart.    Pertinent Hx:  History of MIS on right upper arm 2024 and left lower leg 2022  Past Medical History:   Diagnosis Date    Abnormal Pap smear     Allergic rhinitis, cause unspecified     Anemia     Fertility problem     Malignant melanoma (H)     Other acne        Past Surgical History:   Procedure Laterality Date    BIOPSY OF SKIN LESION  2014    COLONOSCOPY N/A 2022    Procedure: COLONOSCOPY (fv);  Surgeon: Boris Carlos MD;  Location:  GI    COLPOSCOPY,LOOP ELECTRD CERVIX EXCIS      D & C  12/29/10    suction D&C for missed     HC REMOVAL GALLBLADDER      HC REMOVE TONSILS/ADENOIDS,<13 Y/O      LAPAROSCOPY PROCEDURE UNLISTED  2010    removal of R ampullary ectopic pregnancy    LASIK          Family History   Problem Relation Age of Onset    Hypertension Mother     Alcohol/Drug Father     Alcohol/Drug Maternal Grandmother     Cancer Maternal Grandmother         lung/liver    Alcohol/Drug Maternal Grandfather     Cancer Maternal Grandfather         lung/liver    Cancer Paternal Grandmother         brain    Breast Cancer Maternal Aunt     Colon Cancer No family hx of        Social History     Socioeconomic History    Marital status:      Spouse name: Cheko    Number of children: 1    Years of education: Not on file    Highest education level: Not on file   Occupational History    Occupation: internal communications     Employer: [employer]     Comment: marketing management     Employer: [employer]   Tobacco Use    Smoking status: Never    Smokeless tobacco: Never   Vaping Use    Vaping Use: Never used    Substance and Sexual Activity    Alcohol use: Yes     Comment: rare    Drug use: No    Sexual activity: Yes     Partners: Male   Other Topics Concern    Parent/sibling w/ CABG, MI or angioplasty before 65F 55M? No   Social History Narrative    Not on file     Social Determinants of Health     Financial Resource Strain: Low Risk  (5/20/2023)    Overall Financial Resource Strain (CARDIA)     Difficulty of Paying Living Expenses: Not very hard   Food Insecurity: No Food Insecurity (5/20/2023)    Hunger Vital Sign     Worried About Running Out of Food in the Last Year: Never true     Ran Out of Food in the Last Year: Never true   Transportation Needs: No Transportation Needs (5/20/2023)    PRAPARE - Transportation     Lack of Transportation (Medical): No     Lack of Transportation (Non-Medical): No   Physical Activity: Sufficiently Active (5/20/2023)    Exercise Vital Sign     Days of Exercise per Week: 6 days     Minutes of Exercise per Session: 60 min   Stress: Stress Concern Present (5/20/2023)    [place] [employer] - Occupational Stress Questionnaire     Feeling of Stress : To some extent   Social Connections: Moderately Integrated (5/20/2023)    Social Connection and Isolation Panel [NHANES]     Frequency of Communication with Friends and Family: Once a week     Frequency of Social Gatherings with Friends and Family: Once a week     Attends Amish Services: 1 to 4 times per year     Active Member of Clubs or Organizations: Yes     Attends Club or Organization Meetings: Not on file     Marital Status:    Interpersonal Safety: Not on file   Housing Stability: Low Risk  (5/20/2023)    Housing Stability Vital Sign     Unable to Pay for Housing in the Last Year: No     Number of Places Lived in the Last Year: 1     Unstable Housing in the Last Year: No       Outpatient Encounter Medications as of 2/28/2024   Medication Sig Dispense Refill    amitriptyline (ELAVIL) 10 MG tablet Take 1-2  tablets (10-20 mg) by mouth nightly as needed for sleep (Patient not taking: Reported on 2/13/2024) 180 tablet 4    cetirizine (ZYRTEC) 10 MG tablet Take 1 tablet (10 mg) by mouth every evening 30 tablet 1    clobetasol (TEMOVATE) 0.05 % external ointment To genital area twice weekly (Patient not taking: Reported on 2/13/2024) 30 g 4    hydroquinone (SARAI) 4 % external cream Apply to face nightly for up to 16 weeks. (Patient not taking: Reported on 2/13/2024) 28 g 1    hydrOXYzine (ATARAX) 25 MG tablet Take 1-2 tablets (25-50 mg) by mouth every evening as needed for anxiety (or sleep) (Patient not taking: Reported on 2/13/2024) 100 tablet 4    lactase (LACTAID) 3000 UNIT tablet Take 3,000 Units by mouth 3 times daily (with meals)      norgestimate-ethinyl estradiol (JANN) 0.25-35 MG-MCG tablet TAKE 1 TABLET BY MOUTH DAILY. TAKE ACTIVE TABLETS DAILY 112 tablet 4    tacrolimus (PROTOPIC) 0.1 % external ointment To genital area daily (Patient not taking: Reported on 2/13/2024) 30 g 6     No facility-administered encounter medications on file as of 2/28/2024.             O:   NAD, WDWN, Alert & Oriented, Mood & Affect wnl, Vitals stable   Here today alone   There were no vitals taken for this visit.   General appearance normal   Vitals stable   Alert, oriented and in no acute distress      0.2 cm skin colored papule on left temple   0.5 cm brownish pink macule on right upper back   0.7  cm brown papule on right low back       Eyes: Conjunctivae/lids:Normal     ENT: Lips,: normal    MSK:Normal    Pulm: Breathing Normal    Neuro/Psych: Orientation:Alert and Orientedx3 ; Mood/Affect:normal   A/P:  1. R/O atypical nevus on left temple, right upper back and right low back  TANGENTIAL BIOPSY SENT OUT:  After consent, anesthesia with LEC and prep, tangential excision performed and specimen sent out for permanent section histology.  No complications and routine wound care. Patient told to call our office in 1-2 weeks for  result.

## 2024-02-28 NOTE — PATIENT INSTRUCTIONS
Wound Care After a Biopsy    What is a skin biopsy?  A skin biopsy allows the doctor to examine a very small piece of tissue under the microscope to determine the diagnosis and the best treatment for the skin condition. A local anesthetic (numbing medicine)  is injected with a very small needle into the skin area to be tested. A small piece of skin is taken from the area. Sometimes a suture (stitch) is used.     What are the risks of a skin biopsy?  I will experience scar, bleeding, swelling, pain, crusting and redness. I may experience incomplete removal or recurrence. Risks of this procedure are excessive bleeding, bruising, infection, nerve damage, numbness, thick (hypertrophic or keloidal) scar and non-diagnostic biopsy.    How should I care for my wound for the first 24 hours?  Keep the wound dry and covered for 24 hours  If it bleeds, hold direct pressure on the area for 15 minutes. If bleeding does not stop then go to the emergency room  Avoid strenuous exercise the first 1-2 days or as your doctor instructs you    How should I care for the wound after 24 hours?  After 24 hours, remove the bandage  You may bathe or shower as normal  If you had a scalp biopsy, you can shampoo as usual and can use shower water to clean the biopsy site daily  Clean the wound twice a day with gentle soap and water  Do not scrub, be gentle  Apply white petroleum/Vaseline after cleaning the wound with a cotton swab or a clean finger, and keep the site covered with a Bandaid /bandage. Bandages are not necessary with a scalp biopsy  If you are unable to cover the site with a Bandaid /bandage, re-apply ointment 2-3 times a day to keep the site moist. Moisture will help with healing  Avoid strenuous activity for first 1-2 days  Avoid lakes, rivers, pools, and oceans until the stitches are removed or the site is healed    How do I clean my wound?  Wash hands thoroughly with soap or use hand  before all wound care  Clean the  wound with gentle soap and water  Apply white petroleum/Vaseline  to wound after it is clean  Replace the Bandaid /bandage to keep the wound covered for the first few days or as instructed by your doctor  If you had a scalp biopsy, warm shower water to the area on a daily basis should suffice    What should I use to clean my wound?   Cotton-tipped applicators (Qtips )  White petroleum jelly (Vaseline ). Use a clean new container and use Q-tips to apply.  Bandaids   as needed  Gentle soap     How should I care for my wound long term?  Do not get your wound dirty  Keep up with wound care for one week or until the area is healed.  A small scab will form and fall off by itself when the area is completely healed. The area will be red and will become pink in color as it heals. Sun protection is very important for how your scar will turn out. Sunscreen with an SPF 30 or greater is recommended once the area is healed.  You should have some soreness but it should be mild and slowly go away over several days. Talk to your doctor about using tylenol for pain,    When should I call my doctor?  If you have increased:   Pain or swelling  Pus or drainage (clear or slightly yellow drainage is ok)  Temperature over 100F  Spreading redness or warmth around wound    When will I hear about my results?  The biopsy results can take 2 weeks to come back.  Your results will automatically release to [employer] before your provider has even reviewed them.  The clinic will call you with the results, send you a [employer] message, or have you schedule a follow-up clinic or phone time to discuss the results.  Contact our clinics if you do not hear from us in 2 weeks.    If you have any questions please send us a [employer] message or call us at [phone]    Thank you,  [facility]

## 2024-02-29 LAB
PATH REPORT.COMMENTS IMP SPEC: NORMAL
PATH REPORT.COMMENTS IMP SPEC: NORMAL
PATH REPORT.FINAL DX SPEC: NORMAL
PATH REPORT.GROSS SPEC: NORMAL
PATH REPORT.MICROSCOPIC SPEC OTHER STN: NORMAL
PATH REPORT.RELEVANT HX SPEC: NORMAL

## 2024-03-05 ENCOUNTER — ALLIED HEALTH/NURSE VISIT (OUTPATIENT)
Dept: DERMATOLOGY | Facility: CLINIC | Age: 54
End: 2024-03-05
Payer: COMMERCIAL

## 2024-03-05 DIAGNOSIS — Z48.01 DRESSING CHANGE OR REMOVAL, SURGICAL WOUND: Primary | ICD-10-CM

## 2024-03-05 PROCEDURE — 99207 PR NO CHARGE NURSE ONLY: CPT | Performed by: PHYSICIAN ASSISTANT

## 2024-03-05 NOTE — PROGRESS NOTES
Breonna Pinon comes into clinic today at the request of Dr. Man Ordering Provider for Dressing Change   .    This service provided today was under the supervising provider of the day Sharda Lopez PA-C, who was available if needed.    Please see providers note on 2/27/24 for orders  Patient returned to clinic for post surgery 1 week follow up bandage change. Patient has no complaints, denies pain.  Bandage removed from R arm. Area cleansed with wound cleanser. Site is healing with no signs of infection, wound edges approximating well.   Reapplied new steri strips and paper tape.     Advised to watch for signs and symptons of infection; spreading redness, increasing pain, drainage, odor, fever.   Call or report promptly to clinic if any of these symptoms are present.    Wound care post op instructions given and discussed for 14 day bandage removal and continued incision/scar care.    Patient verbalized understanding.

## 2024-03-05 NOTE — PATIENT INSTRUCTIONS
WOUND CARE INSTRUCTIONS  for  ONE WEEK AFTER SURGERY          Leave flat bandage on your skin for one week after today s bandage change.  In one week when you remove the bandage, you may resume your regular skin care routine, including washing with mild soap and water, applying moisturizer, make-up and sunscreen.    If there are any open or bleeding areas at the incision/graft site you should begin to cover the area with a bandage daily as follows:    Clean and dry the area with plain tap water using a Q-tip or sterile gauze pad.  Apply Polysporin or Bacitracin ointment to the open area.  Cover the wound with a band-aid or a sterile non-stick gauze pad and micropore paper tape.         SIGNS OF INFECTION  - If you notice any of these signs of infection, call your doctor right away: expanding redness around the wound.  - Yellow or greenish-colored pus or cloudy wound drainage.    - Red streaking spreading from the wound.  - Increased swelling, tenderness, or pain around the wound.   - Fever.    Please remember that yellow and clear drainage from a wound can be normal and related to normal wound healing.  Isolated drainage from a wound without a combination of the above features does not indicate infection.       *Once the bandages are removed, the scar will be red and firm (especially in the lip/chin area). This is normal and will fade in time. It might take 6-12 months for this to happen.     *Massaging the area will help the scar soften and fade quicker. Begin to massage the area one month after the bandages have been removed. To massage apply pressure directly and firmly over the scar with the fingertips and move in a circular motion. Massage the area for a few minutes several times a day. Continue to massage the site for several months.    *Approximately 6-8 weeks after surgery it is not uncommon to see the formation of  tender pimple-like  bump along the scar. This is normal. As the scar continues to mature and  the stitches underneath the skin begin to dissolve, this might occur. Do not pick or squeeze, this will resolve on it s own. Should one break open producing a small amount of drainage, apply Polysporin or Bacitracin ointment a few times a day until the wound is completely healed.    *Numbness in the surgical area is expected. It might take 12-18 months for the feeling to return to normal. During this time sensations of itchiness, tingling and occasional sharp pains might be noted. These feelings are normal and will subside once the nerves have completely healed.         IN CASE OF EMERGENCY: Dr Man [phone]     If you were seen in [place] call: [phone]    If you were seen in [place] call: [phone]

## 2024-03-08 SDOH — HEALTH STABILITY: PHYSICAL HEALTH: ON AVERAGE, HOW MANY MINUTES DO YOU ENGAGE IN EXERCISE AT THIS LEVEL?: 50 MIN

## 2024-03-08 SDOH — HEALTH STABILITY: PHYSICAL HEALTH: ON AVERAGE, HOW MANY DAYS PER WEEK DO YOU ENGAGE IN MODERATE TO STRENUOUS EXERCISE (LIKE A BRISK WALK)?: 5 DAYS

## 2024-03-08 ASSESSMENT — SOCIAL DETERMINANTS OF HEALTH (SDOH): HOW OFTEN DO YOU GET TOGETHER WITH FRIENDS OR RELATIVES?: ONCE A WEEK

## 2024-03-15 ENCOUNTER — OFFICE VISIT (OUTPATIENT)
Dept: PEDIATRICS | Facility: CLINIC | Age: 54
End: 2024-03-15
Payer: COMMERCIAL

## 2024-03-15 VITALS
WEIGHT: 152.9 LBS | TEMPERATURE: 97.6 F | OXYGEN SATURATION: 100 % | HEIGHT: 66 IN | BODY MASS INDEX: 24.57 KG/M2 | SYSTOLIC BLOOD PRESSURE: 128 MMHG | DIASTOLIC BLOOD PRESSURE: 80 MMHG | RESPIRATION RATE: 20 BRPM | HEART RATE: 71 BPM

## 2024-03-15 DIAGNOSIS — L90.0 LICHEN SCLEROSUS ET ATROPHICUS: ICD-10-CM

## 2024-03-15 DIAGNOSIS — R87.619 ATYPICAL GLANDULAR CELLS ON CERVICAL PAP SMEAR: ICD-10-CM

## 2024-03-15 DIAGNOSIS — Z00.00 ROUTINE GENERAL MEDICAL EXAMINATION AT A HEALTH CARE FACILITY: Primary | ICD-10-CM

## 2024-03-15 DIAGNOSIS — Z30.41 ENCOUNTER FOR SURVEILLANCE OF CONTRACEPTIVE PILLS: ICD-10-CM

## 2024-03-15 DIAGNOSIS — Z12.4 CERVICAL CANCER SCREENING: ICD-10-CM

## 2024-03-15 DIAGNOSIS — Z12.31 VISIT FOR SCREENING MAMMOGRAM: ICD-10-CM

## 2024-03-15 PROCEDURE — 87624 HPV HI-RISK TYP POOLED RSLT: CPT | Performed by: INTERNAL MEDICINE

## 2024-03-15 PROCEDURE — G0145 SCR C/V CYTO,THINLAYER,RESCR: HCPCS | Performed by: INTERNAL MEDICINE

## 2024-03-15 PROCEDURE — 99396 PREV VISIT EST AGE 40-64: CPT | Performed by: INTERNAL MEDICINE

## 2024-03-15 RX ORDER — NORGESTIMATE AND ETHINYL ESTRADIOL 0.25-0.035
KIT ORAL
Qty: 112 TABLET | Refills: 4 | Status: CANCELLED | OUTPATIENT
Start: 2024-03-15

## 2024-03-15 ASSESSMENT — PAIN SCALES - GENERAL: PAINLEVEL: NO PAIN (0)

## 2024-03-15 NOTE — PROGRESS NOTES
"Preventive Care Visit  [facility] SO Carolina MD, Internal Medicine  Mar 15, 2024      Assessment & Plan     Routine general medical examination at a health care facility  Routine health education discussed: calcium, diet, exercise, weight, safety.     Encounter for surveillance of contraceptive pills  Had no menses when accidentally forgot for 3 days.  Will try going off and notify if has bleeding or menopausal symptoms     Lichen sclerosus et atrophicus  Controlled with home measures, rare use topical steroid and doesn't want a refill right now    Atypical glandular cells on cervical Pap smear    - Pap screen with HPV - recommended age 30 - 65 years    Cervical cancer screening    - Pap screen with HPV - recommended age 30 - 65 years    Visit for screening mammogram    - MA Screen Bilateral w/Mirza; Future              BMI  Estimated body mass index is 25.02 kg/m  as calculated from the following:    Height as of this encounter: 1.665 m (5' 5.55\").    Weight as of this encounter: 69.4 kg (152 lb 14.4 oz).   Weight management plan: Discussed healthy diet and exercise guidelines    Counseling  Appropriate preventive services were discussed with this patient, including applicable screening as appropriate for fall prevention, nutrition, physical activity, Tobacco-use cessation, weight loss and cognition.  Checklist reviewing preventive services available has been given to the patient.      See Patient Instructions    Martha Ravi is a 53 year old, presenting for the following:  Physical  Menopause - forget pill x3d and no bleeding.  Sleep - using benadryl as having allergy issues.  Magnesium also helps        3/15/2024     1:39 PM   Additional Questions   Roomed by Gabi Rosenberg   Accompanied by N/A         3/15/2024     1:39 PM   Patient Reported Additional Medications   Patient reports taking the following new medications No        Health Care Directive  Patient does not have a Health " Care Directive or Living Will: Patient states has Advance Directive and will bring in a copy to clinic.    HPI  Would like to referral  [facility] for dermatology.  Wondering about AI mapping.        3/8/2024   General Health   How would you rate your overall physical health? Good   Feel stress (tense, anxious, or unable to sleep) To some extent   (!) STRESS CONCERN      3/8/2024   Nutrition   Three or more servings of calcium each day? Yes   Diet: Gluten-free/reduced   How many servings of fruit and vegetables per day? 4 or more   How many sweetened beverages each day? 0-1         3/8/2024   Exercise   Days per week of moderate/strenous exercise 5 days - walks a lot and some swimming and some light lifting.  Some yoga   Average minutes spent exercising at this level 50 min         3/8/2024   Social Factors   Frequency of gathering with friends or relatives Once a week   Worry food won't last until get money to buy more No   Food not last or not have enough money for food? No   Do you have housing?  Yes   Are you worried about losing your housing? No   Lack of transportation? No   Unable to get utilities (heat,electricity)? No         3/8/2024   Fall Risk   Fallen 2 or more times in the past year? No   Trouble with walking or balance? No          3/8/2024   Dental   Dentist two times every year? Yes         3/8/2024   TB Screening   Were you born outside of [place]?  No         Today's PHQ-2 Score:       3/15/2024     1:33 PM   PHQ-2 ( 1999 [employer])   Q1: Little interest or pleasure in doing things 0   Q2: Feeling down, depressed or hopeless 0   PHQ-2 Score 0   Q1: Little interest or pleasure in doing things Not at all   Q2: Feeling down, depressed or hopeless Not at all   PHQ-2 Score 0           3/8/2024   Substance Use   Alcohol more than 3/day or more than 7/wk No   Do you use any other substances recreationally? No     Social History     Tobacco Use    Smoking status: Never    Smokeless tobacco: Never   Vaping Use     "Vaping Use: Never used   Substance Use Topics    Alcohol use: Yes     Comment: rare    Drug use: No           5/20/2022   LAST FHS-7 RESULTS   1st degree relative breast or ovarian cancer No - aunt   Any relative bilateral breast cancer No   Any male have breast cancer No   Any ONE woman have BOTH breast AND ovarian cancer No   Any woman with breast cancer before 50yrs No   2 or more relatives with breast AND/OR ovarian cancer No   2 or more relatives with breast AND/OR bowel cancer No       Mammogram Screening - Mammogram every 1-2 years updated in Health Maintenance based on mutual decision making        3/8/2024   STI Screening   New sexual partner(s) since last STI/HIV test? No     History of abnormal Pap smear: YES - other categories - see link Cervical Cytology Screening Guidelines        Latest Ref Rng & Units 5/26/2023     2:40 PM 4/5/2019     9:50 AM 4/5/2019     9:06 AM   PAP / HPV   PAP  Atypical glandular cells, not otherwise specified      PAP (Historical)    NIL    HPV 16 DNA Negative Negative  Negative     HPV 18 DNA Negative Negative  Negative     Other HR HPV Negative Negative  Negative       ASCVD Risk   The 10-year ASCVD risk score (Lorenzo MORIN, et al., 2019) is: 0.8%    Values used to calculate the score:      Age: 53 years      Sex: Female      Is Non- : No      Diabetic: No      Tobacco smoker: No      Systolic Blood Pressure: 128 mmHg      Is BP treated: No      HDL Cholesterol: 64 mg/dL      Total Cholesterol: 152 mg/dL        Reviewed and updated as needed this visit by Provider     Meds  Problems               Labs reviewed in EPIC         Objective    Exam  /80 (BP Location: Left arm, Patient Position: Sitting, Cuff Size: Adult Regular)   Pulse 71   Temp 97.6  F (36.4  C) (Tympanic)   Resp 20   Ht 1.665 m (5' 5.55\")   Wt 69.4 kg (152 lb 14.4 oz)   SpO2 100%   BMI 25.02 kg/m     Estimated body mass index is 25.02 kg/m  as calculated from the " "following:    Height as of this encounter: 1.665 m (5' 5.55\").    Weight as of this encounter: 69.4 kg (152 lb 14.4 oz).    Physical Exam  GENERAL: alert and no distress  EYES: Eyes grossly normal to inspection, PERRL and conjunctivae and sclerae normal  HENT: ear canals and TM's normal, nose and mouth without ulcers or lesions  NECK: no adenopathy, no asymmetry, masses, or scars  RESP: lungs clear to auscultation - no rales, rhonchi or wheezes  CV: regular rate and rhythm, normal S1 S2, no S3 or S4, no murmur, click or rub, no peripheral edema  ABDOMEN: soft, nontender, no hepatosplenomegaly, no masses and bowel sounds normal   (female): normal female external genitalia with a few hypopigmented patches, normal urethral meatus, normal vaginal mucosa  MS: no gross musculoskeletal defects noted, no edema  SKIN: no suspicious lesions or rashes  NEURO: Normal strength and tone, mentation intact and speech normal  PSYCH: mentation appears normal, affect normal/bright        Signed Electronically by: Kaley Carolina MD    "

## 2024-03-15 NOTE — PATIENT INSTRUCTIONS
Try reading the vagina bible by Dr. Paola Florian.  She also wrote the Menopause Manifesto - they are both good.  Preventive Care Advice   This is general advice given by our system to help you stay healthy. However, your care team may have specific advice just for you. Please talk to your care team about your preventive care needs.  Nutrition  Eat 5 or more servings of fruits and vegetables each day.  Try wheat bread, brown rice and whole grain pasta (instead of white bread, rice, and pasta).  Get enough calcium and vitamin D. Check the label on foods and aim for 100% of the RDA (recommended daily allowance).  Lifestyle  Exercise at least 150 minutes each week   (30 minutes a day, 5 days a week).  Do muscle strengthening activities 2 days a week. These help control your weight and prevent disease.  No smoking.  Wear sunscreen to prevent skin cancer.  Have a dental exam and cleaning every 6 months.  Yearly exams  See your health care team every year to talk about:  Any changes in your health.  Any medicines your care team has prescribed.  Preventive care, family planning, and ways to prevent chronic diseases.  Shots (vaccines)   HPV shots (up to age 26), if you've never had them before.  Hepatitis B shots (up to age 59), if you've never had them before.  COVID-19 shot: Get this shot when it's due.  Flu shot: Get a flu shot every year.  Tetanus shot: Get a tetanus shot every 10 years.  Pneumococcal, hepatitis A, and RSV shots: Ask your care team if you need these based on your risk.  Shingles shot (for age 50 and up).  General health tests  Diabetes screening:  Starting at age 35, Get screened for diabetes at least every 3 years.  If you are younger than age 35, ask your care team if you should be screened for diabetes.  Cholesterol test: At age 39, start having a cholesterol test every 5 years, or more often if advised.  Bone density scan (DEXA): At age 50, ask your care team if you should have this scan for  osteoporosis (brittle bones).  Hepatitis C: Get tested at least once in your life.  STIs (sexually transmitted infections)  Before age 24: Ask your care team if you should be screened for STIs.  After age 24: Get screened for STIs if you're at risk. You are at risk for STIs (including HIV) if:  You are sexually active with more than one person.  You don't use condoms every time.  You or a partner was diagnosed with a sexually transmitted infection.  If you are at risk for HIV, ask about PrEP medicine to prevent HIV.  Get tested for HIV at least once in your life, whether you are at risk for HIV or not.  Cancer screening tests  Cervical cancer screening: If you have a cervix, begin getting regular cervical cancer screening tests at age 21. Most people who have regular screenings with normal results can stop after age 65. Talk about this with your provider.  Breast cancer scan (mammogram): If you've ever had breasts, begin having regular mammograms starting at age 40. This is a scan to check for breast cancer.  Colon cancer screening: It is important to start screening for colon cancer at age 45.  Have a colonoscopy test every 10 years (or more often if you're at risk) Or, ask your provider about stool tests like a FIT test every year or Cologuard test every 3 years.  To learn more about your testing options, visit: https://www.Beanup/830543.pdf.  For help making a decision, visit: https://bit.ly/ez21260.  Prostate cancer screening test: If you have a prostate and are age 55 to 69, ask your provider if you would benefit from a yearly prostate cancer screening test.  Lung cancer screening: If you are a current or former smoker age 50 to 80, ask your care team if ongoing lung cancer screenings are right for you.  For informational purposes only. Not to replace the advice of your health care provider. Copyright   2023 [employer]. All rights reserved. Clinically reviewed by the [facility]  Transitions Program. [place] 725217 - REV 01/24.    Learning About Stress  What is stress?     Stress is your body's response to a hard situation. Your body can have a physical, emotional, or mental response. Stress is a fact of life for most people, and it affects everyone differently. What causes stress for you may not be stressful for someone else.  A lot of things can cause stress. You may feel stress when you go on a job interview, take a test, or run a race. This kind of short-term stress is normal and even useful. It can help you if you need to work hard or react quickly. For example, stress can help you finish an important job on time.  Long-term stress is caused by ongoing stressful situations or events. Examples of long-term stress include long-term health problems, ongoing problems at work, or conflicts in your family. Long-term stress can harm your health.  How does stress affect your health?  When you are stressed, your body responds as though you are in danger. It makes hormones that speed up your heart, make you breathe faster, and give you a burst of energy. This is called the fight-or-flight stress response. If the stress is over quickly, your body goes back to normal and no harm is done.  But if stress happens too often or lasts too long, it can have bad effects. Long-term stress can make you more likely to get sick, and it can make symptoms of some diseases worse. If you tense up when you are stressed, you may develop neck, shoulder, or low back pain. Stress is linked to high blood pressure and heart disease.  Stress also harms your emotional health. It can make you fish, tense, or depressed. Your relationships may suffer, and you may not do well at work or school.  What can you do to manage stress?  You can try these things to help manage stress:   Do something active. Exercise or activity can help reduce stress. Walking is a great way to get started. Even everyday activities such as  housecleaning or yard work can help.  Try yoga or aguilar chi. These techniques combine exercise and meditation. You may need some training at first to learn them.  Do something you enjoy. For example, listen to music or go to a movie. Practice your hobby or do volunteer work.  Meditate. This can help you relax, because you are not worrying about what happened before or what may happen in the future.  Do guided imagery. Imagine yourself in any setting that helps you feel calm. You can use online videos, books, or a teacher to guide you.  Do breathing exercises. For example:  From a standing position, bend forward from the waist with your knees slightly bent. Let your arms dangle close to the floor.  Breathe in slowly and deeply as you return to a standing position. Roll up slowly and lift your head last.  Hold your breath for just a few seconds in the standing position.  Breathe out slowly and bend forward from the waist.  Let your feelings out. Talk, laugh, cry, and express anger when you need to. Talking with supportive friends or family, a counselor, or a jenifer leader about your feelings is a healthy way to relieve stress. Avoid discussing your feelings with people who make you feel worse.  Write. It may help to write about things that are bothering you. This helps you find out how much stress you feel and what is causing it. When you know this, you can find better ways to cope.  What can you do to prevent stress?  You might try some of these things to help prevent stress:  Manage your time. This helps you find time to do the things you want and need to do.  Get enough sleep. Your body recovers from the stresses of the day while you are sleeping.  Get support. Your family, friends, and community can make a difference in how you experience stress.  Limit your news feed. Avoid or limit time on social media or news that may make you feel stressed.  Do something active. Exercise or activity can help reduce stress.  "Walking is a great way to get started.  Where can you learn more?  Go to https://www.Hotalot.net/patiented  Enter N032 in the search box to learn more about \"Learning About Stress.\"  Current as of: October 24, 2023               Content Version: 14.0    5824-5373 [employer].   Care instructions adapted under license by your healthcare professional. If you have questions about a medical condition or this instruction, always ask your healthcare professional. [employer], [employer] disclaims any warranty or liability for your use of this information.      "

## 2024-03-18 ENCOUNTER — MYC MEDICAL ADVICE (OUTPATIENT)
Dept: PEDIATRICS | Facility: CLINIC | Age: 54
End: 2024-03-18
Payer: COMMERCIAL

## 2024-03-18 DIAGNOSIS — Z86.018 H/O DYSPLASTIC NEVUS: ICD-10-CM

## 2024-03-18 DIAGNOSIS — D03.72 MELANOMA IN SITU OF LEFT LOWER LEG (H): Primary | ICD-10-CM

## 2024-03-20 LAB
BKR LAB AP GYN ADEQUACY: NORMAL
BKR LAB AP GYN INTERPRETATION: NORMAL
BKR LAB AP HPV REFLEX: NORMAL
BKR LAB AP PREVIOUS ABNL DX: NORMAL
BKR LAB AP PREVIOUS ABNORMAL: NORMAL
PATH REPORT.COMMENTS IMP SPEC: NORMAL
PATH REPORT.COMMENTS IMP SPEC: NORMAL
PATH REPORT.RELEVANT HX SPEC: NORMAL

## 2024-03-21 ENCOUNTER — PATIENT OUTREACH (OUTPATIENT)
Dept: PEDIATRICS | Facility: CLINIC | Age: 54
End: 2024-03-21
Payer: COMMERCIAL

## 2024-03-21 LAB
HUMAN PAPILLOMA VIRUS 16 DNA: NEGATIVE
HUMAN PAPILLOMA VIRUS 18 DNA: NEGATIVE
HUMAN PAPILLOMA VIRUS FINAL DIAGNOSIS: NORMAL
HUMAN PAPILLOMA VIRUS OTHER HR: NEGATIVE

## 2024-04-01 ENCOUNTER — ANCILLARY PROCEDURE (OUTPATIENT)
Dept: MAMMOGRAPHY | Facility: CLINIC | Age: 54
End: 2024-04-01
Attending: INTERNAL MEDICINE
Payer: COMMERCIAL

## 2024-04-01 DIAGNOSIS — Z12.31 VISIT FOR SCREENING MAMMOGRAM: ICD-10-CM

## 2024-04-01 PROCEDURE — 77063 BREAST TOMOSYNTHESIS BI: CPT | Mod: TC | Performed by: RADIOLOGY

## 2024-04-01 PROCEDURE — 77067 SCR MAMMO BI INCL CAD: CPT | Mod: TC | Performed by: RADIOLOGY

## 2024-04-16 ENCOUNTER — MYC REFILL (OUTPATIENT)
Dept: PEDIATRICS | Facility: CLINIC | Age: 54
End: 2024-04-16
Payer: COMMERCIAL

## 2024-04-16 DIAGNOSIS — Z30.41 ENCOUNTER FOR SURVEILLANCE OF CONTRACEPTIVE PILLS: ICD-10-CM

## 2024-04-16 RX ORDER — NORGESTIMATE AND ETHINYL ESTRADIOL 0.25-0.035
KIT ORAL
Qty: 112 TABLET | Refills: 4 | Status: SHIPPED | OUTPATIENT
Start: 2024-04-16 | End: 2024-07-16

## 2024-05-21 ENCOUNTER — TRANSFERRED RECORDS (OUTPATIENT)
Dept: HEALTH INFORMATION MANAGEMENT | Facility: CLINIC | Age: 54
End: 2024-05-21
Payer: COMMERCIAL

## 2024-07-16 ENCOUNTER — E-VISIT (OUTPATIENT)
Dept: PEDIATRICS | Facility: CLINIC | Age: 54
End: 2024-07-16
Payer: COMMERCIAL

## 2024-07-16 DIAGNOSIS — Z30.41 ENCOUNTER FOR SURVEILLANCE OF CONTRACEPTIVE PILLS: ICD-10-CM

## 2024-07-16 PROCEDURE — 99421 OL DIG E/M SVC 5-10 MIN: CPT | Performed by: INTERNAL MEDICINE

## 2024-07-16 RX ORDER — DROSPIRENONE AND ETHINYL ESTRADIOL 0.03MG-3MG
1 KIT ORAL DAILY
Qty: 112 TABLET | Refills: 4 | Status: SHIPPED | OUTPATIENT
Start: 2024-07-16

## 2024-07-17 NOTE — TELEPHONE ENCOUNTER
"Provider E-Visit time total (minutes): 7 mins  Estimated body mass index is 25.02 kg/m  as calculated from the following:    Height as of 3/15/24: 1.665 m (5' 5.55\").    Weight as of 3/15/24: 69.4 kg (152 lb 14.4 oz).       "

## 2024-07-17 NOTE — PATIENT INSTRUCTIONS
Thank you for choosing us for your care. I have placed an order for a prescription so that you can start treatment. View your full visit summary for details by clicking on the link below. Your pharmacist will able to address any questions you may have about the medication.     If you're not feeling better within 5-7 days, please schedule an appointment.  You can schedule an appointment right here in [facility], or call [phone]  If the visit is for the same symptoms as your eVisit, we'll refund the cost of your eVisit if seen within seven days.

## 2024-07-23 ENCOUNTER — OFFICE VISIT (OUTPATIENT)
Dept: DERMATOLOGY | Facility: CLINIC | Age: 54
End: 2024-07-23
Payer: COMMERCIAL

## 2024-07-23 DIAGNOSIS — Z86.006 HISTORY OF MELANOMA IN SITU: Primary | ICD-10-CM

## 2024-07-23 DIAGNOSIS — D22.9 MULTIPLE NEVI: ICD-10-CM

## 2024-07-23 DIAGNOSIS — D49.2 SKIN NEOPLASM: ICD-10-CM

## 2024-07-23 DIAGNOSIS — L81.4 LENTIGO: ICD-10-CM

## 2024-07-23 DIAGNOSIS — L90.0 LICHEN SCLEROSUS: ICD-10-CM

## 2024-07-23 PROCEDURE — 11102 TANGNTL BX SKIN SINGLE LES: CPT | Performed by: DERMATOLOGY

## 2024-07-23 PROCEDURE — 88305 TISSUE EXAM BY PATHOLOGIST: CPT | Performed by: DERMATOLOGY

## 2024-07-23 PROCEDURE — 99213 OFFICE O/P EST LOW 20 MIN: CPT | Mod: 25 | Performed by: DERMATOLOGY

## 2024-07-23 RX ORDER — LIDOCAINE HYDROCHLORIDE AND EPINEPHRINE 10; 10 MG/ML; UG/ML
3 INJECTION, SOLUTION INFILTRATION; PERINEURAL ONCE
Status: COMPLETED | OUTPATIENT
Start: 2024-07-23 | End: 2024-07-23

## 2024-07-23 RX ADMIN — LIDOCAINE HYDROCHLORIDE AND EPINEPHRINE 3 ML: 10; 10 INJECTION, SOLUTION INFILTRATION; PERINEURAL at 12:46

## 2024-07-23 NOTE — LETTER
7/23/2024      RE: Breonna Pinon  [address]  [place] [address]     Dear Colleague,    Thank you for the opportunity to participate in the care of your patient, Breonna Pinon, at the [facility] SO at [facility]. Please see a copy of my visit note below.    Dermatology Problem List:  #. Melanoma in situ   - L lower leg, excited in 6/22  - R upper arm- amelanotic,  2/2024 MMS  #. Dysplastic nevus L chest, moderate  #. Lichen sclerosus et atrophicus diagnosed by biopsy in the patient's 20's.  Initially treated with testosterone and then topical corticosteroids and finally Protopic.  Subsequently followed by Dr. Higgins and Dr. Carolina. Started microneedling with Dr. Stark in 2/24  #.  Tinea pedis.   #.  Onychomycosis.   #.  History of dysplastic nevus.   #.  Benign pigmented nevi.   #.  Keratosis pilaris.   #.  Perioral dermatitis- oral doxycycline  #. Melasma- IPL and hydroquinone    Assessment and Plan:    1. Lichen sclerosus  Chronic. Adhesions of the labia minora and clitoral mendes. Past complication of perioral dermatitis which she partially attributed to topical clobetasol. Has been undergoing multiple treatments with microneedling, PRP, several laser procedures with Ob/GYN.    2. Lentigines and melasma  Aggressive sun protection. Has had IPL. Minimal improvement with hydroquinone previously.     3. MIS x2: No recurrences. Sun protection.     4. History of dysplastic nevi: Mole mapping planned with Dr. Francisco with [facility].     5. Neoplasm of uncertain behavior on the R upper back. Concern for MIS. Biopsy today.     Shave biopsy:  After discussion of benefits and risks including but not limited to bleeding/bruising, pain/swelling, infection, scar, incomplete removal, nerve damage/numbness, recurrence, and non-diagnostic biopsy, written consent, verbal consent and photographs were obtained. Time-out was performed. The area was cleaned  with isopropyl alcohol. 1 mL of 1% lidocaine with epinephrine was injected to obtain adequate anesthesia of the lesion on the R upper back. A shave biopsy was performed. Hemostasis was achieved with aluminium chloride. Vaseline and a sterile dressing were applied. The patient tolerated the procedure and no complications were noted. The patient was provided with verbal and written post care instructions.     F/up planned with [facility] for mole mapping.     Thank you for involving me in this patient's care.     Abeba Restrepo MD   of Dermatology  [facility]      CC: Referred MD Chucky  No address on file    Kaley Carolina MD    ____________________________________________________________________________________________________________________________________________    CC: Patient presents with:  RECHECK: 6 month skin check current concerns about spot middle of the back- darker mole in mid back and pink spots on the legs, for her lichen sclerous the [employer] AV it became healthier looking but the lichen sclerous is still there        HPI: Breonna Pinon is a 53 year old female presenting for skin check and lichen sclerosus.  Since last visit she has had MMS of amelanotic MIS on the R upper arm by Dr. Man. She has also had multiple moles biopsied by Dr. Man's team, one of which had moderate atypia. Patient notes that she is worried about the mole on the R upper back as it seems darker than her others and she is very worried given her history of MIS. Notes treatments as above for LS with Dr. Stark. Has had some improvements in the skin texture, but not resolution of adhesions of white coloration.       Patient Active Problem List   Diagnosis    Allergic rhinitis    Atypical glandular cells on cervical Pap smear    CARDIOVASCULAR SCREENING; LDL GOAL LESS THAN 160    Lichen sclerosus et atrophicus    Lactose intolerance    H/O dysplastic nevus    Perioral dermatitis     Primary insomnia    Overactive bladder    Melanoma in situ of left lower leg (H)       Allergies   Allergen Reactions    Gluten      intolerance in foods    Lactose Intolerance (Gi)      intolerance, dairy foods    Seasonal Allergies     Cortisone Dermatitis      When pt takes cortisone, causes perioral dermatitis, per pt.         Current Outpatient Medications   Medication Sig Dispense Refill    cetirizine (ZYRTEC) 10 MG tablet Take 1 tablet (10 mg) by mouth every evening 30 tablet 1    clobetasol (TEMOVATE) 0.05 % external ointment To genital area twice weekly 30 g 4    drospirenone-ethinyl estradiol (GREGORIO) 3-0.03 MG tablet Take 1 tablet by mouth daily Take active pills daily 112 tablet 4    lactase (LACTAID) 3000 UNIT tablet Take 3,000 Units by mouth 3 times daily (with meals)      tacrolimus (PROTOPIC) 0.1 % external ointment To genital area daily 30 g 6    amitriptyline (ELAVIL) 10 MG tablet Take 1-2 tablets (10-20 mg) by mouth nightly as needed for sleep (Patient not taking: Reported on 7/23/2024) 180 tablet 4    hydroquinone (SARAI) 4 % external cream Apply to face nightly for up to 16 weeks. (Patient not taking: Reported on 7/23/2024) 28 g 1     No current facility-administered medications for this visit.       EXAM:  There were no vitals taken for this visit.  GEN: Alert, no distress  SKIN: Full body exam with genitals  --Light brown macules and patches on the lateral cheeks and temples  --Circular atropic patch on the L mid chest 1 cm  --Numerous pink circular atrophic patches on the back at sites of recent skin biopsies  --Circular brown pink atopic patch on the L anterior shin about 2 cm, no repigmentation  --R mid posterior shoulder with an approx 5 mm brown macule, reticulate pigment  --Scattered medium brown macules on the back, chest, arms, legs  --L posterior heel and R mid medial plantar foot with light brown macules  --Fusion of the labia minora to the labia majora. Phimosis of clitoral  mendes. Depigmentation of perineum.   --L anterior thigh with pink brown macule with positive dimple sign  --Linear scar on the R lateral upper arm with small subcutaneous papules along the suture line.       Please do not hesitate to contact me if you have any questions/concerns.     Sincerely,       Abeba Restrepo MD

## 2024-07-23 NOTE — PROGRESS NOTES
Dermatology Problem List:  #. Melanoma in situ   - L lower leg, excited in 6/22  - R upper arm- amelanotic,  2/2024 MMS  #. Dysplastic nevus L chest, moderate  #. Lichen sclerosus et atrophicus diagnosed by biopsy in the patient's 20's.  Initially treated with testosterone and then topical corticosteroids and finally Protopic.  Subsequently followed by Dr. Higgins and Dr. Carolina. Started microneedling with Dr. Stark in 2/24  #.  Tinea pedis.   #.  Onychomycosis.   #.  History of dysplastic nevus.   #.  Benign pigmented nevi.   #.  Keratosis pilaris.   #.  Perioral dermatitis- oral doxycycline  #. Melasma- IPL and hydroquinone    Assessment and Plan:    1. Lichen sclerosus  Chronic. Adhesions of the labia minora and clitoral mendes. Past complication of perioral dermatitis which she partially attributed to topical clobetasol. Has been undergoing multiple treatments with microneedling, PRP, several laser procedures with Ob/GYN.    2. Lentigines and melasma  Aggressive sun protection. Has had IPL. Minimal improvement with hydroquinone previously.     3. MIS x2: No recurrences. Sun protection.     4. History of dysplastic nevi: Mole mapping planned with Dr. Francisco with [facility].     5. Neoplasm of uncertain behavior on the R upper back. Concern for MIS. Biopsy today.     Shave biopsy:  After discussion of benefits and risks including but not limited to bleeding/bruising, pain/swelling, infection, scar, incomplete removal, nerve damage/numbness, recurrence, and non-diagnostic biopsy, written consent, verbal consent and photographs were obtained. Time-out was performed. The area was cleaned with isopropyl alcohol. 1 mL of 1% lidocaine with epinephrine was injected to obtain adequate anesthesia of the lesion on the R upper back. A shave biopsy was performed. Hemostasis was achieved with aluminium chloride. Vaseline and a sterile dressing were applied. The patient tolerated the procedure and no complications were  noted. The patient was provided with verbal and written post care instructions.     F/up planned with [facility] for mole mapping.     Thank you for involving me in this patient's care.     Abeba Resterpo MD   of Dermatology  [facility]      CC: Referred Self, MD  No address on file    Kaley Carolina MD    ____________________________________________________________________________________________________________________________________________    CC: Patient presents with:  RECHECK: 6 month skin check current concerns about spot middle of the back- darker mole in mid back and pink spots on the legs, for her lichen sclerous the [employer] AV it became healthier looking but the lichen sclerous is still there        HPI: Breonna Pinon is a 53 year old female presenting for skin check and lichen sclerosus.  Since last visit she has had MMS of amelanotic MIS on the R upper arm by Dr. Man. She has also had multiple moles biopsied by Dr. Man's team, one of which had moderate atypia. Patient notes that she is worried about the mole on the R upper back as it seems darker than her others and she is very worried given her history of MIS. Notes treatments as above for LS with Dr. Stark. Has had some improvements in the skin texture, but not resolution of adhesions of white coloration.       Patient Active Problem List   Diagnosis    Allergic rhinitis    Atypical glandular cells on cervical Pap smear    CARDIOVASCULAR SCREENING; LDL GOAL LESS THAN 160    Lichen sclerosus et atrophicus    Lactose intolerance    H/O dysplastic nevus    Perioral dermatitis    Primary insomnia    Overactive bladder    Melanoma in situ of left lower leg (H)       Allergies   Allergen Reactions    Gluten      intolerance in foods    Lactose Intolerance (Gi)      intolerance, dairy foods    Seasonal Allergies     Cortisone Dermatitis      When pt takes cortisone, causes perioral dermatitis, per  pt.         Current Outpatient Medications   Medication Sig Dispense Refill    cetirizine (ZYRTEC) 10 MG tablet Take 1 tablet (10 mg) by mouth every evening 30 tablet 1    clobetasol (TEMOVATE) 0.05 % external ointment To genital area twice weekly 30 g 4    drospirenone-ethinyl estradiol (GREGORIO) 3-0.03 MG tablet Take 1 tablet by mouth daily Take active pills daily 112 tablet 4    lactase (LACTAID) 3000 UNIT tablet Take 3,000 Units by mouth 3 times daily (with meals)      tacrolimus (PROTOPIC) 0.1 % external ointment To genital area daily 30 g 6    amitriptyline (ELAVIL) 10 MG tablet Take 1-2 tablets (10-20 mg) by mouth nightly as needed for sleep (Patient not taking: Reported on 7/23/2024) 180 tablet 4    hydroquinone (SARAI) 4 % external cream Apply to face nightly for up to 16 weeks. (Patient not taking: Reported on 7/23/2024) 28 g 1     No current facility-administered medications for this visit.       EXAM:  There were no vitals taken for this visit.  GEN: Alert, no distress  SKIN: Full body exam with genitals  --Light brown macules and patches on the lateral cheeks and temples  --Circular atropic patch on the L mid chest 1 cm  --Numerous pink circular atrophic patches on the back at sites of recent skin biopsies  --Circular brown pink atopic patch on the L anterior shin about 2 cm, no repigmentation  --R mid posterior shoulder with an approx 5 mm brown macule, reticulate pigment  --Scattered medium brown macules on the back, chest, arms, legs  --L posterior heel and R mid medial plantar foot with light brown macules  --Fusion of the labia minora to the labia majora. Phimosis of clitoral mendes. Depigmentation of perineum.   --L anterior thigh with pink brown macule with positive dimple sign  --Linear scar on the R lateral upper arm with small subcutaneous papules along the suture line.

## 2024-07-23 NOTE — NURSING NOTE
The following medication was given:     MEDICATION: 8.4 % Sodium Bicarbonate   ROUTE: IM  SITE: completed by provider   DOSE: 0.25 mL  LOT #: [id number]  :  [employer]   EXPIRATION DATE:  09/2025  NDC#:[address]-5  Marisabel Law MA

## 2024-11-05 ENCOUNTER — TRANSFERRED RECORDS (OUTPATIENT)
Dept: HEALTH INFORMATION MANAGEMENT | Facility: CLINIC | Age: 54
End: 2024-11-05
Payer: COMMERCIAL

## 2024-11-21 ENCOUNTER — TRANSFERRED RECORDS (OUTPATIENT)
Dept: HEALTH INFORMATION MANAGEMENT | Facility: CLINIC | Age: 54
End: 2024-11-21
Payer: COMMERCIAL

## 2024-12-20 ENCOUNTER — MEDICAL CORRESPONDENCE (OUTPATIENT)
Dept: HEALTH INFORMATION MANAGEMENT | Facility: CLINIC | Age: 54
End: 2024-12-20
Payer: COMMERCIAL

## 2024-12-20 ENCOUNTER — TRANSFERRED RECORDS (OUTPATIENT)
Dept: HEALTH INFORMATION MANAGEMENT | Facility: CLINIC | Age: 54
End: 2024-12-20
Payer: COMMERCIAL

## 2025-01-23 ENCOUNTER — TRANSFERRED RECORDS (OUTPATIENT)
Dept: HEALTH INFORMATION MANAGEMENT | Facility: CLINIC | Age: 55
End: 2025-01-23
Payer: COMMERCIAL

## 2025-02-12 ENCOUNTER — TRANSFERRED RECORDS (OUTPATIENT)
Dept: PEDIATRICS | Facility: CLINIC | Age: 55
End: 2025-02-12
Payer: COMMERCIAL

## 2025-03-03 ENCOUNTER — PATIENT OUTREACH (OUTPATIENT)
Dept: CARE COORDINATION | Facility: CLINIC | Age: 55
End: 2025-03-03
Payer: COMMERCIAL

## 2025-03-13 ENCOUNTER — MYC MEDICAL ADVICE (OUTPATIENT)
Dept: PEDIATRICS | Facility: CLINIC | Age: 55
End: 2025-03-13
Payer: COMMERCIAL

## 2025-03-13 SDOH — HEALTH STABILITY: PHYSICAL HEALTH: ON AVERAGE, HOW MANY MINUTES DO YOU ENGAGE IN EXERCISE AT THIS LEVEL?: 60 MIN

## 2025-03-13 SDOH — HEALTH STABILITY: PHYSICAL HEALTH: ON AVERAGE, HOW MANY DAYS PER WEEK DO YOU ENGAGE IN MODERATE TO STRENUOUS EXERCISE (LIKE A BRISK WALK)?: 5 DAYS

## 2025-03-13 ASSESSMENT — SOCIAL DETERMINANTS OF HEALTH (SDOH): HOW OFTEN DO YOU GET TOGETHER WITH FRIENDS OR RELATIVES?: TWICE A WEEK

## 2025-03-13 NOTE — TELEPHONE ENCOUNTER
Future Appointments 3/13/2025 - 9/9/2025        Date Visit Type Length Department Provider     3/18/2025 11:00 AM [facility] PREVENTATIVE ADULT VISIT 30 min EA IM/Kaley Martin MD    Location Instructions:     [facility] is located at [address], just west of the [address] exit off of [address]. Free parking is available; from either [employer] or [address], access the lot by turning onto [address].                      Sharita Posada RN

## 2025-03-18 ENCOUNTER — OFFICE VISIT (OUTPATIENT)
Dept: PEDIATRICS | Facility: CLINIC | Age: 55
End: 2025-03-18
Attending: INTERNAL MEDICINE
Payer: COMMERCIAL

## 2025-03-18 VITALS
HEIGHT: 66 IN | DIASTOLIC BLOOD PRESSURE: 74 MMHG | SYSTOLIC BLOOD PRESSURE: 111 MMHG | OXYGEN SATURATION: 99 % | HEART RATE: 70 BPM | BODY MASS INDEX: 23.78 KG/M2 | WEIGHT: 148 LBS | TEMPERATURE: 97.5 F | RESPIRATION RATE: 16 BRPM

## 2025-03-18 DIAGNOSIS — L90.0 LICHEN SCLEROSUS ET ATROPHICUS: ICD-10-CM

## 2025-03-18 DIAGNOSIS — R87.619 ATYPICAL GLANDULAR CELLS ON CERVICAL PAP SMEAR: ICD-10-CM

## 2025-03-18 DIAGNOSIS — Z30.41 ENCOUNTER FOR SURVEILLANCE OF CONTRACEPTIVE PILLS: ICD-10-CM

## 2025-03-18 DIAGNOSIS — Z00.00 ROUTINE GENERAL MEDICAL EXAMINATION AT A HEALTH CARE FACILITY: Primary | ICD-10-CM

## 2025-03-18 DIAGNOSIS — Z12.4 CERVICAL CANCER SCREENING: ICD-10-CM

## 2025-03-18 PROBLEM — Z86.006 HX OF MELANOMA IN SITU: Status: ACTIVE | Noted: 2023-05-26

## 2025-03-18 PROCEDURE — 99459 PELVIC EXAMINATION: CPT | Performed by: INTERNAL MEDICINE

## 2025-03-18 PROCEDURE — 90471 IMMUNIZATION ADMIN: CPT | Performed by: INTERNAL MEDICINE

## 2025-03-18 PROCEDURE — 3078F DIAST BP <80 MM HG: CPT | Performed by: INTERNAL MEDICINE

## 2025-03-18 PROCEDURE — 3074F SYST BP LT 130 MM HG: CPT | Performed by: INTERNAL MEDICINE

## 2025-03-18 PROCEDURE — 1126F AMNT PAIN NOTED NONE PRSNT: CPT | Performed by: INTERNAL MEDICINE

## 2025-03-18 PROCEDURE — 90677 PCV20 VACCINE IM: CPT | Performed by: INTERNAL MEDICINE

## 2025-03-18 PROCEDURE — 99396 PREV VISIT EST AGE 40-64: CPT | Mod: 25 | Performed by: INTERNAL MEDICINE

## 2025-03-18 RX ORDER — DROSPIRENONE AND ETHINYL ESTRADIOL 0.03MG-3MG
1 KIT ORAL DAILY
Qty: 112 TABLET | Refills: 4 | Status: SHIPPED | OUTPATIENT
Start: 2025-03-18

## 2025-03-18 ASSESSMENT — PAIN SCALES - GENERAL: PAINLEVEL_OUTOF10: NO PAIN (0)

## 2025-03-18 NOTE — PROGRESS NOTES
Preventive Care Visit  [facility] SO Carolina MD, Internal Medicine  Mar 18, 2025      Assessment & Plan   Assessment & Plan  General Health Maintenance  Blood pressure and weight normal. Discussed stress management, vaccination status, and travel plans. Pneumococcal vaccination needed per updated guidelines.  - Administer pneumococcal vaccine.  - Continue stress management strategies.  - Review vaccination status regularly.  -continue routine dermatology follow-up     Atypical Glandular Cells on Cervical Pap Smear  She prefers annual Pap smears   - Continue annual Pap smears as per her preference.    Lichen Sclerosus  She seeks a female OB/GYN with expertise in lichen sclerosus for regular monitoring.  - Provide referrals to OB/GYNs with expertise in lichen sclerosus.  - Encourage consultation with referred OB/GYNs.    Contraceptive Management  She is satisfied with Sandra. Discussed future discontinuation to assess menopausal status. Plan to reassess next year.  - Continue Sandra for contraception.  - Consider discontinuing birth control next year to assess menopausal status.              Consent was obtained from the patient to use an AI documentation tool in the creation of this note.            Counseling  Appropriate preventive services were addressed with this patient via screening, questionnaire, or discussion as appropriate for fall prevention, nutrition, physical activity, Tobacco-use cessation, social engagement, weight loss and cognition.  Checklist reviewing preventive services available has been given to the patient.  Reviewed patient's diet, addressing concerns and/or questions.   She is at risk for psychosocial distress and has been provided with information to reduce risk.       See Patient Instructions    Martha Ravi is a 54 year old, presenting for the following:  Physical        3/18/2025    10:58 AM   Additional Questions   Roomed by sharita   Accompanied by karma          3/18/2025    10:58 AM   Patient Reported Additional Medications   Patient reports taking the following new medications na          HPI  History of Present Illness  The patient, a 54-year-old woman with a history of skin conditions and lichen sclerosis, presents for a routine physical examination. She reports a recent weight loss of four pounds, which she attributes to stress and lifestyle changes, including increased physical activity and smaller portion sizes. She notes that her work in [employer] has been particularly stressful recently, and she is considering looking for a job in a different field. She also mentions that she recently completed her taxes, which added to her stress levels.    The patient reports that she has been managing her skin conditions with various creams prescribed by her dermatologist. She expresses dissatisfaction with her current dermatologist's approach to her lichen sclerosis and requests recommendations for a female OB who specializes in this condition.     She also mentions that she has a large supply of amitriptyline, which she uses occasionally for sleep, but has been primarily using magnesium glycinate, which she reports has been effective until recently due to increased stress.    The patient's father was recently diagnosed with AFib and basal cell carcinoma, but she reports no new health problems in her immediate family. She is currently taking Sandra for birth control and expresses a desire to continue annual Pap smears and 3D mammograms. She is also due for a pneumococcal vaccination.            Advance Care Planning  Patient does not have a Health Care Directive: Patient states has Advance Directive and will bring in a copy to clinic.      3/13/2025   General Health   How would you rate your overall physical health? Good   Feel stress (tense, anxious, or unable to sleep) To some extent   (!) STRESS CONCERN      3/13/2025   Nutrition   Three or more servings of calcium each day? Yes    Diet: Gluten-free/reduced   How many servings of fruit and vegetables per day? 4 or more   How many sweetened beverages each day? 0-1         3/13/2025   Exercise   Days per week of moderate/strenous exercise 5 days   Average minutes spent exercising at this level 60 min         3/13/2025   Social Factors   Frequency of gathering with friends or relatives Twice a week   Worry food won't last until get money to buy more No   Food not last or not have enough money for food? No   Do you have housing? (Housing is defined as stable permanent housing and does not include staying ouside in a car, in a tent, in an abandoned building, in an overnight shelter, or couch-surfing.) Yes   Are you worried about losing your housing? No   Lack of transportation? No   Unable to get utilities (heat,electricity)? No         3/13/2025   Fall Risk   Fallen 2 or more times in the past year? No   Trouble with walking or balance? No          3/13/2025   Dental   Dentist two times every year? Yes           3/8/2024   TB Screening   Were you born outside of the [place]? No           Today's PHQ-2 Score:       3/18/2025     9:59 AM   PHQ-2 ( 1999 [employer])   Q1: Little interest or pleasure in doing things 1   Q2: Feeling down, depressed or hopeless 1   PHQ-2 Score 2    Q1: Little interest or pleasure in doing things Several days   Q2: Feeling down, depressed or hopeless Several days   PHQ-2 Score 2       Patient-reported           3/13/2025   Substance Use   Alcohol more than 3/day or more than 7/wk Not Applicable   Do you use any other substances recreationally? No     Social History     Tobacco Use    Smoking status: Never    Smokeless tobacco: Never   Vaping Use    Vaping status: Never Used   Substance Use Topics    Alcohol use: Not Currently     Comment: rare    Drug use: No           5/20/2022   LAST FHS-7 RESULTS   1st degree relative breast or ovarian cancer Yes   Any relative bilateral breast cancer No   Any male have breast cancer No   Any  "ONE woman have BOTH breast AND ovarian cancer No   Any woman with breast cancer before 50yrs No   2 or more relatives with breast AND/OR ovarian cancer No   2 or more relatives with breast AND/OR bowel cancer No        Mammogram Screening - Mammogram every 1-2 years updated in Health Maintenance based on mutual decision making        3/13/2025   STI Screening   New sexual partner(s) since last STI/HIV test? No     History of abnormal Pap smear: YES - reflected in Problem List and Health Maintenance accordingly        Latest Ref Rng & Units 3/15/2024     3:08 PM 5/26/2023     2:40 PM 4/5/2019     9:50 AM   PAP / HPV   PAP  Negative for Intraepithelial Lesion or Malignancy (NILM)  Atypical glandular cells, not otherwise specified     HPV 16 DNA Negative Negative  Negative  Negative    HPV 18 DNA Negative Negative  Negative  Negative    Other HR HPV Negative Negative  Negative  Negative      ASCVD Risk   The 10-year ASCVD risk score (Lorenzo MORIN, et al., 2019) is: 0.7%    Values used to calculate the score:      Age: 54 years      Sex: Female      Is Non- : No      Diabetic: No      Tobacco smoker: No      Systolic Blood Pressure: 111 mmHg      Is BP treated: No      HDL Cholesterol: 64 mg/dL      Total Cholesterol: 152 mg/dL           Reviewed and updated as needed this visit by Provider     Meds  Problems    Fam Hx            Labs reviewed in EPIC         Objective    Exam  /74   Pulse 70   Temp 97.5  F (36.4  C) (Temporal)   Resp 16   Ht 1.664 m (5' 5.5\")   Wt 67.1 kg (148 lb)   SpO2 99%   BMI 24.25 kg/m     Estimated body mass index is 24.25 kg/m  as calculated from the following:    Height as of this encounter: 1.664 m (5' 5.5\").    Weight as of this encounter: 67.1 kg (148 lb).    Physical Exam  GENERAL: alert and no distress  EYES: Eyes grossly normal to inspection, PERRL and conjunctivae and sclerae normal  HENT: ear canals and TM's normal, nose and mouth without " ulcers or lesions  NECK: no adenopathy, no asymmetry, masses, or scars  RESP: lungs clear to auscultation - no rales, rhonchi or wheezes  CV: regular rate and rhythm, normal S1 S2, no S3 or S4, no murmur, click or rub, no peripheral edema  ABDOMEN: soft, nontender, no hepatosplenomegaly, no masses and bowel sounds normal   (female): fused labia minora without significant plaques, normal urethral meatus, normal vaginal mucosa  MS: no gross musculoskeletal defects noted, no edema  SKIN: no suspicious lesions or rashes  NEURO: Normal strength and tone, mentation intact and speech normal  PSYCH: mentation appears normal, affect normal/bright        Signed Electronically by: Kaley Carolina MD

## 2025-03-18 NOTE — PATIENT INSTRUCTIONS
For OB-gyn:  Dr. Geetha Qiu      Patient Education   Preventive Care Advice   This is general advice given by our system to help you stay healthy. However, your care team may have specific advice just for you. Please talk to your care team about your preventive care needs.  Nutrition  Eat 5 or more servings of fruits and vegetables each day.  Try wheat bread, brown rice and whole grain pasta (instead of white bread, rice, and pasta).  Get enough calcium and vitamin D. Check the label on foods and aim for 100% of the RDA (recommended daily allowance).  Lifestyle  Exercise at least 150 minutes each week  (30 minutes a day, 5 days a week).  Do muscle strengthening activities 2 days a week. These help control your weight and prevent disease.  No smoking.  Wear sunscreen to prevent skin cancer.  Have a dental exam and cleaning every 6 months.  Yearly exams  See your health care team every year to talk about:  Any changes in your health.  Any medicines your care team has prescribed.  Preventive care, family planning, and ways to prevent chronic diseases.  Shots (vaccines)   HPV shots (up to age 26), if you've never had them before.  Hepatitis B shots (up to age 59), if you've never had them before.  COVID-19 shot: Get this shot when it's due.  Flu shot: Get a flu shot every year.  Tetanus shot: Get a tetanus shot every 10 years.  Pneumococcal, hepatitis A, and RSV shots: Ask your care team if you need these based on your risk.  Shingles shot (for age 50 and up)  General health tests  Diabetes screening:  Starting at age 35, Get screened for diabetes at least every 3 years.  If you are younger than age 35, ask your care team if you should be screened for diabetes.  Cholesterol test: At age 39, start having a cholesterol test every 5 years, or more often if advised.  Bone density scan (DEXA): At age 50, ask your care team if you should have this scan for osteoporosis (brittle bones).  Hepatitis C: Get  tested at least once in your life.  STIs (sexually transmitted infections)  Before age 24: Ask your care team if you should be screened for STIs.  After age 24: Get screened for STIs if you're at risk. You are at risk for STIs (including HIV) if:  You are sexually active with more than one person.  You don't use condoms every time.  You or a partner was diagnosed with a sexually transmitted infection.  If you are at risk for HIV, ask about PrEP medicine to prevent HIV.  Get tested for HIV at least once in your life, whether you are at risk for HIV or not.  Cancer screening tests  Cervical cancer screening: If you have a cervix, begin getting regular cervical cancer screening tests starting at age 21.  Breast cancer scan (mammogram): If you've ever had breasts, begin having regular mammograms starting at age 40. This is a scan to check for breast cancer.  Colon cancer screening: It is important to start screening for colon cancer at age 45.  Have a colonoscopy test every 10 years (or more often if you're at risk) Or, ask your provider about stool tests like a FIT test every year or Cologuard test every 3 years.  To learn more about your testing options, visit:   .  For help making a decision, visit:   https://bit.ly/wj61303.  Prostate cancer screening test: If you have a prostate, ask your care team if a prostate cancer screening test (PSA) at age 55 is right for you.  Lung cancer screening: If you are a current or former smoker ages 50 to 80, ask your care team if ongoing lung cancer screenings are right for you.  For informational purposes only. Not to replace the advice of your health care provider. Copyright   2023 [facility]. All rights reserved. Clinically reviewed by the [facility] Transitions Program. [employer] 150530 - REV 01/24.  Learning About Stress  What is stress?     Stress is your body's response to a hard situation. Your body can have a physical, emotional, or mental response.  Stress is a fact of life for most people, and it affects everyone differently. What causes stress for you may not be stressful for someone else.  A lot of things can cause stress. You may feel stress when you go on a job interview, take a test, or run a race. This kind of short-term stress is normal and even useful. It can help you if you need to work hard or react quickly. For example, stress can help you finish an important job on time.  Long-term stress is caused by ongoing stressful situations or events. Examples of long-term stress include long-term health problems, ongoing problems at work, or conflicts in your family. Long-term stress can harm your health.  How does stress affect your health?  When you are stressed, your body responds as though you are in danger. It makes hormones that speed up your heart, make you breathe faster, and give you a burst of energy. This is called the fight-or-flight stress response. If the stress is over quickly, your body goes back to normal and no harm is done.  But if stress happens too often or lasts too long, it can have bad effects. Long-term stress can make you more likely to get sick, and it can make symptoms of some diseases worse. If you tense up when you are stressed, you may develop neck, shoulder, or low back pain. Stress is linked to high blood pressure and heart disease.  Stress also harms your emotional health. It can make you fish, tense, or depressed. Your relationships may suffer, and you may not do well at work or school.  What can you do to manage stress?  You can try these things to help manage stress:   Do something active. Exercise or activity can help reduce stress. Walking is a great way to get started. Even everyday activities such as housecleaning or yard work can help.  Try yoga or aguilar chi. These techniques combine exercise and meditation. You may need some training at first to learn them.  Do something you enjoy. For example, listen to music or go  "to a movie. Practice your hobby or do volunteer work.  Meditate. This can help you relax, because you are not worrying about what happened before or what may happen in the future.  Do guided imagery. Imagine yourself in any setting that helps you feel calm. You can use online videos, books, or a teacher to guide you.  Do breathing exercises. For example:  From a standing position, bend forward from the waist with your knees slightly bent. Let your arms dangle close to the floor.  Breathe in slowly and deeply as you return to a standing position. Roll up slowly and lift your head last.  Hold your breath for just a few seconds in the standing position.  Breathe out slowly and bend forward from the waist.  Let your feelings out. Talk, laugh, cry, and express anger when you need to. Talking with supportive friends or family, a counselor, or a jenifer leader about your feelings is a healthy way to relieve stress. Avoid discussing your feelings with people who make you feel worse.  Write. It may help to write about things that are bothering you. This helps you find out how much stress you feel and what is causing it. When you know this, you can find better ways to cope.  What can you do to prevent stress?  You might try some of these things to help prevent stress:  Manage your time. This helps you find time to do the things you want and need to do.  Get enough sleep. Your body recovers from the stresses of the day while you are sleeping.  Get support. Your family, friends, and community can make a difference in how you experience stress.  Limit your news feed. Avoid or limit time on social media or news that may make you feel stressed.  Do something active. Exercise or activity can help reduce stress. Walking is a great way to get started.  Where can you learn more?  Go to https://www.healthwise.net/patiented  Enter N032 in the search box to learn more about \"Learning About Stress.\"  Current as of: October 24, 2024  Content " Version: 14.4    4888-6638 [employer].   Care instructions adapted under license by your healthcare professional. If you have questions about a medical condition or this instruction, always ask your healthcare professional. [employer] disclaims any warranty or liability for your use of this information.

## 2025-03-20 LAB
HPV HR 12 DNA CVX QL NAA+PROBE: NEGATIVE
HPV16 DNA CVX QL NAA+PROBE: NEGATIVE
HPV18 DNA CVX QL NAA+PROBE: NEGATIVE
HUMAN PAPILLOMA VIRUS FINAL DIAGNOSIS: NORMAL

## 2025-04-04 ENCOUNTER — ANCILLARY PROCEDURE (OUTPATIENT)
Dept: MAMMOGRAPHY | Facility: CLINIC | Age: 55
End: 2025-04-04
Attending: INTERNAL MEDICINE
Payer: COMMERCIAL

## 2025-04-04 DIAGNOSIS — Z12.31 VISIT FOR SCREENING MAMMOGRAM: ICD-10-CM

## 2025-04-04 PROCEDURE — 77063 BREAST TOMOSYNTHESIS BI: CPT | Mod: TC | Performed by: RADIOLOGY

## 2025-04-04 PROCEDURE — 77067 SCR MAMMO BI INCL CAD: CPT | Mod: TC | Performed by: RADIOLOGY

## 2025-05-03 ENCOUNTER — E-VISIT (OUTPATIENT)
Dept: PEDIATRICS | Facility: CLINIC | Age: 55
End: 2025-05-03
Payer: COMMERCIAL

## 2025-05-03 DIAGNOSIS — R31.0 GROSS HEMATURIA: Primary | ICD-10-CM

## 2025-05-04 NOTE — TELEPHONE ENCOUNTER
Provider E-Visit time total (minutes):  15 minutes    Iga nephropathy, think about liver disease, celiac, HIV, monoclonal gammopathy, other renal disease

## 2025-05-04 NOTE — PATIENT INSTRUCTIONS
Thank you for choosing us for your care. Given your symptoms, I would like you to do a lab-only visit to determine what is causing them.  I have placed the orders.  Please schedule an appointment with the lab right here in [facility], or call [phone].  I will let you know when the results are back and next steps to take.    Schedule a Lab Only appointment here.

## 2025-05-05 ENCOUNTER — HOSPITAL ENCOUNTER (OUTPATIENT)
Dept: CT IMAGING | Facility: HOSPITAL | Age: 55
Discharge: HOME | End: 2025-05-05
Attending: INTERNAL MEDICINE | Admitting: INTERNAL MEDICINE
Payer: COMMERCIAL

## 2025-05-05 ENCOUNTER — LAB (OUTPATIENT)
Dept: LAB | Facility: CLINIC | Age: 55
End: 2025-05-05
Payer: COMMERCIAL

## 2025-05-05 DIAGNOSIS — R31.0 GROSS HEMATURIA: ICD-10-CM

## 2025-05-05 LAB
ALBUMIN UR-MCNC: ABNORMAL MG/DL
APPEARANCE UR: ABNORMAL
BACTERIA #/AREA URNS HPF: ABNORMAL /HPF
BILIRUB UR QL STRIP: NEGATIVE
COLOR UR AUTO: ABNORMAL
GLUCOSE UR STRIP-MCNC: NEGATIVE MG/DL
HGB UR QL STRIP: ABNORMAL
KETONES UR STRIP-MCNC: NEGATIVE MG/DL
LEUKOCYTE ESTERASE UR QL STRIP: ABNORMAL
NITRATE UR QL: NEGATIVE
PH UR STRIP: 6 [PH] (ref 5–7)
RBC #/AREA URNS AUTO: ABNORMAL /HPF
SP GR UR STRIP: <=1.005 (ref 1–1.03)
SQUAMOUS #/AREA URNS AUTO: ABNORMAL /LPF
UROBILINOGEN UR STRIP-ACNC: 0.2 E.U./DL
WBC #/AREA URNS AUTO: ABNORMAL /HPF

## 2025-05-05 PROCEDURE — 81001 URINALYSIS AUTO W/SCOPE: CPT

## 2025-05-05 PROCEDURE — 250N000011 HC RX IP 250 OP 636: Performed by: INTERNAL MEDICINE

## 2025-05-05 PROCEDURE — 82043 UR ALBUMIN QUANTITATIVE: CPT

## 2025-05-05 PROCEDURE — 74178 CT ABD&PLV WO CNTR FLWD CNTR: CPT

## 2025-05-05 PROCEDURE — 87086 URINE CULTURE/COLONY COUNT: CPT

## 2025-05-05 PROCEDURE — 82570 ASSAY OF URINE CREATININE: CPT

## 2025-05-05 PROCEDURE — 250N000009 HC RX 250: Performed by: INTERNAL MEDICINE

## 2025-05-05 RX ORDER — IOPAMIDOL 755 MG/ML
90 INJECTION, SOLUTION INTRAVASCULAR ONCE
Status: COMPLETED | OUTPATIENT
Start: 2025-05-05 | End: 2025-05-05

## 2025-05-05 RX ADMIN — SODIUM CHLORIDE 95 ML: 9 INJECTION, SOLUTION INTRAVENOUS at 18:27

## 2025-05-05 RX ADMIN — IOPAMIDOL 90 ML: 755 INJECTION, SOLUTION INTRAVENOUS at 18:25

## 2025-05-06 ENCOUNTER — LAB (OUTPATIENT)
Dept: LAB | Facility: CLINIC | Age: 55
End: 2025-05-06
Payer: COMMERCIAL

## 2025-05-06 DIAGNOSIS — R31.0 GROSS HEMATURIA: ICD-10-CM

## 2025-05-06 LAB
ALBUMIN UR-MCNC: NEGATIVE MG/DL
APPEARANCE UR: CLEAR
BACTERIA #/AREA URNS HPF: ABNORMAL /HPF
BACTERIA UR CULT: NORMAL
BILIRUB UR QL STRIP: NEGATIVE
COLOR UR AUTO: YELLOW
CREAT UR-MCNC: 20.6 MG/DL
CREAT UR-MCNC: 23.5 MG/DL
ERYTHROCYTE [DISTWIDTH] IN BLOOD BY AUTOMATED COUNT: 12.3 % (ref 10–15)
GLUCOSE UR STRIP-MCNC: NEGATIVE MG/DL
HCT VFR BLD AUTO: 39.1 % (ref 35–47)
HGB BLD-MCNC: 12.6 G/DL (ref 11.7–15.7)
HGB UR QL STRIP: ABNORMAL
KETONES UR STRIP-MCNC: 15 MG/DL
LEUKOCYTE ESTERASE UR QL STRIP: ABNORMAL
MCH RBC QN AUTO: 29.3 PG (ref 26.5–33)
MCHC RBC AUTO-ENTMCNC: 32.2 G/DL (ref 31.5–36.5)
MCV RBC AUTO: 91 FL (ref 78–100)
MICROALBUMIN UR-MCNC: 26 MG/L
MICROALBUMIN UR-MCNC: <12 MG/L
MICROALBUMIN/CREAT UR: 126.21 MG/G CR (ref 0–25)
MICROALBUMIN/CREAT UR: NORMAL MG/G{CREAT}
NITRATE UR QL: NEGATIVE
PH UR STRIP: 6 [PH] (ref 5–7)
PLATELET # BLD AUTO: 301 10E3/UL (ref 150–450)
RBC # BLD AUTO: 4.3 10E6/UL (ref 3.8–5.2)
RBC #/AREA URNS AUTO: ABNORMAL /HPF
SP GR UR STRIP: <=1.005 (ref 1–1.03)
SQUAMOUS #/AREA URNS AUTO: ABNORMAL /LPF
UROBILINOGEN UR STRIP-ACNC: 0.2 E.U./DL
WBC # BLD AUTO: 8.5 10E3/UL (ref 4–11)
WBC #/AREA URNS AUTO: ABNORMAL /HPF

## 2025-05-06 PROCEDURE — 81001 URINALYSIS AUTO W/SCOPE: CPT

## 2025-05-06 PROCEDURE — 85027 COMPLETE CBC AUTOMATED: CPT

## 2025-05-06 PROCEDURE — 82570 ASSAY OF URINE CREATININE: CPT

## 2025-05-06 PROCEDURE — 82043 UR ALBUMIN QUANTITATIVE: CPT

## 2025-05-06 PROCEDURE — 36415 COLL VENOUS BLD VENIPUNCTURE: CPT

## 2025-05-06 PROCEDURE — 80048 BASIC METABOLIC PNL TOTAL CA: CPT

## 2025-05-07 ENCOUNTER — RESULTS FOLLOW-UP (OUTPATIENT)
Dept: PEDIATRICS | Facility: CLINIC | Age: 55
End: 2025-05-07

## 2025-05-07 LAB
ANION GAP SERPL CALCULATED.3IONS-SCNC: 12 MMOL/L (ref 7–15)
BUN SERPL-MCNC: 9.8 MG/DL (ref 6–20)
CALCIUM SERPL-MCNC: 9.1 MG/DL (ref 8.8–10.4)
CHLORIDE SERPL-SCNC: 106 MMOL/L (ref 98–107)
CREAT SERPL-MCNC: 0.7 MG/DL (ref 0.51–0.95)
EGFRCR SERPLBLD CKD-EPI 2021: >90 ML/MIN/1.73M2
GLUCOSE SERPL-MCNC: 89 MG/DL (ref 70–99)
HCO3 SERPL-SCNC: 21 MMOL/L (ref 22–29)
POTASSIUM SERPL-SCNC: 3.9 MMOL/L (ref 3.4–5.3)
SODIUM SERPL-SCNC: 139 MMOL/L (ref 135–145)

## 2025-05-12 ENCOUNTER — OFFICE VISIT (OUTPATIENT)
Dept: UROLOGY | Facility: CLINIC | Age: 55
End: 2025-05-12
Attending: INTERNAL MEDICINE
Payer: COMMERCIAL

## 2025-05-12 VITALS
BODY MASS INDEX: 23.78 KG/M2 | HEIGHT: 66 IN | DIASTOLIC BLOOD PRESSURE: 76 MMHG | SYSTOLIC BLOOD PRESSURE: 116 MMHG | WEIGHT: 148 LBS

## 2025-05-12 DIAGNOSIS — R31.0 GROSS HEMATURIA: ICD-10-CM

## 2025-05-12 DIAGNOSIS — N32.9 LESION OF BLADDER: ICD-10-CM

## 2025-05-12 DIAGNOSIS — Z79.2 PROPHYLACTIC ANTIBIOTIC: Primary | ICD-10-CM

## 2025-05-12 LAB
ALBUMIN UR-MCNC: NEGATIVE MG/DL
APPEARANCE UR: CLEAR
BILIRUB UR QL STRIP: NEGATIVE
COLOR UR AUTO: YELLOW
GLUCOSE UR STRIP-MCNC: NEGATIVE MG/DL
HGB UR QL STRIP: ABNORMAL
KETONES UR STRIP-MCNC: NEGATIVE MG/DL
LEUKOCYTE ESTERASE UR QL STRIP: ABNORMAL
NITRATE UR QL: NEGATIVE
PH UR STRIP: 7 [PH] (ref 5–7)
SP GR UR STRIP: 1.02 (ref 1–1.03)
UROBILINOGEN UR STRIP-ACNC: 0.2 E.U./DL

## 2025-05-12 PROCEDURE — 52000 CYSTOURETHROSCOPY: CPT | Performed by: UROLOGY

## 2025-05-12 PROCEDURE — 81003 URINALYSIS AUTO W/O SCOPE: CPT | Mod: QW | Performed by: UROLOGY

## 2025-05-12 PROCEDURE — 99204 OFFICE O/P NEW MOD 45 MIN: CPT | Mod: 25 | Performed by: UROLOGY

## 2025-05-12 PROCEDURE — 3078F DIAST BP <80 MM HG: CPT | Performed by: UROLOGY

## 2025-05-12 PROCEDURE — 1126F AMNT PAIN NOTED NONE PRSNT: CPT | Performed by: UROLOGY

## 2025-05-12 PROCEDURE — 3074F SYST BP LT 130 MM HG: CPT | Performed by: UROLOGY

## 2025-05-12 RX ORDER — CEFAZOLIN SODIUM 2 G/50ML
2 SOLUTION INTRAVENOUS
OUTPATIENT
Start: 2025-05-12

## 2025-05-12 RX ORDER — MAGNESIUM GLYCINATE 100 MG
100 CAPSULE ORAL DAILY
COMMUNITY

## 2025-05-12 RX ORDER — ACETAMINOPHEN 650 MG/1
650 SUPPOSITORY RECTAL ONCE
OUTPATIENT
Start: 2025-05-12

## 2025-05-12 RX ORDER — ACETAMINOPHEN 325 MG/1
975 TABLET ORAL ONCE
OUTPATIENT
Start: 2025-05-12 | End: 2025-05-12

## 2025-05-12 RX ORDER — CIPROFLOXACIN 500 MG/1
500 TABLET, FILM COATED ORAL ONCE
Qty: 1 TABLET | Refills: 0 | Status: SHIPPED | OUTPATIENT
Start: 2025-05-12 | End: 2025-05-12

## 2025-05-12 RX ORDER — CEFAZOLIN SODIUM 2 G/50ML
2 SOLUTION INTRAVENOUS SEE ADMIN INSTRUCTIONS
OUTPATIENT
Start: 2025-05-12

## 2025-05-12 ASSESSMENT — PAIN SCALES - GENERAL: PAINLEVEL_OUTOF10: NO PAIN (0)

## 2025-05-12 NOTE — PROGRESS NOTES
[facility]  Main Office: [address]  [address]  [place] [address]       CHIEF COMPLAINT:   Gross hematuria    HISTORY:   I was asked by Dr. Carolina to see this 54-year-old woman with painless gross hematuria.  She has had 2 separate episodes of painless gross hematuria.  She has no prior history of kidney stones.  She has no history of flank pain.  She has normal kidney function.  She has had a CT urogram performed already that has shown no upper urinary tract abnormalities.  She is here today for a cystoscopy in order to complete her hematuria workup.      PAST MEDICAL HISTORY:   Past Medical History:   Diagnosis Date    Abnormal Pap smear     Allergic rhinitis, cause unspecified     Anemia     Fertility problem     Malignant melanoma (H)     Other acne        PAST SURGICAL HISTORY:     Past Surgical History:   Procedure Laterality Date    BIOPSY OF SKIN LESION  2014    COLONOSCOPY N/A 2022    Procedure: COLONOSCOPY (fv);  Surgeon: Boris Carlos MD;  Location:  GI    COLPOSCOPY,LOOP ELECTRD CERVIX EXCIS  2005    CYSTOSCOPY      D & C  2010    suction D&C for missed     HC REMOVAL GALLBLADDER  2003    HC REMOVE TONSILS/ADENOIDS,<13 Y/O      LAPAROSCOPY PROCEDURE UNLISTED  2010    removal of R ampullary ectopic pregnancy    LASIK         FAMILY HISTORY:   Family History   Problem Relation Age of Onset    Hypertension Mother     Alcohol/Drug Father     Atrial fibrillation Father     Basal cell carcinoma Father     Alcohol/Drug Maternal Grandmother     Cancer Maternal Grandmother         lung/liver    Alcohol/Drug Maternal Grandfather     Cancer Maternal Grandfather         lung/liver    Cancer Paternal Grandmother         brain    Breast Cancer Maternal Aunt     Breast Cancer Other     Colon Cancer No family hx of        SOCIAL HISTORY:   Social History     Tobacco Use    Smoking status: Never    Smokeless tobacco: Never   Substance Use Topics    Alcohol use: Not  Currently     Comment: rare        ALLERGIES:  Allergies   Allergen Reactions    Gluten      intolerance in foods    Lactose Intolerance (Gi)      intolerance, dairy foods    Seasonal Allergies     Cortisone Dermatitis      When pt takes cortisone, causes perioral dermatitis, per pt.       MEDICATIONS:     Current Outpatient Medications:     cetirizine (ZYRTEC) 10 MG tablet, Take 1 tablet (10 mg) by mouth every evening, Disp: 30 tablet, Rfl: 1    clobetasol (TEMOVATE) 0.05 % external ointment, To genital area twice weekly, Disp: 30 g, Rfl: 4    drospirenone-ethinyl estradiol (GREGORIO) 3-0.03 MG tablet, Take 1 tablet by mouth daily. Take active pills daily, Disp: 112 tablet, Rfl: 4    hydroquinone (SARAI) 4 % external cream, Apply to face nightly for up to 16 weeks., Disp: 28 g, Rfl: 1    lactase (LACTAID) 3000 UNIT tablet, Take 3,000 Units by mouth 3 times daily (with meals), Disp: , Rfl:     magnesium glycinate 100 MG CAPS capsule, Take 100 mg by mouth daily., Disp: , Rfl:     tacrolimus (PROTOPIC) 0.1 % external ointment, To genital area daily, Disp: 30 g, Rfl: 6    amitriptyline (ELAVIL) 10 MG tablet, Take 1-2 tablets (10-20 mg) by mouth nightly as needed for sleep (Patient not taking: Reported on 5/12/2025), Disp: 180 tablet, Rfl: 4    REVIEW OF SYSTEMS:  Allergic/Immunologic: negative  Constitutional Symptoms: negative   Fever: none   Weight loss: none   Other: none  Hematologic/Lymphatic: negative  Integumentary: negative   Breast: negative   Hair: negative   Skin: negative   Skin: negative  Eyes: negative  Ears/Nose/Throat: negative  Respiratory: negative  Cardiovascular: negative  Gastrointestinal: negative  Genitourinary: negative  Musculoskeletal: negative  Neurologic: negative  Psychiatric: negative  Reproductive System: negative  Endocrine: negative      PHYSICAL EXAM:  VS: HR: Data Unavailable    BP: Data Unavailable      WT: 0 lbs 0 oz       HT: Data Unavailable    General appearance: In NAD,  conversant  HEENT: normocephalic and atraumatic, anicteric sclera  Lymph Nodes: not examined  Cardiovascular: Heart regular rate and rhythm without murmurs rubs or gallops  Respiratory: normal, non-labored breathing.  Lungs clear to auscultation bilaterally.  Abdomen: soft, non-tender, and non-distended  Back/Flank: not examined  Peripheral Vascular/extremity: no peripheral edema  Lymphatic: not examined  Skin: Normal temperature, turgor, and texture. No rash  Psychiatric: Appropriate affect. Alert and Oriented to person, place, and time    Pelvic:    External genitalia: Normal   Urethra: Normal   Vagina: Normal vaginal mucosa      Cystoscopy: I performed flexible cystoscopy and there was a very small papillary lesion seen on the right side of the bladder just above the right ureteral orifice.  The rest of the bladder was normal throughout.    Laboratory Studies:     Imaging Studies: I personally viewed her CT scan images.  Normal upper urinary tracts.  No hydronephrosis and no kidney stones.      CLINICAL IMPRESSION:   Bladder lesion  Gross hematuria    PLAN:   She had a very small lesion seen in the bladder on cystoscopy today.  Based on appearance, I would favor that this is a benign PUNLMP but he requires excisional biopsy in the operating room to remove the lesion and to rule out a low-grade urothelial carcinoma.  We discussed the differential diagnosis and the recommended procedure.  All of her questions were answered and she wishes to proceed.  We will get her set up for bladder biopsy in the operating room as an outpatient in the near future.      Micky Bautista M.D.

## 2025-05-12 NOTE — LETTER
2025       RE: Breonna Pinon  [address]  [place] [address]     Dear Colleague,    Thank you for referring your patient, Breonna Pinon, to the [facility] [place] [facility]. Please see a copy of my visit note below.    [facility]  Main Office: [address]  [address]  [place] [address]       CHIEF COMPLAINT:   Gross hematuria    HISTORY:   I was asked by Dr. Carolina to see this 54-year-old woman with painless gross hematuria.  She has had 2 separate episodes of painless gross hematuria.  She has no prior history of kidney stones.  She has no history of flank pain.  She has normal kidney function.  She has had a CT urogram performed already that has shown no upper urinary tract abnormalities.  She is here today for a cystoscopy in order to complete her hematuria workup.      PAST MEDICAL HISTORY:   Past Medical History:   Diagnosis Date     Abnormal Pap smear      Allergic rhinitis, cause unspecified      Anemia      Fertility problem      Malignant melanoma (H)      Other acne        PAST SURGICAL HISTORY:     Past Surgical History:   Procedure Laterality Date     BIOPSY OF SKIN LESION  2014     COLONOSCOPY N/A 2022    Procedure: COLONOSCOPY (fv);  Surgeon: Boris Carlos MD;  Location:  GI     COLPOSCOPY,LOOP ELECTRD CERVIX EXCIS  2005     CYSTOSCOPY       D & C  2010    suction D&C for missed      HC REMOVAL GALLBLADDER  2003     HC REMOVE TONSILS/ADENOIDS,<13 Y/O       LAPAROSCOPY PROCEDURE UNLISTED  2010    removal of R ampullary ectopic pregnancy     LASIK         FAMILY HISTORY:   Family History   Problem Relation Age of Onset     Hypertension Mother      Alcohol/Drug Father      Atrial fibrillation Father      Basal cell carcinoma Father      Alcohol/Drug Maternal Grandmother      Cancer Maternal Grandmother         lung/liver     Alcohol/Drug Maternal Grandfather       Cancer Maternal Grandfather         lung/liver     Cancer Paternal Grandmother         brain     Breast Cancer Maternal Aunt      Breast Cancer Other      Colon Cancer No family hx of        SOCIAL HISTORY:   Social History     Tobacco Use     Smoking status: Never     Smokeless tobacco: Never   Substance Use Topics     Alcohol use: Not Currently     Comment: rare        ALLERGIES:  Allergies   Allergen Reactions     Gluten      intolerance in foods     Lactose Intolerance (Gi)      intolerance, dairy foods     Seasonal Allergies      Cortisone Dermatitis      When pt takes cortisone, causes perioral dermatitis, per pt.       MEDICATIONS:     Current Outpatient Medications:      cetirizine (ZYRTEC) 10 MG tablet, Take 1 tablet (10 mg) by mouth every evening, Disp: 30 tablet, Rfl: 1     clobetasol (TEMOVATE) 0.05 % external ointment, To genital area twice weekly, Disp: 30 g, Rfl: 4     drospirenone-ethinyl estradiol (GREGORIO) 3-0.03 MG tablet, Take 1 tablet by mouth daily. Take active pills daily, Disp: 112 tablet, Rfl: 4     hydroquinone (SARAI) 4 % external cream, Apply to face nightly for up to 16 weeks., Disp: 28 g, Rfl: 1     lactase (LACTAID) 3000 UNIT tablet, Take 3,000 Units by mouth 3 times daily (with meals), Disp: , Rfl:      magnesium glycinate 100 MG CAPS capsule, Take 100 mg by mouth daily., Disp: , Rfl:      tacrolimus (PROTOPIC) 0.1 % external ointment, To genital area daily, Disp: 30 g, Rfl: 6     amitriptyline (ELAVIL) 10 MG tablet, Take 1-2 tablets (10-20 mg) by mouth nightly as needed for sleep (Patient not taking: Reported on 5/12/2025), Disp: 180 tablet, Rfl: 4    REVIEW OF SYSTEMS:  Allergic/Immunologic: negative  Constitutional Symptoms: negative   Fever: none   Weight loss: none   Other: none  Hematologic/Lymphatic: negative  Integumentary: negative   Breast: negative   Hair: negative   Skin: negative   Skin: negative  Eyes: negative  Ears/Nose/Throat: negative  Respiratory:  negative  Cardiovascular: negative  Gastrointestinal: negative  Genitourinary: negative  Musculoskeletal: negative  Neurologic: negative  Psychiatric: negative  Reproductive System: negative  Endocrine: negative      PHYSICAL EXAM:  VS: HR: Data Unavailable    BP: Data Unavailable      WT: 0 lbs 0 oz       HT: Data Unavailable    General appearance: In NAD, conversant  HEENT: normocephalic and atraumatic, anicteric sclera  Lymph Nodes: not examined  Cardiovascular: Heart regular rate and rhythm without murmurs rubs or gallops  Respiratory: normal, non-labored breathing.  Lungs clear to auscultation bilaterally.  Abdomen: soft, non-tender, and non-distended  Back/Flank: not examined  Peripheral Vascular/extremity: no peripheral edema  Lymphatic: not examined  Skin: Normal temperature, turgor, and texture. No rash  Psychiatric: Appropriate affect. Alert and Oriented to person, place, and time    Pelvic:    External genitalia: Normal   Urethra: Normal   Vagina: Normal vaginal mucosa      Cystoscopy: I performed flexible cystoscopy and there was a very small papillary lesion seen on the right side of the bladder just above the right ureteral orifice.  The rest of the bladder was normal throughout.    Laboratory Studies:     Imaging Studies: I personally viewed her CT scan images.  Normal upper urinary tracts.  No hydronephrosis and no kidney stones.      CLINICAL IMPRESSION:   Bladder lesion  Gross hematuria    PLAN:   She had a very small lesion seen in the bladder on cystoscopy today.  Based on appearance, I would favor that this is a benign PUNLMP but he requires excisional biopsy in the operating room to remove the lesion and to rule out a low-grade urothelial carcinoma.  We discussed the differential diagnosis and the recommended procedure.  All of her questions were answered and she wishes to proceed.  We will get her set up for bladder biopsy in the operating room as an outpatient in the near  future.      Micky Bautista M.D.      Again, thank you for allowing me to participate in the care of your patient.      Sincerely,    Micky Bautista MD

## 2025-05-12 NOTE — PATIENT INSTRUCTIONS

## 2025-05-12 NOTE — NURSING NOTE
Chief Complaint   Patient presents with    Gross Hematuria     Pt here for in office cystoscopy      Prior to the start of the procedure and with procedural staff participation, I verbally confirmed the patient s identity using two indicators, relevant allergies, that the procedure was appropriate and matched the consent or emergent situation, and that the correct equipment/implants were available. Immediately prior to starting the procedure I conducted the Time Out with the procedural staff and re-confirmed the patient s name, procedure, and site/side. I have wiped the patient off with the povidone-Iodine solution, draped them,  used Lidocaine hydrochloride jelly, and instilled sterile water into the bladder. (The Joint Commission universal protocol was followed.)  Yes    Sedation (Moderate or Deep): None     Chantel Gonzales CMA

## 2025-05-14 ENCOUNTER — OFFICE VISIT (OUTPATIENT)
Dept: PEDIATRICS | Facility: CLINIC | Age: 55
End: 2025-05-14
Payer: COMMERCIAL

## 2025-05-14 VITALS
HEIGHT: 65 IN | RESPIRATION RATE: 16 BRPM | WEIGHT: 143 LBS | SYSTOLIC BLOOD PRESSURE: 110 MMHG | DIASTOLIC BLOOD PRESSURE: 68 MMHG | HEART RATE: 69 BPM | BODY MASS INDEX: 23.82 KG/M2 | OXYGEN SATURATION: 98 % | TEMPERATURE: 97 F

## 2025-05-14 DIAGNOSIS — R31.0 GROSS HEMATURIA: ICD-10-CM

## 2025-05-14 DIAGNOSIS — Z01.818 PREOP GENERAL PHYSICAL EXAM: Primary | ICD-10-CM

## 2025-05-14 DIAGNOSIS — F51.01 PRIMARY INSOMNIA: ICD-10-CM

## 2025-05-14 DIAGNOSIS — N32.9 LESION OF BLADDER: ICD-10-CM

## 2025-05-14 PROCEDURE — 3074F SYST BP LT 130 MM HG: CPT | Performed by: PHYSICIAN ASSISTANT

## 2025-05-14 PROCEDURE — G2211 COMPLEX E/M VISIT ADD ON: HCPCS | Performed by: PHYSICIAN ASSISTANT

## 2025-05-14 PROCEDURE — 3078F DIAST BP <80 MM HG: CPT | Performed by: PHYSICIAN ASSISTANT

## 2025-05-14 PROCEDURE — 99213 OFFICE O/P EST LOW 20 MIN: CPT | Performed by: PHYSICIAN ASSISTANT

## 2025-05-14 PROCEDURE — 1126F AMNT PAIN NOTED NONE PRSNT: CPT | Performed by: PHYSICIAN ASSISTANT

## 2025-05-14 ASSESSMENT — PAIN SCALES - GENERAL: PAINLEVEL_OUTOF10: NO PAIN (0)

## 2025-05-14 NOTE — PROGRESS NOTES
Preoperative Evaluation  [facility]  [address]  [address]  [place] [address]  Phone: [phone]  Fax: [phone]  Primary Provider: Kaley Carolina MD  Pre-op Performing Provider: Mary Moyer PA-C  May 14, 2025             5/13/2025   Surgical Information   What procedure is being done? Cystoscopy for bladder with lesion removal   Facility or Hospital where procedure/surgery will be performed: [facility]   Who is doing the procedure / surgery? Dr. Micky Bautista   Date of surgery / procedure: May 21   Time of surgery / procedure: 1:30 pm/cytoscopy   Where do you plan to recover after surgery? at home with family     Fax number for surgical facility: Note does not need to be faxed, will be available electronically in Epic.    Assessment & Plan     The proposed surgical procedure is considered INTERMEDIATE risk.    Preop general physical exam  Labs recently completed and stable.   No EKG or labs required for pre-op    Lesion of bladder  Gross hematuria  Reason for planned procedure.   Pt to follow closely with urology as advised.   May consider repeat CBC and UA in 1-2 months to ensure stability based on recommendations [facility] urology.    Primary insomnia  Stable. Taking magnesium. Amitriptyline on occasion     - No identified additional risk factors other than previously addressed    Preoperative Medication Instructions  Antiplatelet or Anticoagulation Medication Instructions   - We reviewed the medication list and the patient is not on an antiplatelet or anticoagulation medications.    Additional Medication Instructions   - SSRIs, SNRIs, TCAs, Antipsychotics: Continue without modification.     Recommendation  Approval given to proceed with proposed procedure, without further diagnostic evaluation.    The longitudinal plan of care for the diagnosis(es)/condition(s) as documented were addressed during this visit. Due to the added complexity in care, I will  continue to support Breonna in the subsequent management and with ongoing continuity of care.    FUTURE APPOINTMENTS:  -Follow-up as needed      Subjective   Breonna is a 54 year old, presenting for the following:  Pre-op.        5/14/2025    11:09 AM   Additional Questions   Roomed by Erlinda BUTLER CMA   Accompanied by self         5/14/2025    11:09 AM   Patient Reported Additional Medications   Patient reports taking the following new medications n/a     HPI: Patient is a 54 y.o. female who presents to the clinic for pre-op evaluation for excisional biopsy of bladder lesion. Pt had a couple episodes of gross hematuria 2 week ago. No additional episodes of gross hematuria. CT urogram was normal.           5/13/2025   Pre-Op Questionnaire   Have you ever had a heart attack or stroke? No   Have you ever had surgery on your heart or blood vessels, such as a stent placement, a coronary artery bypass, or surgery on an artery in your head, neck, heart, or legs? No   Do you have chest pain with activity? No   Do you have a history of heart failure? No   Do you currently have a cold, bronchitis or symptoms of other infection? No   Do you have a cough, shortness of breath, or wheezing? No   Do you or anyone in your family have previous history of blood clots? No   Do you or does anyone in your family have a serious bleeding problem such as prolonged bleeding following surgeries or cuts? No   Have you ever had problems with anemia or been told to take iron pills? (!) YES -pregnancy   Have you had any abnormal blood loss such as black, tarry or bloody stools, or abnormal vaginal bleeding? No   Have you ever had a blood transfusion? No   Are you willing to have a blood transfusion if it is medically needed before, during, or after your surgery? Yes   Have you or any of your relatives ever had problems with anesthesia? No   Do you have sleep apnea, excessive snoring or daytime drowsiness? No   Do you have any artifical heart  valves or other implanted medical devices like a pacemaker, defibrillator, or continuous glucose monitor? No   Do you have artificial joints? No   Are you allergic to latex? No     Advance Care Planning    Discussed advance care planning with patient; informed AVS has link to [employer].  Has one and will bring to hospital.    Preoperative Review of    reviewed - no record of controlled substances prescribed.          Patient Active Problem List    Diagnosis Date Noted    Hx of melanoma in situ 05/26/2023     Priority: Medium    Overactive bladder 11/07/2022     Priority: Medium    Primary insomnia 10/05/2022     Priority: Medium    Perioral dermatitis 11/11/2015     Priority: Medium    H/O dysplastic nevus 04/15/2014     Priority: Medium    Lactose intolerance 05/11/2011     Priority: Medium    Lichen sclerosus et atrophicus 06/09/2010     Priority: Medium    Atypical glandular cells on cervical Pap smear 04/27/2005     Priority: Medium     History of + HPV  4/12/05 [facility] - T[facility]IRA 1  2005 LEEP - TAHIRA 1  8/16/05 - 2012 NIL paps  2013 NIL Pap, Neg HPV  2014 NIL Pap, Neg HPV  2019 NIL Pap, Neg HPV  5/26/23 AGC (NOS), neg HPV. Plan colp with ECC & EMB due by 8/26/2023.   7/21/23 [facility] ECC & EMB - benign. Plan cotest in 1 year.   3/15/24 NIL Pap, Neg HPV. Plan cotest in 1 year.   3/18/25 NIL Pap, Neg HPV. Plan cotest in 3 years. (Pt prefers annual screening)        Allergic rhinitis 12/12/2003     Priority: Medium     Problem list name updated by automated process. Provider to review        Past Medical History:   Diagnosis Date    Abnormal Pap smear     Allergic rhinitis, cause unspecified     Anemia     Fertility problem     Malignant melanoma (H)     Other acne      Past Surgical History:   Procedure Laterality Date    BIOPSY OF SKIN LESION  04/2014    COLONOSCOPY N/A 06/02/2022    Procedure: COLONOSCOPY (fv);  Surgeon: Boris Carlos MD;  Location:  GI    COLPOSCOPY,LOOP ELECTRD CERVIX EXCIS  05/2005     "CYSTOSCOPY      D & C  2010    suction D&C for missed     HC REMOVAL GALLBLADDER  2003    HC REMOVE TONSILS/ADENOIDS,<11 Y/O      LAPAROSCOPY PROCEDURE UNLISTED  2010    removal of R ampullary ectopic pregnancy    LASIK       Current Outpatient Medications   Medication Sig Dispense Refill    amitriptyline (ELAVIL) 10 MG tablet Take 1-2 tablets (10-20 mg) by mouth nightly as needed for sleep 180 tablet 4    cetirizine (ZYRTEC) 10 MG tablet Take 1 tablet (10 mg) by mouth every evening 30 tablet 1    clobetasol (TEMOVATE) 0.05 % external ointment To genital area twice weekly 30 g 4    drospirenone-ethinyl estradiol (GREGORIO) 3-0.03 MG tablet Take 1 tablet by mouth daily. Take active pills daily 112 tablet 4    hydroquinone (SARAI) 4 % external cream Apply to face nightly for up to 16 weeks. 28 g 1    lactase (LACTAID) 3000 UNIT tablet Take 3,000 Units by mouth 3 times daily (with meals)      magnesium glycinate 100 MG CAPS capsule Take 100 mg by mouth daily.      tacrolimus (PROTOPIC) 0.1 % external ointment To genital area daily 30 g 6       Allergies   Allergen Reactions    Gluten      intolerance in foods    Lactose Intolerance (Gi)      intolerance, dairy foods    Seasonal Allergies     Cortisone Dermatitis      When pt takes cortisone, causes perioral dermatitis, per pt.        Social History     Tobacco Use    Smoking status: Never    Smokeless tobacco: Never   Substance Use Topics    Alcohol use: Not Currently     Comment: rare       History   Drug Use No         Review of Systems  Constitutional, HEENT, cardiovascular, pulmonary, gi and gu systems are negative, except as otherwise noted.    Objective    /68 (BP Location: Right arm, Patient Position: Sitting)   Pulse 69   Temp 97  F (36.1  C) (Temporal)   Resp 16   Ht 1.651 m (5' 5\")   Wt 64.9 kg (143 lb)   SpO2 98%   BMI 23.80 kg/m     Estimated body mass index is 23.8 kg/m  as calculated from the following:    Height as of " "this encounter: 1.651 m (5' 5\").    Weight as of this encounter: 64.9 kg (143 lb).    Physical Exam  GENERAL: alert and no distress  EYES: Eyes grossly normal to inspection, PERRL and conjunctivae and sclerae normal  HENT: ear canals and TM's normal, nose and mouth without ulcers or lesions  NECK: no adenopathy, no asymmetry, masses, or scars  RESP: lungs clear to auscultation - no rales, rhonchi or wheezes  CV: regular rate and rhythm, normal S1 S2, no S3 or S4, no murmur, click or rub, no peripheral edema  ABDOMEN: soft, nontender, no hepatosplenomegaly, no masses and bowel sounds normal  MS: no gross musculoskeletal defects noted, no edema  SKIN: no suspicious lesions or rashes  NEURO: Normal strength and tone, mentation intact and speech normal  PSYCH: mentation appears normal, affect normal/bright    Recent Labs   Lab Test 05/06/25  1157   HGB 12.6         POTASSIUM 3.9   CR 0.70      Diagnostics  No labs were ordered during this visit.   No EKG required, no history of coronary heart disease, significant arrhythmia, peripheral arterial disease or other structural heart disease.    Revised Cardiac Risk Index (RCRI)  The patient has the following serious cardiovascular risks for perioperative complications:   - No serious cardiac risks = 0 points     RCRI Interpretation: 0 points: Class I (very low risk - 0.4% complication rate)     Signed Electronically by: Mary Moyer PA-C  A copy of this evaluation report is provided to the requesting physician.         "

## 2025-05-14 NOTE — PATIENT INSTRUCTIONS

## 2025-05-21 ENCOUNTER — HOSPITAL ENCOUNTER (OUTPATIENT)
Facility: CLINIC | Age: 55
Discharge: HOME | End: 2025-05-21
Attending: UROLOGY | Admitting: UROLOGY
Payer: COMMERCIAL

## 2025-05-21 ENCOUNTER — ANESTHESIA EVENT (OUTPATIENT)
Dept: SURGERY | Facility: CLINIC | Age: 55
End: 2025-05-21
Payer: COMMERCIAL

## 2025-05-21 ENCOUNTER — ANESTHESIA (OUTPATIENT)
Dept: SURGERY | Facility: CLINIC | Age: 55
End: 2025-05-21
Payer: COMMERCIAL

## 2025-05-21 VITALS
HEIGHT: 65 IN | SYSTOLIC BLOOD PRESSURE: 107 MMHG | TEMPERATURE: 97.1 F | WEIGHT: 148.2 LBS | BODY MASS INDEX: 24.69 KG/M2 | HEART RATE: 58 BPM | DIASTOLIC BLOOD PRESSURE: 66 MMHG | OXYGEN SATURATION: 98 % | RESPIRATION RATE: 16 BRPM

## 2025-05-21 PROCEDURE — 250N000009 HC RX 250: Performed by: NURSE ANESTHETIST, CERTIFIED REGISTERED

## 2025-05-21 PROCEDURE — 710N000012 HC RECOVERY PHASE 2, PER MINUTE: Performed by: UROLOGY

## 2025-05-21 PROCEDURE — 272N000001 HC OR GENERAL SUPPLY STERILE: Performed by: UROLOGY

## 2025-05-21 PROCEDURE — 250N000011 HC RX IP 250 OP 636: Performed by: UROLOGY

## 2025-05-21 PROCEDURE — 360N000075 HC SURGERY LEVEL 2, PER MIN: Performed by: UROLOGY

## 2025-05-21 PROCEDURE — 710N000009 HC RECOVERY PHASE 1, LEVEL 1, PER MIN: Performed by: UROLOGY

## 2025-05-21 PROCEDURE — 258N000001 HC RX 258: Performed by: UROLOGY

## 2025-05-21 PROCEDURE — 88305 TISSUE EXAM BY PATHOLOGIST: CPT | Mod: TC | Performed by: UROLOGY

## 2025-05-21 PROCEDURE — 250N000011 HC RX IP 250 OP 636: Mod: JZ | Performed by: NURSE ANESTHETIST, CERTIFIED REGISTERED

## 2025-05-21 PROCEDURE — 250N000025 HC SEVOFLURANE, PER MIN: Performed by: UROLOGY

## 2025-05-21 PROCEDURE — 258N000003 HC RX IP 258 OP 636: Performed by: ANESTHESIOLOGY

## 2025-05-21 PROCEDURE — 999N000141 HC STATISTIC PRE-PROCEDURE NURSING ASSESSMENT: Performed by: UROLOGY

## 2025-05-21 PROCEDURE — 370N000017 HC ANESTHESIA TECHNICAL FEE, PER MIN: Performed by: UROLOGY

## 2025-05-21 RX ORDER — SODIUM CHLORIDE, SODIUM LACTATE, POTASSIUM CHLORIDE, CALCIUM CHLORIDE 600; 310; 30; 20 MG/100ML; MG/100ML; MG/100ML; MG/100ML
INJECTION, SOLUTION INTRAVENOUS CONTINUOUS
Status: DISCONTINUED | OUTPATIENT
Start: 2025-05-21 | End: 2025-05-21 | Stop reason: HOSPADM

## 2025-05-21 RX ORDER — HYDRALAZINE HYDROCHLORIDE 20 MG/ML
2.5-5 INJECTION INTRAMUSCULAR; INTRAVENOUS EVERY 10 MIN PRN
Status: DISCONTINUED | OUTPATIENT
Start: 2025-05-21 | End: 2025-05-21 | Stop reason: HOSPADM

## 2025-05-21 RX ORDER — CEFAZOLIN SODIUM/WATER 2 G/20 ML
2 SYRINGE (ML) INTRAVENOUS SEE ADMIN INSTRUCTIONS
Status: DISCONTINUED | OUTPATIENT
Start: 2025-05-21 | End: 2025-05-21 | Stop reason: HOSPADM

## 2025-05-21 RX ORDER — NALOXONE HYDROCHLORIDE 0.4 MG/ML
0.1 INJECTION, SOLUTION INTRAMUSCULAR; INTRAVENOUS; SUBCUTANEOUS
Status: DISCONTINUED | OUTPATIENT
Start: 2025-05-21 | End: 2025-05-21 | Stop reason: HOSPADM

## 2025-05-21 RX ORDER — FENTANYL CITRATE 50 UG/ML
50 INJECTION, SOLUTION INTRAMUSCULAR; INTRAVENOUS EVERY 5 MIN PRN
Status: DISCONTINUED | OUTPATIENT
Start: 2025-05-21 | End: 2025-05-21 | Stop reason: HOSPADM

## 2025-05-21 RX ORDER — PROPOFOL 10 MG/ML
INJECTION, EMULSION INTRAVENOUS PRN
Status: DISCONTINUED | OUTPATIENT
Start: 2025-05-21 | End: 2025-05-21

## 2025-05-21 RX ORDER — FENTANYL CITRATE 50 UG/ML
INJECTION, SOLUTION INTRAMUSCULAR; INTRAVENOUS PRN
Status: DISCONTINUED | OUTPATIENT
Start: 2025-05-21 | End: 2025-05-21

## 2025-05-21 RX ORDER — HYDROMORPHONE HCL IN WATER/PF 6 MG/30 ML
0.2 PATIENT CONTROLLED ANALGESIA SYRINGE INTRAVENOUS EVERY 5 MIN PRN
Status: DISCONTINUED | OUTPATIENT
Start: 2025-05-21 | End: 2025-05-21 | Stop reason: HOSPADM

## 2025-05-21 RX ORDER — FENTANYL CITRATE 50 UG/ML
25 INJECTION, SOLUTION INTRAMUSCULAR; INTRAVENOUS EVERY 5 MIN PRN
Status: DISCONTINUED | OUTPATIENT
Start: 2025-05-21 | End: 2025-05-21 | Stop reason: HOSPADM

## 2025-05-21 RX ORDER — ONDANSETRON 2 MG/ML
4 INJECTION INTRAMUSCULAR; INTRAVENOUS EVERY 30 MIN PRN
Status: DISCONTINUED | OUTPATIENT
Start: 2025-05-21 | End: 2025-05-21 | Stop reason: HOSPADM

## 2025-05-21 RX ORDER — DEXAMETHASONE SODIUM PHOSPHATE 4 MG/ML
INJECTION, SOLUTION INTRA-ARTICULAR; INTRALESIONAL; INTRAMUSCULAR; INTRAVENOUS; SOFT TISSUE PRN
Status: DISCONTINUED | OUTPATIENT
Start: 2025-05-21 | End: 2025-05-21

## 2025-05-21 RX ORDER — ALBUTEROL SULFATE 0.83 MG/ML
2.5 SOLUTION RESPIRATORY (INHALATION) EVERY 4 HOURS PRN
Status: DISCONTINUED | OUTPATIENT
Start: 2025-05-21 | End: 2025-05-21 | Stop reason: HOSPADM

## 2025-05-21 RX ORDER — ACETAMINOPHEN 325 MG/1
975 TABLET ORAL ONCE
Status: DISCONTINUED | OUTPATIENT
Start: 2025-05-21 | End: 2025-05-21 | Stop reason: HOSPADM

## 2025-05-21 RX ORDER — LABETALOL HYDROCHLORIDE 5 MG/ML
10 INJECTION, SOLUTION INTRAVENOUS
Status: DISCONTINUED | OUTPATIENT
Start: 2025-05-21 | End: 2025-05-21 | Stop reason: HOSPADM

## 2025-05-21 RX ORDER — MEPERIDINE HYDROCHLORIDE 25 MG/ML
12.5 INJECTION INTRAMUSCULAR; INTRAVENOUS; SUBCUTANEOUS EVERY 5 MIN PRN
Status: DISCONTINUED | OUTPATIENT
Start: 2025-05-21 | End: 2025-05-21 | Stop reason: HOSPADM

## 2025-05-21 RX ORDER — ACETAMINOPHEN 650 MG/1
650 SUPPOSITORY RECTAL ONCE
Status: DISCONTINUED | OUTPATIENT
Start: 2025-05-21 | End: 2025-05-21 | Stop reason: HOSPADM

## 2025-05-21 RX ORDER — DEXAMETHASONE SODIUM PHOSPHATE 4 MG/ML
4 INJECTION, SOLUTION INTRA-ARTICULAR; INTRALESIONAL; INTRAMUSCULAR; INTRAVENOUS; SOFT TISSUE
Status: DISCONTINUED | OUTPATIENT
Start: 2025-05-21 | End: 2025-05-21 | Stop reason: HOSPADM

## 2025-05-21 RX ORDER — CEFAZOLIN SODIUM/WATER 2 G/20 ML
2 SYRINGE (ML) INTRAVENOUS
Status: COMPLETED | OUTPATIENT
Start: 2025-05-21 | End: 2025-05-21

## 2025-05-21 RX ORDER — ONDANSETRON 2 MG/ML
INJECTION INTRAMUSCULAR; INTRAVENOUS PRN
Status: DISCONTINUED | OUTPATIENT
Start: 2025-05-21 | End: 2025-05-21

## 2025-05-21 RX ORDER — ONDANSETRON 4 MG/1
4 TABLET, ORALLY DISINTEGRATING ORAL EVERY 30 MIN PRN
Status: DISCONTINUED | OUTPATIENT
Start: 2025-05-21 | End: 2025-05-21 | Stop reason: HOSPADM

## 2025-05-21 RX ORDER — HYDROMORPHONE HCL IN WATER/PF 6 MG/30 ML
0.4 PATIENT CONTROLLED ANALGESIA SYRINGE INTRAVENOUS EVERY 5 MIN PRN
Status: DISCONTINUED | OUTPATIENT
Start: 2025-05-21 | End: 2025-05-21 | Stop reason: HOSPADM

## 2025-05-21 RX ORDER — OXYCODONE HYDROCHLORIDE 5 MG/1
5 TABLET ORAL
Status: DISCONTINUED | OUTPATIENT
Start: 2025-05-21 | End: 2025-05-21 | Stop reason: HOSPADM

## 2025-05-21 RX ORDER — OXYCODONE HYDROCHLORIDE 5 MG/1
10 TABLET ORAL
Status: DISCONTINUED | OUTPATIENT
Start: 2025-05-21 | End: 2025-05-21 | Stop reason: HOSPADM

## 2025-05-21 RX ORDER — LIDOCAINE HYDROCHLORIDE 20 MG/ML
INJECTION, SOLUTION INFILTRATION; PERINEURAL PRN
Status: DISCONTINUED | OUTPATIENT
Start: 2025-05-21 | End: 2025-05-21

## 2025-05-21 RX ADMIN — ONDANSETRON 4 MG: 2 INJECTION INTRAMUSCULAR; INTRAVENOUS at 15:05

## 2025-05-21 RX ADMIN — PROPOFOL 200 MG: 10 INJECTION, EMULSION INTRAVENOUS at 14:51

## 2025-05-21 RX ADMIN — LIDOCAINE HYDROCHLORIDE 50 MG: 20 INJECTION, SOLUTION INFILTRATION; PERINEURAL at 14:51

## 2025-05-21 RX ADMIN — Medication 2 G: at 14:40

## 2025-05-21 RX ADMIN — DEXAMETHASONE SODIUM PHOSPHATE 8 MG: 4 INJECTION, SOLUTION INTRA-ARTICULAR; INTRALESIONAL; INTRAMUSCULAR; INTRAVENOUS; SOFT TISSUE at 15:05

## 2025-05-21 RX ADMIN — FENTANYL CITRATE 100 MCG: 50 INJECTION INTRAMUSCULAR; INTRAVENOUS at 14:51

## 2025-05-21 RX ADMIN — SODIUM CHLORIDE, SODIUM LACTATE, POTASSIUM CHLORIDE, AND CALCIUM CHLORIDE: .6; .31; .03; .02 INJECTION, SOLUTION INTRAVENOUS at 14:48

## 2025-05-21 ASSESSMENT — ACTIVITIES OF DAILY LIVING (ADL)
ADLS_ACUITY_SCORE: 38

## 2025-05-21 NOTE — ANESTHESIA PREPROCEDURE EVALUATION
Anesthesia Pre-Procedure Evaluation    Patient: Breonna Pinon   MRN: [id number] : 1970          Procedure : Procedure(s):  Cystoscopy with bladder biopsy         Past Medical History:   Diagnosis Date    Abnormal Pap smear     Allergic rhinitis, cause unspecified     Anemia     Fertility problem     Malignant melanoma (H)     Other acne       Past Surgical History:   Procedure Laterality Date    BIOPSY OF SKIN LESION  2014    COLONOSCOPY N/A 2022    Procedure: COLONOSCOPY (fv);  Surgeon: Boris Carlos MD;  Location:  GI    COLPOSCOPY,LOOP ELECTRD CERVIX EXCIS  2005    CYSTOSCOPY      D & C  2010    suction D&C for missed     HC REMOVAL GALLBLADDER  2003    HC REMOVE TONSILS/ADENOIDS,<11 Y/O      LAPAROSCOPY PROCEDURE UNLISTED  2010    removal of R ampullary ectopic pregnancy    LASIK        Allergies   Allergen Reactions    Gluten      intolerance in foods    Lactose Intolerance (Gi)      intolerance, dairy foods    Seasonal Allergies     Cortisone Dermatitis      When pt takes cortisone, causes perioral dermatitis, per pt.      Social History     Tobacco Use    Smoking status: Never    Smokeless tobacco: Never   Substance Use Topics    Alcohol use: Not Currently     Comment: rare      Wt Readings from Last 1 Encounters:   25 67.2 kg (148 lb 3.2 oz)        Anesthesia Evaluation   Pt has had prior anesthetic.         ROS/MED HX  ENT/Pulmonary:       Neurologic:       Cardiovascular:       METS/Exercise Tolerance:     Hematologic:       Musculoskeletal:       GI/Hepatic:       Renal/Genitourinary:       Endo:       Psychiatric/Substance Use:       Infectious Disease:       Malignancy:       Other:              Physical Exam  Airway  Mallampati: II  TM distance: >3 FB  Neck ROM: full  Mouth opening: >= 4 cm    Cardiovascular   Rhythm: regular     Dental   (+) Minor Abnormalities - some fillings, tiny chips      Pulmonary Breath sounds clear to auscultation         Neurological   Other Findings       OUTSIDE LABS:  CBC:   Lab Results   Component Value Date    WBC 8.5 05/06/2025    WBC 6.2 04/25/2017    HGB 12.6 05/06/2025    HGB 12.3 04/25/2017    HCT 39.1 05/06/2025    HCT 37.5 04/25/2017     05/06/2025     04/25/2017     BMP:   Lab Results   Component Value Date     05/06/2025     05/26/2023    POTASSIUM 3.9 05/06/2025    POTASSIUM 3.9 05/26/2023    CHLORIDE 106 05/06/2025    CHLORIDE 106 05/26/2023    CO2 21 (L) 05/06/2025    CO2 23 05/26/2023    BUN 9.8 05/06/2025    BUN 10.6 05/26/2023    CR 0.70 05/06/2025    CR 0.66 05/26/2023    GLC 89 05/06/2025    GLC 81 05/26/2023     COAGS:   Lab Results   Component Value Date    PTT 32 06/18/2010    INR 1.08 06/18/2010     POC:   Lab Results   Component Value Date    HCG Negative 07/21/2023    HCGS (A) 06/08/2010     Positive   This test provides a presumptive diagnosis of pregnancy or non-pregnancy. A   confirmed pregnancy diagnosis should only be made by a physician after all   clinical and laboratory findings have been evaluated.     HEPATIC:   Lab Results   Component Value Date    ALBUMIN 4.1 05/26/2023    PROTTOTAL 6.5 05/26/2023    ALT 11 05/26/2023    AST 21 05/26/2023    ALKPHOS 60 05/26/2023    BILITOTAL 0.3 05/26/2023     OTHER:   Lab Results   Component Value Date    A1C 5.0 05/26/2023    LAYA 9.1 05/06/2025    LIPASE 201 09/14/2013    TSH 1.19 06/05/2023       Anesthesia Plan    ASA Status:  1      NPO Status: NPO Appropriate   Anesthesia Type: General.  Airway: supraglottic airway.  Induction: intravenous.  Maintenance: Balanced.   Techniques and Equipment:     - Airway:  Planned airway equipment includes supraglottic airway.     - Monitoring Plan: standard ASA monitoring     Consents    Anesthesia Plan(s) and associated risks, benefits, and realistic alternatives discussed. Questions answered and patient/representative(s) expressed understanding.     - Discussed:     - Discussed with:   Patient               Postoperative Care    Pain management: multimodal analgesia.     Comments:                   Lalo Singletary MD    I have reviewed the pertinent notes and labs in the chart from the past 30 days and (re)examined the patient.  Any updates or changes from those notes are reflected in this note.    Clinically Significant Risk Factors Present on Admission

## 2025-05-21 NOTE — DISCHARGE INSTRUCTIONS
Maximum acetaminophen (Tylenol) dose from all sources should not exceed 4 grams (4000 mg) per day.  You received in 975 mg of tylenol at 12:30 pm, do not take anymore tylenol until 6:30 pm if needed.    DR. NAMAN OLIVERA   [facility] PHONE NUMBER:  [phone]  [facility]

## 2025-05-21 NOTE — ANESTHESIA CARE TRANSFER NOTE
Patient: Breonna Pinon    Procedure: Procedure(s):  Cystoscopy with bladder biopsy       Diagnosis: Lesion of bladder [N32.9]  Diagnosis Additional Information: No value filed.    Anesthesia Type:   General     Note:    Oropharynx: oropharynx clear of all foreign objects and spontaneously breathing  Level of Consciousness: drowsy  Oxygen Supplementation: face mask  Level of Supplemental Oxygen (L/min / FiO2): 10  Independent Airway: airway patency satisfactory and stable  Dentition: dentition unchanged  Vital Signs Stable: post-procedure vital signs reviewed and stable  Report to RN Given: handoff report given  Patient transferred to: PACU    Handoff Report: Identifed the Patient, Identified the Reponsible Provider, Reviewed the pertinent medical history, Discussed the surgical course, Reviewed Intra-OP anesthesia mangement and issues during anesthesia, Set expectations for post-procedure period and Allowed opportunity for questions and acknowledgement of understanding      Vitals:  Vitals Value Taken Time   BP     Temp     Pulse     Resp     SpO2         Electronically Signed By: BLACK Whittington CRNA  May 21, 2025  3:18 PM

## 2025-05-21 NOTE — OP NOTE
SURGEON:  Micky Bautista MD     PREOPERATIVE DIAGNOSIS: Bladder tumor, small     POSTOPERATIVE DIAGNOSIS: Bladder tumor, small     PROCEDURE PERFORMED: Cystoscopy, bladder biopsy with fulguration of the bladder     ANESTHESIA:  General.     COMPLICATIONS:  None     INDICATIONS FOR PROCEDURE: This is a 54-year-old woman with a small papillary lesion discovered on cystoscopy who now presents for excisional bladder biopsy    DETAILS OF PROCEDURE: The patient was brought to the operating room and placed supine on the operating table and underwent general endotracheal anesthetic.  The patient was then moved down to the dorsolithotomy position and prepped and draped in standard sterile fashion.  I introduced the rigid cystoscope through the urethra into the bladder and I performed cystoscopy.  There was a very tiny papillary lesion located above the right orifice, likely consistent with a P UN LMP.  I used the cold cup biopsy forcep to biopsy any areas of abnormality and remove all tumor and also biopsy the base of the tumor.  I used the Bugbee electrode to fulgurate the bladder.  When there was good hemostasis and all visible tumor had been removed, I removed the cystoscope.     The patient tolerated the procedure well without complications and went to the postanesthetic care unit in good condition.  I will contact the patient with pathology results.

## 2025-05-21 NOTE — ANESTHESIA PROCEDURE NOTES
Airway       Patient location during procedure: OR       Procedure Start/Stop Times: 5/21/2025 2:54 PM  Staff -        CRNA: Milo Nuno APRN CRNA       Performed By: CRNAIndications and Patient Condition       Indications for airway management: henry-procedural and airway protection       Induction type:intravenous       Mask difficulty assessment: 1 - vent by mask    Final Airway Details       Final airway type: supraglottic airway    Supraglottic Airway Details        Type: LMA       Brand: I-Gel       LMA size: 4    Post intubation assessment        Placement verified by: capnometry, equal breath sounds and chest rise        Number of attempts at approach: 1       Secured with: commercial tube cavanaugh       Ease of procedure: easy       Dentition: Intact    Medication(s) Administered   Medication Administration Time: 5/21/2025 2:54 PM

## 2025-05-21 NOTE — ANESTHESIA POSTPROCEDURE EVALUATION
Patient: Breonna Pinon    Procedure: Procedure(s):  Cystoscopy with bladder biopsy       Anesthesia Type:  General    Note:  Disposition: Outpatient   Postop Pain Control: Uneventful            Sign Out: Well controlled pain   PONV: No   Neuro/Psych: Uneventful            Sign Out: Acceptable/Baseline neuro status   Airway/Respiratory: Uneventful            Sign Out: Acceptable/Baseline resp. status   CV/Hemodynamics: Uneventful            Sign Out: Acceptable CV status; No obvious hypovolemia; No obvious fluid overload   Other NRE:    DID A NON-ROUTINE EVENT OCCUR? No           Last vitals:  Vitals Value Taken Time   BP 96/56 05/21/25 15:45   Temp 97.7  F (36.5  C) 05/21/25 15:43   Pulse 67 05/21/25 15:46   Resp 25 05/21/25 15:46   SpO2 100 % 05/21/25 15:46   Vitals shown include unfiled device data.    Electronically Signed By: Sharda Martell MD  May 21, 2025  6:00 PM

## 2025-05-23 PROCEDURE — 88305 TISSUE EXAM BY PATHOLOGIST: CPT | Mod: 26 | Performed by: PATHOLOGY

## 2025-05-28 ENCOUNTER — TRANSFERRED RECORDS (OUTPATIENT)
Dept: HEALTH INFORMATION MANAGEMENT | Facility: CLINIC | Age: 55
End: 2025-05-28
Payer: COMMERCIAL

## 2025-05-29 DIAGNOSIS — R39.9 UTI SYMPTOMS: Primary | ICD-10-CM

## 2025-06-03 ENCOUNTER — TELEPHONE (OUTPATIENT)
Dept: UROLOGY | Facility: CLINIC | Age: 55
End: 2025-06-03
Payer: COMMERCIAL

## 2025-06-03 NOTE — TELEPHONE ENCOUNTER
Patient called nurse line and LM. She wanted to know if she had a UTI and what culture results showed. Explained to patient that it showed some mixed brett and that meant there was a tiny bit of contamination. Explained that no infection was present. Patient explained that it must've been funky healing after her procedure and didn't require any more need for explanation.     Emily Valles, DEVYN

## 2025-06-04 ENCOUNTER — DOCUMENTATION ONLY (OUTPATIENT)
Dept: OTHER | Facility: CLINIC | Age: 55
End: 2025-06-04
Payer: COMMERCIAL

## 2025-06-20 ENCOUNTER — RESULTS FOLLOW-UP (OUTPATIENT)
Dept: PEDIATRICS | Facility: CLINIC | Age: 55
End: 2025-06-20

## 2025-07-23 ENCOUNTER — RESULTS FOLLOW-UP (OUTPATIENT)
Dept: PEDIATRICS | Facility: CLINIC | Age: 55
End: 2025-07-23

## 2025-07-23 ENCOUNTER — LAB (OUTPATIENT)
Dept: LAB | Facility: CLINIC | Age: 55
End: 2025-07-23
Payer: COMMERCIAL

## 2025-07-23 DIAGNOSIS — R31.0 GROSS HEMATURIA: ICD-10-CM

## 2025-07-23 LAB
ALBUMIN UR-MCNC: NEGATIVE MG/DL
APPEARANCE UR: CLEAR
BILIRUB UR QL STRIP: NEGATIVE
COLOR UR AUTO: YELLOW
GLUCOSE UR STRIP-MCNC: NEGATIVE MG/DL
HGB UR QL STRIP: ABNORMAL
KETONES UR STRIP-MCNC: NEGATIVE MG/DL
LEUKOCYTE ESTERASE UR QL STRIP: NEGATIVE
NITRATE UR QL: NEGATIVE
PH UR STRIP: 7 [PH] (ref 5–7)
RBC #/AREA URNS AUTO: ABNORMAL /HPF
SP GR UR STRIP: 1.01 (ref 1–1.03)
UROBILINOGEN UR STRIP-ACNC: 0.2 E.U./DL
WBC #/AREA URNS AUTO: ABNORMAL /HPF

## 2025-07-23 PROCEDURE — 81001 URINALYSIS AUTO W/SCOPE: CPT

## 2025-07-24 RX ORDER — MIRABEGRON 25 MG/1
25 TABLET, FILM COATED, EXTENDED RELEASE ORAL DAILY
Qty: 90 TABLET | Refills: 0 | Status: CANCELLED | OUTPATIENT
Start: 2025-07-24

## 2025-07-24 RX ORDER — TROSPIUM CHLORIDE 20 MG/1
20 TABLET, FILM COATED ORAL
COMMUNITY

## 2025-07-24 RX ORDER — TROSPIUM CHLORIDE 20 MG/1
20 TABLET, FILM COATED ORAL
Qty: 180 TABLET | Refills: 0 | Status: CANCELLED | OUTPATIENT
Start: 2025-07-24

## 2025-08-07 ENCOUNTER — OFFICE VISIT (OUTPATIENT)
Dept: OBGYN | Facility: CLINIC | Age: 55
End: 2025-08-07
Payer: COMMERCIAL

## 2025-08-07 VITALS — BODY MASS INDEX: 24.13 KG/M2 | WEIGHT: 145 LBS | DIASTOLIC BLOOD PRESSURE: 68 MMHG | SYSTOLIC BLOOD PRESSURE: 110 MMHG

## 2025-08-07 DIAGNOSIS — N95.2 ATROPHIC VAGINITIS: Primary | ICD-10-CM

## 2025-08-07 DIAGNOSIS — N90.4 LICHEN SCLEROSUS ET ATROPHICUS OF THE VULVA: ICD-10-CM

## 2025-08-07 RX ORDER — ESTRADIOL 0.1 MG/G
CREAM VAGINAL
Qty: 42.5 G | Refills: 6 | Status: SHIPPED | OUTPATIENT
Start: 2025-08-07

## 2025-08-29 ENCOUNTER — OFFICE VISIT (OUTPATIENT)
Dept: UROLOGY | Facility: CLINIC | Age: 55
End: 2025-08-29
Payer: COMMERCIAL

## 2025-08-29 VITALS
WEIGHT: 145 LBS | DIASTOLIC BLOOD PRESSURE: 78 MMHG | BODY MASS INDEX: 23.3 KG/M2 | HEIGHT: 66 IN | HEART RATE: 77 BPM | OXYGEN SATURATION: 100 % | SYSTOLIC BLOOD PRESSURE: 113 MMHG

## 2025-08-29 DIAGNOSIS — R31.0 GROSS HEMATURIA: ICD-10-CM

## 2025-08-29 DIAGNOSIS — N39.3 SUI (STRESS URINARY INCONTINENCE, FEMALE): ICD-10-CM

## 2025-08-29 DIAGNOSIS — R35.0 URINARY FREQUENCY: ICD-10-CM

## 2025-08-29 DIAGNOSIS — R31.29 MICROSCOPIC HEMATURIA: Primary | ICD-10-CM

## 2025-08-29 LAB
ALBUMIN UR-MCNC: NEGATIVE MG/DL
APPEARANCE UR: CLEAR
BILIRUB UR QL STRIP: NEGATIVE
COLOR UR AUTO: YELLOW
GLUCOSE UR STRIP-MCNC: NEGATIVE MG/DL
HGB UR QL STRIP: ABNORMAL
KETONES UR STRIP-MCNC: NEGATIVE MG/DL
LEUKOCYTE ESTERASE UR QL STRIP: NEGATIVE
MUCOUS THREADS #/AREA URNS LPF: PRESENT /LPF
NITRATE UR QL: NEGATIVE
PH UR STRIP: 6 [PH] (ref 5–7)
RBC URINE: 3 /HPF
SP GR UR STRIP: 1.01 (ref 1–1.03)
UROBILINOGEN UR STRIP-ACNC: 0.2 E.U./DL
WBC URINE: <1 /HPF

## 2025-08-29 PROCEDURE — 88112 CYTOPATH CELL ENHANCE TECH: CPT | Performed by: PATHOLOGY

## 2025-08-29 PROCEDURE — 81001 URINALYSIS AUTO W/SCOPE: CPT

## 2025-08-29 PROCEDURE — 1126F AMNT PAIN NOTED NONE PRSNT: CPT

## 2025-08-29 PROCEDURE — 3074F SYST BP LT 130 MM HG: CPT

## 2025-08-29 PROCEDURE — 99214 OFFICE O/P EST MOD 30 MIN: CPT

## 2025-08-29 PROCEDURE — 3078F DIAST BP <80 MM HG: CPT

## 2025-08-29 ASSESSMENT — PAIN SCALES - GENERAL: PAINLEVEL_OUTOF10: NO PAIN (0)

## 2025-09-04 ENCOUNTER — ANCILLARY PROCEDURE (OUTPATIENT)
Dept: GENERAL RADIOLOGY | Facility: CLINIC | Age: 55
End: 2025-09-04
Attending: PHYSICIAN ASSISTANT
Payer: COMMERCIAL

## 2025-09-04 ENCOUNTER — OFFICE VISIT (OUTPATIENT)
Dept: URGENT CARE | Facility: URGENT CARE | Age: 55
End: 2025-09-04
Payer: COMMERCIAL

## 2025-09-04 VITALS
HEIGHT: 66 IN | BODY MASS INDEX: 24 KG/M2 | TEMPERATURE: 97.6 F | SYSTOLIC BLOOD PRESSURE: 119 MMHG | HEART RATE: 81 BPM | OXYGEN SATURATION: 99 % | DIASTOLIC BLOOD PRESSURE: 67 MMHG | RESPIRATION RATE: 18 BRPM | WEIGHT: 149.36 LBS

## 2025-09-04 DIAGNOSIS — M54.2 NECK PAIN: ICD-10-CM

## 2025-09-04 DIAGNOSIS — M54.50 LUMBAR PAIN: Primary | ICD-10-CM

## 2025-09-04 RX ORDER — CYCLOBENZAPRINE HCL 10 MG
10 TABLET ORAL 2 TIMES DAILY PRN
Qty: 14 TABLET | Refills: 0 | Status: SHIPPED | OUTPATIENT
Start: 2025-09-04

## 2025-09-04 ASSESSMENT — PAIN SCALES - GENERAL: PAINLEVEL_OUTOF10: MODERATE PAIN (4)

## (undated) DEVICE — ESU GROUND PAD ADULT W/CORD E7507

## (undated) DEVICE — PREP DYNA-HEX 4% CHG SCRUB 4OZ BOTTLE MDS098710

## (undated) DEVICE — KIT ENDO TURNOVER/PROCEDURE W/CLEAN A SCOPE LINERS 103888

## (undated) DEVICE — BAG CLEAR TRASH 1.3M 39X33" P4040C

## (undated) DEVICE — PACK CYSTO CUSTOM RIDGES

## (undated) DEVICE — CONTRAST ISOVUE 300 61% IOPAMIDOL 10X30ML VIAL 131525

## (undated) DEVICE — TUBING IRR LG BORE TUBE DRIP CHMBR 2 BG 94IN 313003

## (undated) DEVICE — LINEN HALF SHEET 5512

## (undated) DEVICE — SOLUTION WATER 1000ML BOTTLE R5000-01

## (undated) DEVICE — COVER FOOTSWITCH W/CINCH 20X24" 923267

## (undated) DEVICE — LINEN FULL SHEET 5511

## (undated) DEVICE — SOLUTION IV WATER 3L HYPOTONIC R8006

## (undated) DEVICE — NDL 18GA 1.5" 305196

## (undated) DEVICE — GLOVE BIOGEL PI ULTRATOUCH SZ 7.5 41175

## (undated) DEVICE — DRSG TELFA 2X3"

## (undated) RX ORDER — FENTANYL CITRATE 0.05 MG/ML
INJECTION, SOLUTION INTRAMUSCULAR; INTRAVENOUS
Status: DISPENSED
Start: 2022-06-02

## (undated) RX ORDER — LIDOCAINE HYDROCHLORIDE 10 MG/ML
INJECTION, SOLUTION EPIDURAL; INFILTRATION; INTRACAUDAL; PERINEURAL
Status: DISPENSED
Start: 2025-05-21

## (undated) RX ORDER — PROPOFOL 10 MG/ML
INJECTION, EMULSION INTRAVENOUS
Status: DISPENSED
Start: 2025-05-21

## (undated) RX ORDER — ONDANSETRON 2 MG/ML
INJECTION INTRAMUSCULAR; INTRAVENOUS
Status: DISPENSED
Start: 2025-05-21

## (undated) RX ORDER — KETOROLAC TROMETHAMINE 30 MG/ML
INJECTION, SOLUTION INTRAMUSCULAR; INTRAVENOUS
Status: DISPENSED
Start: 2025-05-21

## (undated) RX ORDER — FENTANYL CITRATE 50 UG/ML
INJECTION, SOLUTION INTRAMUSCULAR; INTRAVENOUS
Status: DISPENSED
Start: 2025-05-21

## (undated) RX ORDER — ACETAMINOPHEN 325 MG/1
TABLET ORAL
Status: DISPENSED
Start: 2025-05-21

## (undated) RX ORDER — CEFAZOLIN SODIUM/WATER 2 G/20 ML
SYRINGE (ML) INTRAVENOUS
Status: DISPENSED
Start: 2025-05-21